# Patient Record
Sex: MALE
[De-identification: names, ages, dates, MRNs, and addresses within clinical notes are randomized per-mention and may not be internally consistent; named-entity substitution may affect disease eponyms.]

---

## 2018-08-16 ENCOUNTER — HOSPITAL ENCOUNTER (OUTPATIENT)
Dept: HOSPITAL 88 - RAD | Age: 63
End: 2018-08-16
Attending: FAMILY MEDICINE
Payer: MEDICARE

## 2018-08-16 DIAGNOSIS — L89.610: Primary | ICD-10-CM

## 2018-08-16 NOTE — DIAGNOSTIC IMAGING REPORT
Exam: Right foot series, 3 views. 



Clinical History: Pressure ulcer in right heel



Comparison: None.



Findings: 3 views of the right foot. There is normal bone mineralization.

Negative for acute, displaced fracture or dislocation. No cortical erosion or

destruction.

Degenerative changes in the hindfoot/midfoot joints.

Extensive vascular calcifications.

Soft tissue defect in the posterior aspect of the heel, likely representing the

known ulceration.

Moderate anterior calcaneal enthesophyte.



Impression:



1. Soft tissue defect in the posterior aspect of the heel likely represents the

known ulceration. Adjacent bony structures are intact. No cortical destruction

or erosion to suggest osteomyelitis.

2. Extensive vascular calcifications.





Signed by: Dr. Jeremiah Brito M.D. on 8/16/2018 2:15 PM

## 2018-08-23 ENCOUNTER — HOSPITAL ENCOUNTER (OUTPATIENT)
Dept: HOSPITAL 88 - WCC | Age: 63
LOS: 8 days | End: 2018-08-31
Attending: FAMILY MEDICINE
Payer: MEDICARE

## 2018-08-23 DIAGNOSIS — H54.8: ICD-10-CM

## 2018-08-23 DIAGNOSIS — I79.8: ICD-10-CM

## 2018-08-23 DIAGNOSIS — I10: ICD-10-CM

## 2018-08-23 DIAGNOSIS — E11.65: Primary | ICD-10-CM

## 2018-08-23 DIAGNOSIS — N18.6: ICD-10-CM

## 2018-08-23 DIAGNOSIS — E11.40: ICD-10-CM

## 2018-08-23 DIAGNOSIS — L89.610: ICD-10-CM

## 2018-08-23 DIAGNOSIS — L97.528: ICD-10-CM

## 2018-08-23 DIAGNOSIS — E78.00: ICD-10-CM

## 2018-08-23 DIAGNOSIS — E11.621: ICD-10-CM

## 2018-08-23 PROCEDURE — 36415 COLL VENOUS BLD VENIPUNCTURE: CPT

## 2018-08-23 PROCEDURE — 82948 REAGENT STRIP/BLOOD GLUCOSE: CPT

## 2019-01-03 ENCOUNTER — HOSPITAL ENCOUNTER (INPATIENT)
Dept: HOSPITAL 88 - ER | Age: 64
LOS: 10 days | Discharge: HOME | DRG: 255 | End: 2019-01-13
Attending: INTERNAL MEDICINE | Admitting: INTERNAL MEDICINE
Payer: MEDICARE

## 2019-01-03 VITALS — BODY MASS INDEX: 33.56 KG/M2 | HEIGHT: 70 IN | WEIGHT: 234.44 LBS

## 2019-01-03 VITALS — DIASTOLIC BLOOD PRESSURE: 74 MMHG | SYSTOLIC BLOOD PRESSURE: 174 MMHG

## 2019-01-03 VITALS — SYSTOLIC BLOOD PRESSURE: 149 MMHG | DIASTOLIC BLOOD PRESSURE: 65 MMHG

## 2019-01-03 DIAGNOSIS — E11.621: ICD-10-CM

## 2019-01-03 DIAGNOSIS — Z99.2: ICD-10-CM

## 2019-01-03 DIAGNOSIS — E11.52: Primary | ICD-10-CM

## 2019-01-03 DIAGNOSIS — L89.613: ICD-10-CM

## 2019-01-03 DIAGNOSIS — L97.524: ICD-10-CM

## 2019-01-03 DIAGNOSIS — N18.6: ICD-10-CM

## 2019-01-03 DIAGNOSIS — E66.01: ICD-10-CM

## 2019-01-03 DIAGNOSIS — R53.81: ICD-10-CM

## 2019-01-03 DIAGNOSIS — I50.9: ICD-10-CM

## 2019-01-03 DIAGNOSIS — Z79.82: ICD-10-CM

## 2019-01-03 DIAGNOSIS — E11.40: ICD-10-CM

## 2019-01-03 DIAGNOSIS — M86.172: ICD-10-CM

## 2019-01-03 DIAGNOSIS — Z95.5: ICD-10-CM

## 2019-01-03 DIAGNOSIS — L03.116: ICD-10-CM

## 2019-01-03 DIAGNOSIS — E11.22: ICD-10-CM

## 2019-01-03 DIAGNOSIS — E78.5: ICD-10-CM

## 2019-01-03 DIAGNOSIS — H35.30: ICD-10-CM

## 2019-01-03 DIAGNOSIS — Z91.19: ICD-10-CM

## 2019-01-03 DIAGNOSIS — I13.2: ICD-10-CM

## 2019-01-03 DIAGNOSIS — E11.319: ICD-10-CM

## 2019-01-03 DIAGNOSIS — E11.69: ICD-10-CM

## 2019-01-03 DIAGNOSIS — G89.29: ICD-10-CM

## 2019-01-03 DIAGNOSIS — I25.10: ICD-10-CM

## 2019-01-03 DIAGNOSIS — Z79.4: ICD-10-CM

## 2019-01-03 DIAGNOSIS — Z95.820: ICD-10-CM

## 2019-01-03 LAB
ALBUMIN SERPL-MCNC: 3.4 G/DL (ref 3.5–5)
ALBUMIN/GLOB SERPL: 0.8 {RATIO} (ref 0.8–2)
ALP SERPL-CCNC: 61 IU/L (ref 40–150)
ALT SERPL-CCNC: 12 IU/L (ref 0–55)
ANION GAP SERPL CALC-SCNC: 18.5 MMOL/L (ref 8–16)
BASOPHILS # BLD AUTO: 0.1 10*3/UL (ref 0–0.1)
BASOPHILS NFR BLD AUTO: 0.8 % (ref 0–1)
BUN SERPL-MCNC: 33 MG/DL (ref 7–26)
BUN/CREAT SERPL: 4 (ref 6–25)
CALCIUM SERPL-MCNC: 9.7 MG/DL (ref 8.4–10.2)
CHLORIDE SERPL-SCNC: 91 MMOL/L (ref 98–107)
CK MB SERPL-MCNC: 1.1 NG/ML (ref 0–5)
CK SERPL-CCNC: 85 IU/L (ref 30–200)
CO2 SERPL-SCNC: 29 MMOL/L (ref 22–29)
DEPRECATED APTT PLAS QN: 32.9 SECONDS (ref 23.8–35.5)
DEPRECATED INR PLAS: 0.94
DEPRECATED NEUTROPHILS # BLD AUTO: 6.2 10*3/UL (ref 2.1–6.9)
EGFRCR SERPLBLD CKD-EPI 2021: 8 ML/MIN (ref 60–?)
EOSINOPHIL # BLD AUTO: 0.4 10*3/UL (ref 0–0.4)
EOSINOPHIL NFR BLD AUTO: 4.4 % (ref 0–6)
ERYTHROCYTE [DISTWIDTH] IN CORD BLOOD: 14.8 % (ref 11.7–14.4)
GLOBULIN PLAS-MCNC: 4.4 G/DL (ref 2.3–3.5)
GLUCOSE SERPLBLD-MCNC: 142 MG/DL (ref 74–118)
HCT VFR BLD AUTO: 37.6 % (ref 38.2–49.6)
HGB BLD-MCNC: 11.9 G/DL (ref 14–18)
LIPASE SERPL-CCNC: 32 U/L (ref 8–78)
LYMPHOCYTES # BLD: 1.7 10*3/UL (ref 1–3.2)
LYMPHOCYTES NFR BLD AUTO: 18.8 % (ref 18–39.1)
MCH RBC QN AUTO: 30.7 PG (ref 28–32)
MCHC RBC AUTO-ENTMCNC: 31.6 G/DL (ref 31–35)
MCV RBC AUTO: 96.9 FL (ref 81–99)
MONOCYTES # BLD AUTO: 0.7 10*3/UL (ref 0.2–0.8)
MONOCYTES NFR BLD AUTO: 8.1 % (ref 4.4–11.3)
NEUTS SEG NFR BLD AUTO: 67.4 % (ref 38.7–80)
PLATELET # BLD AUTO: 347 X10E3/UL (ref 140–360)
POTASSIUM SERPL-SCNC: 4.5 MMOL/L (ref 3.5–5.1)
PROTHROMBIN TIME: 13.4 SECONDS (ref 11.9–14.5)
RBC # BLD AUTO: 3.88 X10E6/UL (ref 4.3–5.7)
SODIUM SERPL-SCNC: 134 MMOL/L (ref 136–145)

## 2019-01-03 PROCEDURE — 83690 ASSAY OF LIPASE: CPT

## 2019-01-03 PROCEDURE — 87493 C DIFF AMPLIFIED PROBE: CPT

## 2019-01-03 PROCEDURE — 85730 THROMBOPLASTIN TIME PARTIAL: CPT

## 2019-01-03 PROCEDURE — 87205 SMEAR GRAM STAIN: CPT

## 2019-01-03 PROCEDURE — 86705 HEP B CORE ANTIBODY IGM: CPT

## 2019-01-03 PROCEDURE — 82948 REAGENT STRIP/BLOOD GLUCOSE: CPT

## 2019-01-03 PROCEDURE — 87075 CULTR BACTERIA EXCEPT BLOOD: CPT

## 2019-01-03 PROCEDURE — 87186 SC STD MICRODIL/AGAR DIL: CPT

## 2019-01-03 PROCEDURE — 87340 HEPATITIS B SURFACE AG IA: CPT

## 2019-01-03 PROCEDURE — 88304 TISSUE EXAM BY PATHOLOGIST: CPT

## 2019-01-03 PROCEDURE — 84484 ASSAY OF TROPONIN QUANT: CPT

## 2019-01-03 PROCEDURE — 87040 BLOOD CULTURE FOR BACTERIA: CPT

## 2019-01-03 PROCEDURE — 36415 COLL VENOUS BLD VENIPUNCTURE: CPT

## 2019-01-03 PROCEDURE — 99284 EMERGENCY DEPT VISIT MOD MDM: CPT

## 2019-01-03 PROCEDURE — 80048 BASIC METABOLIC PNL TOTAL CA: CPT

## 2019-01-03 PROCEDURE — 86704 HEP B CORE ANTIBODY TOTAL: CPT

## 2019-01-03 PROCEDURE — 93005 ELECTROCARDIOGRAM TRACING: CPT

## 2019-01-03 PROCEDURE — 88305 TISSUE EXAM BY PATHOLOGIST: CPT

## 2019-01-03 PROCEDURE — 83605 ASSAY OF LACTIC ACID: CPT

## 2019-01-03 PROCEDURE — 80053 COMPREHEN METABOLIC PANEL: CPT

## 2019-01-03 PROCEDURE — 82550 ASSAY OF CK (CPK): CPT

## 2019-01-03 PROCEDURE — 88311 DECALCIFY TISSUE: CPT

## 2019-01-03 PROCEDURE — 90962 ESRD SERV 1 VISIT P MO 20+: CPT

## 2019-01-03 PROCEDURE — 85025 COMPLETE CBC W/AUTO DIFF WBC: CPT

## 2019-01-03 PROCEDURE — 85610 PROTHROMBIN TIME: CPT

## 2019-01-03 PROCEDURE — 86706 HEP B SURFACE ANTIBODY: CPT

## 2019-01-03 PROCEDURE — 82553 CREATINE MB FRACTION: CPT

## 2019-01-03 PROCEDURE — 87071 CULTURE AEROBIC QUANT OTHER: CPT

## 2019-01-03 RX ADMIN — INSULIN LISPRO SCH UNIT: 100 INJECTION, SOLUTION INTRAVENOUS; SUBCUTANEOUS at 17:18

## 2019-01-03 RX ADMIN — INSULIN LISPRO SCH UNIT: 100 INJECTION, SOLUTION INTRAVENOUS; SUBCUTANEOUS at 20:21

## 2019-01-03 RX ADMIN — TAZOBACTAM SODIUM AND PIPERACILLIN SODIUM SCH MLS/HR: 250; 2 INJECTION, SOLUTION INTRAVENOUS at 14:43

## 2019-01-03 NOTE — XMS REPORT
Clinical Summary

                             Created on: 2019



Alexis Alvarez

External Reference #: [id number]

: 1955

Sex: Male



Demographics







                          Address                   [address]  [address]

 

                          Home Phone                [phone]

 

                          Preferred Language        Unknown

 

                          Marital Status            Unknown

 

                          Adventism Affiliation     Ad[employer]

 

                          Race                      White

 

                          Ethnic Group              /Latin





Author







                          Author                    [facility]

 

                          Address                   Unknown

 

                          Phone                     Unavailable







Support







                Name            Relationship    Address         Phone

 

                    Zabrina Alvarez    ECON                [address] [place]  [address]                     [phone]

 

                    Bishop Alvarez       ECON                [address] [place]  [address]                     [phone]







Care Team Providers







                    Care Team Member Name    Role                Phone

 

                    Chauncey Villagomez    PCP                 [phone]







Allergies







                                        Comments



                 Active Allergy     Reactions       Severity        Noted Date 

 

                                        



bandaids



                 Adhesive        Other (See      High            2013 



                                         Comments)   







Medications







                          End Date                  Status



              Medication     Sig          Dispensed     Refills      Start  



                                         Date  

 

                                                    Active



                     insulin glargine (LANTUS)     Inject 12           0   



                           100 unit/mL injection     Units     



                                         subcutaneousl     



                                         y nightly Pt     



                                         states that     



                                         he uses 12-20     



                                         units based     



                                         upon his BS     



                                         and diet.     

 

                                                    Active



                     sevelamer (RENVELA) 800     Take 3,200 mg       0   



                           mg tablet                 by mouth 3     



                                         (three) times     



                                         daily with     



                                         meals Taking     



                                         3 with each     



                                         meal and 1     



                                         with snack.     

 

                                                    Active



                     nateglinide (STARLIX) 120     Take 120 mg         0   



                           MG tablet                 by mouth 3     



                                         (three) times     



                                         daily before     



                                         meals.     

 

                                                    Active



                     gabapentin (NEURONTIN)     Take 300 mg         0   



                           300 MG capsule            by mouth     



                                         daily .     

 

                                                    Active



                     aspirin 81 MG EC tablet     Take 81 mg by       0   



                                         mouth daily.     

 

                                                    Active



                     OMEGA-3/DHA/EPA/FISH OIL     Take 1 g by         0   



                           (FISH OIL-OMEGA-3 FATTY     mouth 2 (two)     



                           ACIDS) 300-1,000 mg       times daily .     



                                         capsule      

 

                                                    Active



                     valsartan (DIOVAN) 80 MG     Take 160 mg         0   



                           tablet                    by mouth     



                                         daily as     



                                         needed (Only     



                                         takes about     



                                         once a week     



                                         if/when the     



                                         BP is greater     



                                         than 150     



                                         systolic) .     

 

                                                    Active



                     insulin aspart (NOVOLOG)     Inject 10           0   



                           100 unit/mL InPn          Units     



                                         subcutaneousl     



                                         y 2 (two)     



                                         times daily     



                                         Pt reports     



                                         that he only     



                                         takes from     



                                         8-15 units     



                                         based upon     



                                         the BS.     

 

                                                    Active



                     cholecalciferol (VITAMIN     Take 1,000          0   



                           D3) 1,000 unit tablet     Units by     



                                         mouth daily.     

 

                                                    Active



                     pantoprazole (PROTONIX)     Take 40 mg by       0   



                           40 MG tablet              mouth daily.     

 

                                                    Active



                     pentoxifylline (TRENTAL)     Take 400 mg         0   



                           400 mg CR tablet          by mouth 3     



                                         (three) times     



                                         daily with     



                                         meals.     

 

                                                    Active



                     fenofibrate (TRICOR) 48     Take 48 mg by       0   



                           MG tablet                 mouth daily.     

 

                          2018                Discontinued



                     ciprofloxacin HCl (CIPRO)     Take 500 mg         0   



                           500 MG tablet             by mouth 2     



                                         (two) times     



                                         daily.     

 

                          2018                Discontinued



                     clopidogrel (PLAVIX) 300     Take 75 mg by       0   



                           mg Tab                    mouth once.     







Active Problems







 



                           Problem                   Noted Date

 

 



                           Pre-transplant evaluation for chronic kidney disease     2017

 

 



                                         Last Assessment & Plan:



                                         He is an acceptable candidate for kidney transplant. Kidney will have to be



                                         placed on the right secondary to his extensive vascular disease on the



                                         left. The left great big toe is healed but still appears to be a chronic



                                         callus.

 

 



                           Peripheral artery disease     10/17/2016

 

 



                                         Last Assessment & Plan:



                                         Continue outpatient with podiatry for LASHAWN.

 

 



                           Ischemic ulcer diabetic foot     10/17/2016

 

 



                                         Overview:



                                         Left great toe neuro-ischemic ulcer

 

 



                           Obese                     2016

 

 



                                         Overview:



                                         Needs to reduce weight to target weight of 240 with improved diet and



                                         exercise.





                                         L



                                         ast Assessment & Plan:



                                         He was encouraged to monitor his intake and to exercise as tolerated.

 

 



                           CAD (coronary artery disease)     12/10/2015

 

 



                                         Last Assessment & Plan:



                                         Patient needs a CTA to evaluate for runoffs once he achieves his weight



                                         goal. He will also be at increased risk of bleeding because he is on



                                         Plavix.

 

 



                                         ESRD (end stage renal disease) 

 

 



                                         Last Assessment & Plan:



                                         ESRD secondary to DM/HTN. He has required hemodialysis for 6 years and does



                                         not produce urine.

 

 



                                         DM (diabetes mellitus) 

 

 



                                         Last Assessment & Plan:



                                         Diabetes management per primary care physician.

 

 



                                         Retinopathy 

 

 



                                         HTN (hypertension) 

 

 



                                         Last Assessment & Plan:



                                         Continue current management.

 

 



                                         PAD (peripheral artery disease) 

 

 



                                         Retinopathy 







Encounters







                          Care Team                 Description



                     Date                Type                Specialty  

 

                                        



Raysa Luz RN                        Waitlist Maintenance



                     10/22/2018          Telephone           Transplant  

 

                                        



Raysa Luz RN                        Waitlist Maintenance



                     08/15/2018          Telephone           Transplant  

 

                                        



Raysa Luz RN                        Waitlist Maintenance



                     2018          Telephone           Transplant  

 

                                        



Raysa Luz RN                         



                     2018          Documentation       Transplant  

 

                                        



ORadha Mahoney MD             ESRD (end stage renal disease) (HCC); 

Patient awaiting renal transplant; 

Type 2 diabetes mellitus with other kidney complication, unspecified long term 
insulin use status (HCC)



                     2018          Orders Only         Lab  

 

                                        



Candy Jeffries MD              



                           2018                Orders Only   

 

                                        



Radha Alberto MD             ESRD (end stage renal disease) (HCC); 

Patient awaiting renal transplant; 

Type 2 diabetes mellitus with other kidney complication, unspecified long term 
insulin use status (HCC)



                     2018          Orders Only         Transplant Hepatology  

 

                                        



Asha Garcia MD Etheridge, Whitson B., MD               ESRD (end stage renal disease) (HCC) (Primary Dx)



                     2018          Evaluation          Transplant  

 

                                        



Asha Garcia MD               Encounter for preprocedural cardiovascular examination

 ; 

ESRD (end stage renal disease) (HCC); 

Patient awaiting renal transplant; 

Type 2 diabetes mellitus with other kidney complication, unspecified long term 
insulin use status (HCC)



                     2018          Hospital            Radiology  



                                         Encounter   

 

                                        



Radha Alberto MD             ESRD (end stage renal disease) (HCC); 

Patient awaiting renal transplant; 

Type 2 diabetes mellitus with other kidney complication, unspecified long term 
insulin use status (HCC)



                     02/15/2018          Orders Only         Lab  

 

                                                    ESRD (end stage renal disease) (HCC) (Primary Dx); 

Patient awaiting renal transplant; 

Type 2 diabetes mellitus with other kidney complication, unspecified long term 
insulin use status (HCC)



                     2018          Orders Only         Lab  

 

                                        



Kayleigh, Mariella                          



                     2018          Documentation       Transplant  

 

                                        



Raysa Luz RN                         



                     01/15/2018          Orders Only         Transplant  

 

                                        



Kayleigh, Mariella                          



                     01/10/2018          Documentation       Transplant  

 

                                        



Raysa Luz, RN                        ESRD (end stage renal disease) (HCC) (Primary Dx); 

Patient awaiting renal transplant; 

Type 2 diabetes mellitus with other kidney complication, unspecified long term 
insulin use status (HCC); 

Encounter for preprocedural cardiovascular examination 



                     2018          Orders Only         Transplant  

 

                                        



Jason Avery MD                



                     2018          Lab Requisition     Lab  

 

                                        



Jason Avery MD                



                     2018          Lab Requisition     Lab  



after 2018



Social History







                                        Date



                 Tobacco Use     Types           Packs/Day       Years Used 

 

                                        Quit: 10/04/2003



                                         Former Smoker    

 

    



                                         Smokeless Tobacco: Never   



                                         Used   









   



                 Alcohol Use     Drinks/Week     oz/Week         Comments

 

   



                                         No   









 



                           Sex Assigned at Birth     Date Recorded

 

 



                                         Not on file 









                                        Industry



                           Job Start Date            Occupation 

 

                                        Not on file



                           Not on file               Not on file 









                                        Travel End



                           Travel History            Travel Start 

 





                                         No recent travel history available.







Last Filed Vital Signs







                                        Time Taken



                           Vital Sign                Reading 

 

                                        2018  2:59 PM CST



                           Blood Pressure            159/75 

 

                                        2018  2:59 PM CST



                           Pulse                     76 

 

                                        2018  2:59 PM CST



                           Temperature               36.7 C (98.1 F) 

 

                                        2018  2:59 PM CST



                           Respiratory Rate          18 

 

                                        -



                           Oxygen Saturation         - 

 

                                        -



                           Inhaled Oxygen            - 



                                         Concentration  

 

                                        2018  2:59 PM CST



                           Weight                    110.9 kg (244 lb 9.6 oz) 

 

                                        2018  2:59 PM CST



                           Height                    177.2 cm (5' 9.75") 

 

                                        2018  2:59 PM CST



                           Body Mass Index           35.35 







Plan of Treatment







   



                 Health Maintenance     Due Date        Last Done       Comments

 

   



                           INFLUENZA VACCINE         10/01/2018  







Implants







                    Device Identifier    Shelf Expiration Date    Model / Serial / Lot



                 Implanted       Type            Area            Manufactur   



                                         er   

 

                                        2018          62883-52 /

 /

                                        [id number]



                     Closure Sys Perclose Progl 6fr     Cardiovasc          [employer]   



                     09564-60 - [id number]     chava                LAB:VASC   



                           Implanted: Qty: 1 on 2016 by       Deejay Armstrong MD      

 

                                        2018          [id number] /

 /

                                        [id number]



                 Device Clsr Angio-Seal Vip 6fr     Cardiovasc      N/A: Groin      [facility]   



                     [id number] - [id number]     ular                MED:CARDIA   



                           Implanted: Qty: 1 on 2017 by       Deejay Alvarez MD      

 

                                        2018          [id number] - 12 /

 /

                                        [id number] 



                           Xience Alpine Otw         [employer]   



                           Implanted: Qty: 1 on 12/10/2015       VASCULAR   



                                         DEVICE   







Procedures







                                        Comments



                 Procedure Name     Priority        Date/Time       Associated Diagnosis 

 

                                        



Results for this procedure are in the results section.



                 VIRTUAL CROSSMATCH     Routine         2018      ESRD (end stage renal 



                           1:13 PM CDT               disease) (HCC) 



                                         Patient awaiting renal 



                                         transplant 



                                         Type 2 diabetes mellitus 



                                         with other kidney 



                                         complication, unspecified 



                                         long term insulin use 



                                         status 

 

                                        



Results for this procedure are in the results section.



                 CRSMTCH FLOW 3 SERUM     Routine         2018      ESRD (end stage renal 



                           1:13 PM CDT               disease) (HCC) 



                                         Patient awaiting renal 



                                         transplant 



                                         Type 2 diabetes mellitus 



                                         with other kidney 



                                         complication, unspecified 



                                         long term insulin use 



                                         status 

 

                                        



Results for this procedure are in the results section.



                 CRSMTCH SEROLOGY FV 3     Routine         2018      ESRD (end stage renal 



                           1:13 PM CDT               disease) (HCC) 



                                         Patient awaiting renal 



                                         transplant 



                                         Type 2 diabetes mellitus 



                                         with other kidney 



                                         complication, unspecified 



                                         long term insulin use 



                                         status 

 

                                        



Results for this procedure are in the results section.



                 AB SPECIFICITY CLASS I     Routine         2018      ESRD (end stage renal 



                           7:36 AM CDT               disease) (HCC) 



                                         Patient awaiting renal 



                                         transplant 



                                         Type 2 diabetes mellitus 



                                         with other kidney 



                                         complication, unspecified 



                                         long term insulin use 



                                         status 

 

                                        



Results for this procedure are in the results section.



                 FLOW PRA CLASS II WITH     Routine         2018      ESRD (end stage renal 



                     REFLEX TO ANTIBODY      7:36 AM CDT         disease) (HCC) 



                           SPECIFICITY               Patient awaiting renal 



                                         transplant 



                                         Type 2 diabetes mellitus 



                                         with other kidney 



                                         complication, unspecified 



                                         long term insulin use 



                                         status 

 

                                        



Results for this procedure are in the results section.



                 FLOW PRA CLASS I WITH     Routine         2018      ESRD (end stage renal 



                     REFLEX TO ANTIBODY      7:36 AM CDT         disease) (HCC) 



                           SPECIFICITY               Patient awaiting renal 



                                         transplant 



                                         Type 2 diabetes mellitus 



                                         with other kidney 



                                         complication, unspecified 



                                         long term insulin use 



                                         status 

 

                                        



Results for this procedure are in the results section.



                 AB SPECIFICITY CLASS I     Routine         2018      ESRD (end stage renal 



                           2:33 PM CST               disease) (HCC) 



                                         Patient awaiting renal 



                                         transplant 



                                         Type 2 diabetes mellitus 



                                         with other kidney 



                                         complication, unspecified 



                                         long term insulin use 



                                         status (HCC) 

 

                                        



Results for this procedure are in the results section.



                 PSA             Routine         2018      ESRD (end stage renal 



                           2:33 PM CST               disease) (HCC) 



                                         Patient awaiting renal 



                                         transplant 



                                         Type 2 diabetes mellitus 



                                         with other kidney 



                                         complication, unspecified 



                                         long term insulin use 



                                         status (HCC) 

 

                                        



Results for this procedure are in the results section.



                 FLOW PRA CLASS II WITH     Routine         2018      ESRD (end stage renal 



                     REFLEX TO ANTIBODY      2:33 PM CST         disease) (HCC) 



                           SPECIFICITY               Patient awaiting renal 



                                         transplant 



                                         Type 2 diabetes mellitus 



                                         with other kidney 



                                         complication, unspecified 



                                         long term insulin use 



                                         status (HCC) 

 

                                        



Results for this procedure are in the results section.



                 FLOW PRA CLASS I WITH     Routine         2018      ESRD (end stage renal 



                     REFLEX TO ANTIBODY      2:33 PM CST         disease) (HCC) 



                           SPECIFICITY               Patient awaiting renal 



                                         transplant 



                                         Type 2 diabetes mellitus 



                                         with other kidney 



                                         complication, unspecified 



                                         long term insulin use 



                                         status (HCC) 

 

                                        



Results for this procedure are in the results section.



                 NM CARDIAC PET PERFUSION     Routine         2018      Encounter for 



                     REST AND/OR STRESS      2:12 PM CST         preprocedural 



                                         cardiovascular 



                                         examination 



                                         ESRD (end stage renal 



                                         disease) (HCC) 



                                         Patient awaiting renal 



                                         transplant 



                                         Type 2 diabetes mellitus 



                                         with other kidney 



                                         complication, unspecified 



                                         long term insulin use 



                                         status (HCC) 

 

                                        



Results for this procedure are in the results section.



                     TREADMILL           Routine             2018  



                           TOLERANCE(NON-NUCLEAR      1:55 PM CST  



                                         TREADMILL)    

 

                                        



Results for this procedure are in the results section.



                 FLOW PRA CLASS II WITH     Routine         02/15/2018      ESRD (end stage renal 



                     REFLEX TO ANTIBODY      10:23 AM CST        disease) (HCC) 



                           SPECIFICITY               Patient awaiting renal 



                                         transplant 



                                         Type 2 diabetes mellitus 



                                         with other kidney 



                                         complication, unspecified 



                                         long term insulin use 



                                         status (HCC) 

 

                                        



Results for this procedure are in the results section.



                 FLOW PRA CLASS I WITH     Routine         02/15/2018      ESRD (end stage renal 



                     REFLEX TO ANTIBODY      10:23 AM CST        disease) (HCC) 



                           SPECIFICITY               Patient awaiting renal 



                                         transplant 



                                         Type 2 diabetes mellitus 



                                         with other kidney 



                                         complication, unspecified 



                                         long term insulin use 



                                         status (HCC) 

 

                                        



Results for this procedure are in the results section.



                 CRSMTCH FLOW 3 SERUM     Routine         02/15/2018      ESRD (end stage renal 



                           10:23 AM CST              disease) (HCC) 



                                         Patient awaiting renal 



                                         transplant 



                                         Type 2 diabetes mellitus 



                                         with other kidney 



                                         complication, unspecified 



                                         long term insulin use 



                                         status (HCC) 

 

                                        



Results for this procedure are in the results section.



                 CRSMTCH SEROLOGY FV 3     Routine         02/15/2018      ESRD (end stage renal 



                           10:23 AM CST              disease) (HCC) 



                                         Patient awaiting renal 



                                         transplant 



                                         Type 2 diabetes mellitus 



                                         with other kidney 



                                         complication, unspecified 



                                         long term insulin use 



                                         status (HCC) 

 

                                        



Results for this procedure are in the results section.



                 AB SPECIFICITY CLASS I     Routine         02/15/2018      ESRD (end stage renal 



                           8:38 AM CST               disease) (HCC) 



                                         Patient awaiting renal 



                                         transplant 



                                         Type 2 diabetes mellitus 



                                         with other kidney 



                                         complication, unspecified 



                                         long term insulin use 



                                         status (HCC) 

 

                                        



Results for this procedure are in the results section.



                 CRSMTCH FLOW 3 SERUM     Routine         2018      ESRD (end stage renal 



                           5:45 AM CST               disease) (HCC) 



                                         Patient awaiting renal 



                                         transplant 



                                         Type 2 diabetes mellitus 



                                         with other kidney 



                                         complication, unspecified 



                                         long term insulin use 



                                         status (HCC) 

 

                                        



Results for this procedure are in the results section.



                 CRSMTCH SEROLOGY FV 3     Routine         2018      ESRD (end stage renal 



                           5:45 AM CST               disease) (HCC) 



                                         Patient awaiting renal 



                                         transplant 



                                         Type 2 diabetes mellitus 



                                         with other kidney 



                                         complication, unspecified 



                                         long term insulin use 



                                         status (HCC) 

 

                                        



Results for this procedure are in the results section.



                 CRSMTCH FLOW 3 SERUM     Routine         2018      ESRD (end stage renal 



                           12:09 PM CST              disease) (HCC) 



                                         Patient awaiting renal 



                                         transplant 



                                         Type 2 diabetes mellitus 



                                         with other kidney 



                                         complication, unspecified 



                                         long term insulin use 



                                         status (HCC) 

 

                                        



Results for this procedure are in the results section.



                 CRSMTCH SEROLOGY FV 3     Routine         2018      ESRD (end stage renal 



                           12:09 PM CST              disease) (HCC) 



                                         Patient awaiting renal 



                                         transplant 



                                         Type 2 diabetes mellitus 



                                         with other kidney 



                                         complication, unspecified 



                                         long term insulin use 



                                         status (HCC) 

 

                                        



Results for this procedure are in the results section.



                 FLOW PRA CLASS II WITH     Routine         2018      ESRD (end stage renal 



                     REFLEX TO ANTIBODY      12:09 PM CST        disease) (HCC) 



                           SPECIFICITY               Patient awaiting renal 



                                         transplant 



                                         Type 2 diabetes mellitus 



                                         with other kidney 



                                         complication, unspecified 



                                         long term insulin use 



                                         status (HCC) 

 

                                        



Results for this procedure are in the results section.



                 FLOW PRA CLASS I WITH     Routine         2018      ESRD (end stage renal 



                     REFLEX TO ANTIBODY      12:09 PM CST        disease) (HCC) 



                           SPECIFICITY               Patient awaiting renal 



                                         transplant 



                                         Type 2 diabetes mellitus 



                                         with other kidney 



                                         complication, unspecified 



                                         long term insulin use 



                                         status (HCC) 

 

                                        



Results for this procedure are in the results section.



                 CRSMTCH FLOW 3 SERUM     Routine         2018      ESRD (end stage renal 



                           12:07 PM CST              disease) (HCC) 



                                         Patient awaiting renal 



                                         transplant 



                                         Type 2 diabetes mellitus 



                                         with other kidney 



                                         complication, unspecified 



                                         long term insulin use 



                                         status (HCC) 

 

                                        



Results for this procedure are in the results section.



                 CRSMTCH SEROLOGY FV 3     Routine         2018      ESRD (end stage renal 



                           12:07 PM CST              disease) (HCC) 



                                         Patient awaiting renal 



                                         transplant 



                                         Type 2 diabetes mellitus 



                                         with other kidney 



                                         complication, unspecified 



                                         long term insulin use 



                                         status (HCC) 

 

                                        



Results for this procedure are in the results section.



                 AB SPECIFICITY CLASS I     Routine         2018      ESRD (end stage renal 



                           6:19 AM CST               disease) (HCC) 



                                         Patient awaiting renal 



                                         transplant 



                                         Type 2 diabetes mellitus 



                                         with other kidney 



                                         complication, unspecified 



                                         long term insulin use 



                                         status (HCC) 

 

                                        



Results for this procedure are in the results section.



                     CRSMTCH FLOW 3 SERUM     Routine             2018  



                                         12:00 PM CST  

 

                                        



Results for this procedure are in the results section.



                     CRSMTCH SEROLOGY FV 3     Routine             2018  



                                         12:00 PM CST  



after 2018



Results

* CRSMTCH SEROLOGY FV 3 (2018  1:13 PM CDT)



Only the most recent of 6 results within the time period is included.





   



                 Component       Value           Ref Range       Performed At

 

   



                     CDC-XM FV1 AHG      AHG                 [facility]

 

   



                     CDC-XM FV1 AHG RES     Neg                 [facility]

 

   



                           CDC-XMFV1 AHG/DTT         [facility]

 

   



                           CDC-XMFV1 AHG/DTT RES       [facility]

 

   



                     CDC-XM FV2 AHG      AHG                 [facility]

 

   



                     CDC-XM FV2 AHG RES     Neg                 [facility]

 

   



                           CDC-XMFV2 AHG/DTT         [facility]

 

   



                           CDC-XMFV2 AHG/DTT RES       [facility]

 

   



                     CDC-XM FV3 AHG      AHG                 [facility]

 

   



                     CDC-XM FV3 AHG RES     Neg                 [facility]

 

   



                           CDC-XMFV3 AHG/DTT         [facility]

 

   



                           CDC-XMFV3 AHG/DTT RES       [facility]

 

   



                     Crsmtch Serology FV 3     DD [facility] XM: NEGATIVE      [facility]



                                         Comment   

 

   



                     UNOS ID             [id number]             [facility]

 

   



                     Donor Name          [id number], DD-LOCAL      [facility]













                                         Specimen

 





                                         Blood









   



                 Performing Organization     Address         City/State/Zipcode     Phone Number

 

   



                     [facility]     [address], [place]:  [id number],     [place] [address] 



                                         CLIA#[id number] CAP#[id number]  



                                         UNOS#[id number]  

 

   



                     [facility]     [address], [place]:  [id number]     [place] [address] 





* VIRTUAL CROSSMATCH (2018  1:13 PM CDT)





   



                 Component       Value           Ref Range       Performed At

 

   



                     [facility]            [place]      [facility]

 

   



                     Donor HLA Result     A1 A24; B62 B57; Cw9 Cw6; DR7      [facility]



                                         DR14; DR52; DQA1*01, 02; DQ9  



                                         DQ5; DPA1*01,01; DPB1*02:01,  



                                         DPB1*04:01  

 

   



                     VX DSA (MFI)       DSA and MFI         [facility]

 

   



                     DSA and MFI Result     NO DSA FOUND        [facility]

 

   



                     Virtual XM          Virtual XM Result      [facility]

 

   



                     VX Result          CURRENT NEGATIVE      [facility]

 

   



                           Virtual Crossmatch        [facility]



                                         Comment   

 

   



                     UNOS ID             [id number]             [facility]

 

   



                     Donor Name          [id number], DD-LOCAL      [facility]













                                         Specimen

 





                                         Blood









   



                 Performing Organization     Address         [facility]     Phone Number

 

   



                     [facility]     [address], [place]:  [id number],     [place] [address] 



                                         CLIA#[id number] CAP#[id number]  



                                         UNOS#[id number]  

 

   



                     [facility]     [address], [place]:  [id number]     [place] [address] 





* CRSMTCH FLOW 3 SERUM (2018  1:13 PM CDT)



Only the most recent of 6 results within the time period is included.





   



                 Component       Value           Ref Range       Performed At

 

   



                     T-FXM Serum1        T(IgG)              [facility]

 

   



                     T-FXM Serum1 Res     Neg                 [facility]

 

   



                     B-FXM Serum1        B(IgG)              [facility]

 

   



                     B-FXM Serum1 Res     Neg                 [facility]

 

   



                     T-FXM Serum2        T(IgG)              [facility]

 

   



                     T-FXM Serum2 Res     Neg                 [facility]

 

   



                     B-FXM Serum2        B(IgG)              [facility]

 

   



                     B-FXM Serum2 Res     Neg                 [facility]

 

   



                     T-FXM Serum3        T(IgG)              [facility]

 

   



                     T-FXM Serum3 Res     Neg                 [facility]

 

   



                     B-FXM Serum3        B(IgG)              [facility]

 

   



                     B-FXM Serum3 Res     Neg                 [facility]

 

   



                     Crsmtch Flow Comment     DD FLOW XM: NEGATIVE      [facility]

 

   



                     UNOS ID             [id number]             [facility]

 

   



                     Donor Name          [id number], DD-LOCAL      [facility]













                                         Specimen

 





                                         Blood









   



                 Performing Organization     Address         [facility]     Phone Number

 

   



                     [facility]     [address], [address]  [id number],     [place] [address] 



                                         CLIA#[id number] CAP#[id number]  



                                         UNOS#[id number]  

 

   



                     [facility]     [address], [place]:  [id number]     [place] [address] 





* FLOW PRA CLASS II WITH REFLEX TO ANTIBODY SPECIFICITY (2018  7:36 AM 
  CDT)



Only the most recent of 4 results within the time period is included.





   



                 Component       Value           Ref Range       Performed At

 

   



                     Flow Class II Percent     0                   [facility]



                                         Positive   

 

   



                           Flow Class Report         [facility]



                                         Comments   













                                         Specimen

 





                                         Blood









   



                 Performing Organization     Address         City[facility]     Phone Number

 

   



                     [facility]     [address], [place]:  [id number],     [place] [address] 



                                         CLIA#[id number] CAP#[id number]  



                                         UNOS#[id number]  

 

   



                     [facility]     [address]:  [id number]     [place] [address] 





* FLOW PRA CLASS I WITH REFLEX TO ANTIBODY SPECIFICITY (2018  7:36 AM CDT)



Only the most recent of 4 results within the time period is included.





   



                 Component       Value           Ref Range       Performed At

 

   



                     Flow Class I Percent     12                  [facility]



                                         Positive   

 

   



                           Flow Class Report         [facility]



                                         Comments   













                                         Specimen

 





                                         Blood









   



                 Performing Organization     Address         City/[facility]     Phone Number

 

   



                     [facility]     [address], [address]:  [id number],     [place] [address] 



                                         CLIA#[id number] CAP#[id number]  



                                         UNOS#[id number]  

 

   



                     [facility]     [address], [place]:  [id number]     [place] [address] 





* AB SPECIFICITY CLASS I (2018  7:36 AM CDT)



Only the most recent of 4 results within the time period is included.





   



                 Component       Value           Ref Range       Performed At

 

   



                     AB Specificity Class I     NO CLASS I ANTIBODY DETECTED      [facility]



                                         WITH MFIs > 4000  

 

   



                           AB Specificity Titr Class       [facility]



                                         Report   













                                         Specimen

 





                                         Blood









   



                 Performing Organization     Address         City/[facility]     Phone Number

 

   



                     [facility]     [address]  [id number],     [place] [address] 



                                         CLIA#[id number] CAP#[id number]  



                                         UNOS#[id number]  

 

   



                     [facility]     [id number]     [place] [address] 





* PSA (2018  2:33 PM CST)





   



                 Component       Value           Ref Range       Performed At

 

   



                 PSA             0.6             0.0 - 4.0 ng/mL     [facility]













                                         Specimen

 





                                         Blood









   



                 Performing Organization     Address         City[facility]     Phone Number

 

   



                 [facility]     [address] [place] [address]     [phone]





                                         MEDICAL CENTER   





* NM myocardial perfusion PET (rest and stress) (2018  2:12 PM CST)





 



                           Narrative                 Performed At

 

 



                           FINAL REPORT              [facility]



                                         PATIENT ID: [id number] 



                                         PROCEDURE: Rest/Stress MYOCARDIAL PERFUSION PET with 



                                         regadenoson\XA9\ 



                                         CPT CODE: 58666 



                                         INDICATION: Define extent, severity of known CAD, cardiovascular 



                                         evaluation prior to renal transplantation 



                                         HISTORY: Cardiac risk factors: ESRD, diabetes, hypertension, 



                                         obesity, peripheral vascular disease. Other cardiovascular history: 



                                         CAD with prior PCI. Recent cardiac symptoms: None. Current 



                                         cardiovascular-related medications: Aspirin, clopidogrel, losartan. 



                                         PROTOCOL: Limited low-dose CT imaging was performed for 



                                         attenuation correction. 40.1 mCi of Rb-82 chloride was injected iv at 



                                         rest, and gated PET (positron emission tomography) images were 



                                         obtained. Subsequently, 40.1 mCi of Rb-82 chloride was injected iv at 



                                         expected peak pharmacologic effect, and gated PET images were 



                                         obtained. 



                                         PRELIMINARY STRESS TEST DATA FROM NONINVASIVE CARDIOLOGY: 



                                         Pharmacologic stress was by 10-second iv infusion of 0.4 mg of 



                                         regadenoson. Radiotracer was injected 30 seconds after start of 



                                         stress. Heart rate was 64 beats/min at rest and 75 beats/min (47% of 



                                         MPHR) at tracer injection. BP was 132/88 mmHg at rest and 145/89 mmHg 



                                         at tracer injection. Stress was stopped for predetermined endpoint. 



                                         The patient experienced no symptoms; treatment was not required. 



                                         Preliminary ECG evaluation revealed sinus rhythm at rest and no 



                                         ischemic changes with stress. (Final ECG interpretation and other 



                                         stress and monitoring data are reported separately by Cardiology.) 



                                         IMAGING FINDINGS: Study quality is good. Images obtained after 



                                         rest and stress injections show normal LV activity. LV and RV volumes 



                                         appear normal. Gated images obtained at rest and with stress show 



                                         normal LV wall motion and thickening. LVEF at rest is 61%. LVEF at 



                                         stress is 67%. 



                                         IMPRESSION: 1. Normal study.2. Appropriate pharmacologic 



                                         stress.3. Normal myocardial perfusion.4. Normal resting LV 



                                         function. No deterioration of function is noted with pharmacologic 



                                         stress.5. Normal extracardiac tracer distribution.6. Compared to 



                                         previous [facility] study report on 10/13/2015, the previously mentioned 



                                         anterolateral defect is no longer seen. 



                                         NONINVASIVE RISK STRATIFICATION: 



                                         The above findings are considered low risk (<1% annual mortality 



                                         rate) based on the following criterion: 



                                         - Normal or small myocardial perfusion defect at rest or with stress 



                                         (JACC. 2012;59(9):857-81.) 



                                         Signed: Jameson Snell MD 



                                         Report Verified Date/Time:2018 16:23:27 



                                         Reading Location: [address] Reading Room 



                                          Electronically signed by: JAMESON SNELL M.D. on 2018 04:23 PM

 









                                        Procedure Note

 

                                        



Interface, External Ris In - 2018  4:25 PM CST



FINAL REPORT

 

PATIENT ID:   [id number]

 

PROCEDURE:     Rest/Stress MYOCARDIAL PERFUSION PET with 

regadenoson\XA9\

 

CPT CODE:     50251

 

INDICATION:     Define extent, severity of known CAD, cardiovascular 

evaluation prior to renal transplantation

 

HISTORY:     Cardiac risk factors: ESRD, diabetes, hypertension, 

obesity, peripheral vascular disease. Other cardiovascular history: 

CAD with prior PCI. Recent cardiac symptoms: None. Current 

cardiovascular-related medications: Aspirin, clopidogrel, losartan.

 

PROTOCOL:     Limited low-dose CT imaging was performed for 

attenuation correction. 40.1 mCi of Rb-82 chloride was injected iv at 

rest, and gated PET (positron emission tomography) images were 

obtained. Subsequently, 40.1 mCi of Rb-82 chloride was injected iv at 

expected peak pharmacologic effect, and gated PET images were 

obtained. 

 

PRELIMINARY STRESS TEST DATA FROM NONINVASIVE CARDIOLOGY:     

Pharmacologic stress was by 10-second iv infusion of 0.4 mg of 

regadenoson. Radiotracer was injected 30 seconds after start of 

stress. Heart rate was 64 beats/min at rest and 75 beats/min (47% of 

MPHR) at tracer injection. BP was 132/88 mmHg at rest and 145/89 mmHg 

at tracer injection. Stress was stopped for predetermined endpoint. 

The patient experienced no symptoms; treatment was not required. 

Preliminary ECG evaluation revealed sinus rhythm at rest and no 

ischemic changes with stress. (Final ECG interpretation and other 

stress and monitoring data are reported separately by Cardiology.) 

 

IMAGING FINDINGS:     Study quality is good. Images obtained after 

rest and stress injections show normal LV activity. LV and RV volumes 

appear normal. Gated images obtained at rest and with stress show 

normal LV wall motion and thickening. LVEF at rest is 61%. LVEF at 

stress is 67%.

 

IMPRESSION:     1. Normal study.  2. Appropriate pharmacologic 

stress.  3. Normal myocardial perfusion.  4. Normal resting LV 

function. No deterioration of function is noted with pharmacologic 

stress.  5. Normal extracardiac tracer distribution.  6. Compared to 

previous [facility] study report on 10/13/2015, the previously mentioned 

anterolateral defect is no longer seen.  

 

NONINVASIVE RISK STRATIFICATION: 

The above findings are considered low risk (<1% annual mortality 

rate) based on the following criterion:

                                        - Normal or small myocardial perfusion defect at rest or with stress

                                        (JACC. 2012;59(9):857-81.)

 

Signed: Jameson Snell MD

Report Verified Date/Time:  2018 16:23:27

 

Reading Location: [address] Reading Room

   Electronically signed by: JAMESON SNELL M.D. on 2018 04:23 PM

 









   



                 Performing Organization     Address         City/State/Zipcode     Phone Number

 

   



                                         [facility]   





* Treadmill tolerance(Non-Nuclear Treadmill) (2018  1:55 PM CST)





 



                           Narrative                 Performed At

 

 



                           Protocol Name Regodalisoson     [facility]



                                         Time In Exercise Phase 00:01:00 



                                         Max. Systolic  mmHg 



                                         Max Diastolic BP 89 mmHg 



                                         Max Heart Rate 75 BPM 



                                         Max Predicted Heart Rate 158 BPM 



                                         Reason For Termination Predetermined end point 



                                         Reason for Test Renal Transplant Work Up/Evaluation 



                                         Target HR Formula (220 - Age)*100% 



                                         Arrhythmias none 



                                         Resting ECG Normal sinus rhythm 



                                         ST Changes No Significant Changes 



                                         Overall Impression Indeterminate due to pharmacological stress 



                                         Chest Pain none 



                                         HR Response To Exercise 



                                         BP Response To Exercise 



                                         ASA 



                                         plavix 



                                         NOVOLOG 



                                         STARLIX 



                                         DIOVAN 



                                         TRENTAL 



                                         OMEGA-3 



                                         Confirmed by fellow Conner Busby ([id number]) on 2018 2:42:29 PM 



                                         Confirmed by MD MEKA, DEEJAY ([id number]) on 2018 3:34:20 PM 









                                        Procedure Note

 

                                        



Interface, External Ris In - 2018  3:34 PM CST



Protocol Name Regadenoson 

Time In Exercise Phase 00:01:00 

Max. Systolic  mmHg

Max Diastolic BP 89 mmHg

Max Heart Rate 75 BPM

Max Predicted Heart Rate 158 BPM

Reason For Termination Predetermined end point 

Reason for Test Renal Transplant Work Up/Evaluation 

Target HR Formula (220 - Age)*100% 

Arrhythmias none 

Resting ECG Normal sinus rhythm 

ST Changes No Significant Changes 

Overall Impression Indeterminate due to pharmacological stress 

Chest Pain none 

HR Response To Exercise  

BP Response To Exercise  



ASA

plavix

NOVOLOG

STARLIX

DIOVAN

TRENTAL

OMEGA-3



Confirmed by fellow Conner Busby ([id number]) on 2018 2:42:29 PM

Confirmed by MD MEKA, DEEJAY ([id number]) on 2018 3:34:20 PM









   



                 Performing Organization     Address         City/State/Zipcode     Phone Number

 

   



                                         [facility]   





after 2018



Insurance







     



            Payer      Benefit     Subscriber ID     Type       Phone      Address



                                         Plan /    



                                         Group    

 

     



                 MEDICARE        MEDICARE A      xxxxxxxxxx      Medicare  



                                         B    

 

     



                 MCR SUPPLEMENT/INDIVIDUAL     GENERIC         xxxxxxxxxx      [employer]  



                                         MEDICARE    



                                         SUPPLEMENT    









     



            Guarantor Name     Account     Relation to     Date of     Phone      Billing Address



                     Type                Patient             Birth  

 

     



            Alexis Alvarez     Personal/F     Self       1955     [phone] [address]               (Home)              [place] [address]







Advance Directives





For more information, please contact:



[facility]



[address] [place] [address] [phone]









                          Date Inactivated          Comments



                           Code Status               Date Activated  

 

                          2016  9:49 PM          



                           Full Code                 2016  7:48 AM  









  



                           This code status was determined by:     Patient 









                                                     

 

                          2015  2:43 PM        



                           Full Code                 12/10/2015  2:51 PM  









  



                           This code status was determined by:     Patient 









                                                     

 

                          12/10/2015  2:51 PM        



                           Full Code                 12/10/2015  7:28 AM  









  



                           This code status was determined by:     Patient

## 2019-01-03 NOTE — XMS REPORT
Patient Summary Document

                             Created on: 2019



JOVITA IRWIN

External Reference #: [id number]

: 1955

Sex: Male



Demographics







                          Address                   [address] [place] [address] [place]  [address]

 

                          Home Phone                [phone]

 

                          Preferred Language        Unknown

 

                          Marital Status            Unknown

 

                          [employer] Affiliation     Unknown

 

                          Race                      Unknown

 

                                        Additional Race(s)  

 

                          Ethnic Group              Unknown





Author







                          Author                    [facility]

 

                          Address                   Unknown

 

                          Phone                     Unavailable







Care Team Providers







                    Care Team Member Name    Role                Phone

 

                    KEISHA RENTERIA        Unavailable         Unavailable

 

                    RACHELL ERNST    Unavailable         Unavailable

 

                    LEE HOOD    Unavailable         Unavailable







Problems

This patient has no known problems.



Allergies, Adverse Reactions, Alerts

This patient has no known allergies or adverse reactions.



Medications

This patient has no known medications.



Results







           Test Description    Test Time    Test Comments    Text Results    Atomic Results    Result

 Comments

 

                FOOT RIGHT COMPLETE    2018 14:13:00                        [place] [address]      Patient Name: 
JOVITA IRWIN   MR #: [id number]    : 1955 Age/Sex: 63/M  Acct #: 
[id number] Req #: [id number]  Adm Physician:     Ordered by: AMANDA RENTERIA MD  
Report #: [id number]   Location: [facility]  Room/Bed:     
_________________________________________________________________________________
__________________    Procedure: [id number] DX/FOOT RIGHT COMPLETE  Exam Date: 
18                            Exam Time: 1319       REPORT STATUS: Signed 
     Exam: Right foot series, 3 views.       Clinical History: Pressure ulcer in
right heel      Comparison: None.      Findings: 3 views of the right foot. 
There is normal bone mineralization.   Negative for acute, displaced fracture or
dislocation. No cortical erosion or   destruction.   Degenerative changes in the
hindfoot/midfoot joints.   Extensive vascular calcifications.   Soft tissue 
defect in the posterior aspect of the heel, likely representing the   known 
ulceration.   Moderate anterior calcaneal enthesophyte.      Impression:      1.
Soft tissue defect in the posterior aspect of the heel likely represents the   
known ulceration. Adjacent bony structures are intact. No cortical destruction  
or erosion to suggest osteomyelitis.   2. Extensive vascular calcifications.    
    Signed by: Dr. Familia Brito M.D. on 2018 2:15 PM        Dictated 
By: FAMILIA BRITO MD  Electronically Signed By: FAMILIA BRITO MD on 18  Transcribed By: LAMIN on 18       COPY TO:   AMANDA RENTERIA MD  
                                         

 

                PSA             2018 19:51:00                      

 

   

 

                PROSTATE SPECIFIC ANTIGEN (BEAKER) (test code=844)    0.6 ng/mL       0.0-4.0          





PET, CARDIAC PERFUSION MULTIPLE STUDIES, REST AND [id number] 16:23:00
Reason for Exam:->mac, esrd, cadFINAL REPORT PATIENT ID:   [id number] PROCEDURE:  
  Rest/Stress MYOCARDIAL PERFUSION PET with regadenoson\XA9\ CPT CODE:     70993
INDICATION:     Define extent, severity of known CAD, cardiovascular evaluation 
prior to renal transplantation HISTORY:     Cardiac risk factors: ESRD, 
diabetes, hypertension, obesity, peripheral vascular disease. Other 
cardiovascular history: CAD with prior PCI. Recent cardiac symptoms: None. 
Current cardiovascular-related medications: Aspirin, clopidogrel, losartan. 
PROTOCOL:     Limited low-dose CT imaging was performed for attenuation 
correction. 40.1 mCi of Rb-82 chloride was injected iv at rest, and gated PET 
(positron emission tomography) images were obtained. Subsequently, 40.1 mCi of 
Rb-82 chloride was injected iv at expected peak pharmacologic effect, and gated 
PET images were obtained.  PRELIMINARY STRESS TEST DATA FROM NONINVASIVE 
CARDIOLOGY:     Pharmacologic stress was by 10-second iv infusion of 0.4 mg of 
regadenoson. Radiotracer was injected 30 seconds after start of stress. Heart 
rate was 64 beats/min at rest and 75 beats/min (47% of MPHR) at tracer injection
. BP was 132/88 mmHg at rest and 145/89 mmHg at tracer injection. Stress was sto
pped for predetermined endpoint. The patient experienced no symptoms; treatment 
was not required. Preliminary ECG evaluation revealed sinus rhythm at rest and n
o ischemic changes with stress. (Final ECG interpretation and other stress and m
onitoring data are reported separately by Cardiology.)  IMAGING FINDINGS:     St
udy quality is good. Images obtained after rest and stress injections show gilberto
l LV activity. LV and RV volumes appear normal. Gated images obtained at rest an
d with stress show normal LV wall motion and thickening. LVEF at rest is 61%. LV
EF at stress is 67%. IMPRESSION:     1. Normal study.  2. Appropriate pharmacolo
gic stress.  3. Normal myocardial perfusion.  4. Normal resting LV function. No 
deterioration of function is noted with pharmacologic stress.  5. Normal extraca
rdiac tracer distribution.  6. Compared to previous [facility] study report on 10/13/
2015, the previously mentioned anterolateral defect is no longer seen.   NONINVA
SIVE RISK STRATIFICATION: The above findings are considered low risk (<1% annual
mortality rate) based on the following criterion:- Normal or small myocardial 
perfusion defect at rest or with stress([facility]. 2012;59(9):857-81.) Signed: Donato [facility] Verified Date/Time:  2018 16:23:27 Reading Location: [address] Reading Room   Electronically signed by: DONATO TIRADO M.D. on 2018 04:23 PM CRSMTCH [id number] 09:31:00* 





                Test Item       Value           Reference Range    Comments

 

                CRSMTCH LRD RESULT (BEAKER) (test code=2491)    See Scanned Report                     





[facility] T+B [id number] 09:31:00* 





                Test Item       Value           Reference Range    Comments

 

                [facility] T+B CELLS RESULT (BEAKER) (test code=2492)    See Scanned Report                     





[facility] T+B [id number] 09:30:00* 





                Test Item       Value           Reference Range    Comments

 

                [facility] T+B CELLS RESULT (BEAKER) (test code=2492)    See Scanned Report                     





[facility] FLOW [id number] 09:30:00* 





                Test Item       Value           Reference Range    Comments

 

                [facility] FLOW ADDL RESULT (BEAKER) (test code=2681)    See Scanned Report                     





[facility] FLOW [id number] 09:29:00* 





                Test Item       Value           Reference Range    Comments

 

                CRSMT[facility] FLOW ADDL RESULT (BEAKER) (test code=2681)    See Scanned Report                     





CRSMTCH [id number] 09:29:00* 





                Test Item       Value           Reference Range    Comments

 

                CRSMTCH FLOW RESULT (BEAKER) (test code=2584)                                     





CRS[facility] [id number] 09:29:00* 





                Test Item       Value           Reference Range    Comments

 

                CRSMTCH LRD RESULT (BEAKER) (test code=2491)    See Scanned Report                     





CRS[facility] FLOW [id number] 11:47:00* 





                Test Item       Value           Reference Range    Comments

 

                CRSMTCH FLOW ADDL RESULT (BEAKER) (test code=2681)    See Scanned Report                     





[id number] 11:46:00* 





                Test Item       Value           Reference Range    Comments

 

                CRSMTCH-HIST RESULT (BEAKER) (test code=2488)    See Scanned Report                     





[id number] 11:45:00* 





                Test Item       Value           Reference Range    Comments

 

                CRSMTCH-PRETRANS RESULT (BEAKER) (test code=2490)    See Scanned Report                     





CRSMTCH FLOW [id number] 11:45:00* 





                Test Item       Value           Reference Range    Comments

 

                CRSMTCH FLOW ADDL RESULT (BEAKER) (test code=2681)    See Scanned Report                     





CRS[facility]CH [id number] 11:45:00* 





                Test Item       Value           Reference Range    Comments

 

                CRSMTCH FLOW RESULT (BEAKER) (test code=2584)                                     





[id number] 11:45:00* 





                Test Item       Value           Reference Range    Comments

 

                CRSMT[facility]-CURRENT RESULT (BEAKER) (test code=2489)    See Scanned Report                     





CR[facility] FLOW [id number] 07:34:00* 





                Test Item       Value           Reference Range    Comments

 

                CRSMTCH FLOW ADDL RESULT (BEAKER) (test code=2681)    See Scanned Report                     





CRS[facility] FLOW [id number] 07:32:00* 





                Test Item       Value           Reference Range    Comments

 

                CRSMTCH FLOW ADDL RESULT (BEAKER) (test code=2681)    See Scanned Report                     





CRSMTCH [id number] 07:32:00* 





                Test Item       Value           Reference Range    Comments

 

                CRSMTCH FLOW RESULT (BEAKER) (test code=2584)                                     





[id number] 07:32:00* 





                Test Item       Value           Reference Range    Comments

 

                CRSMTCH-CURRENT RESULT (BEAKER) (test code=2489)    See Scanned Report                     





[id number] 07:32:00* 





                Test Item       Value           Reference Range    Comments

 

                CRSMTCH-HIST RESULT (BEAKER) (test code=2488)    See Scanned Report                     





[id number] 07:31:00* 





                Test Item       Value           Reference Range    Comments

 

                CRSMTCH-PRETRANS RESULT (BEAKER) (test code=2490)    See Scanned Report                     





AB SPECIFICITY CLASS -31-12 11:38:00* 





                Test Item       Value           Reference Range    Comments

 

                AB SPECIFICITY CLASS I (BEAKER) (test code=2429)                                     

 

                DATE OF SERUM (BEAKER) (test code=2289)    [id number]                           

 

                SERUM # (BEAKER) (test code=2290)    [id number]                           





AB SPECIFICITY CLASS -86-87 11:37:00* 





                Test Item       Value           Reference Range    Comments

 

                AB SPECIFICITY CLASS I (BEAKER) (test code=2429)                                     

 

                DATE OF SERUM (BEAKER) (test code=2289)    [id number]                           

 

                SERUM # (BEAKER) (test code=2290)    [id number]                           





AB SPECIFICITY CLASS -14-68 11:10:00* 





                Test Item       Value           Reference Range    Comments

 

                AB SPECIFICITY CLASS I (BEAKER) (test code=2429)                                     

 

                DATE OF SERUM (BEAKER) (test code=2289)    [id number]                           

 

                SERUM # (BEAKER) (test code=2290)    [id number]                           





[id number] 06:23:00* 





                Test Item       Value           Reference Range    Comments

 

                CRSMTCH-HIST RESULT (BEAKER) (test code=2488)    See Scanned Report                     





CRSMTCH FLOW [id number] 06:23:00* 





                Test Item       Value           Reference Range    Comments

 

                CRSMTCH FLOW ADDL RESULT (BEAKER) (test code=2681)    See Scanned Report                     





[id number] 06:22:00* 





                Test Item       Value           Reference Range    Comments

 

                CRSMTCH-PRETRANS RESULT (BEAKER) (test code=2490)    See Scanned Report                     





CRSMTCH FLOW [id number] 06:22:00* 





                Test Item       Value           Reference Range    Comments

 

                CRSMTCH FLOW ADDL RESULT (BEAKER) (test code=2681)    See Scanned Report                     





CRSMTCH [id number] 06:22:00* 





                Test Item       Value           Reference Range    Comments

 

                CRSMTCH FLOW RESULT (BEAKER) (test code=2584)                                     





[id number] 06:22:00* 





                Test Item       Value           Reference Range    Comments

 

                CRSMTCH-CURRENT RESULT (BEAKER) (test code=2489)    See Scanned Report                     





FLOW PRA CLASS I AND [id number] 08:58:00* 





                Test Item       Value           Reference Range    Comments

 

                DATE OF SERUM (BEAKER) (test code=2289)    [id number]                           

 

                SERUM # (BEAKER) (test code=2290)    [id number]                           

 

                FLOW PRA CLASS I AND II (test code=2421)    See Scanned Report                     





FLOW PRA CLASS I AND [id number] 13:54:00* 





                Test Item       Value           Reference Range    Comments

 

                DATE OF SERUM (BEAKER) (test code=2289)    [id number]                           

 

                SERUM # (BEAKER) (test code=2290)    [id number]                           

 

                FLOW PRA CLASS I AND II (test code=2421)    See Scanned Report                     





FLOW PRA CLASS I AND [id number] 09:52:00* 





                Test Item       Value           Reference Range    Comments

 

                DATE OF SERUM (BEAKER) (test code=2289)    [id number]                           

 

                SERUM # (BEAKER) (test code=2290)    [id number]                           

 

                FLOW PRA CLASS I AND II (test code=2421)    See Scanned Report                     





AB SPECIFICITY CLASS -11-15 11:24:00* 





                Test Item       Value           Reference Range    Comments

 

                DATE OF SERUM (BEAKER) (test code=2289)    [id number]                           

 

                SERUM # (BEAKER) (test code=2290)    [id number]                           

 

                AB SPECIFICITY CLASS I (BEAKER) (test code=2429)    See Scanned Report                     





FLOW PRA CLASS I AND [id number] 15:12:00* 





                Test Item       Value           Reference Range    Comments

 

                DATE OF SERUM (BEAKER) (test code=2289)    [id number]                           

 

                SERUM # (BEAKER) (test code=2290)    [id number]                           

 

                FLOW PRA CLASS I AND II (test code=2421)    See Scanned Report                     





CRSMTCH FLOW ADDL2017-10-31 11:44:00* 





                Test Item       Value           Reference Range    Comments

 

                CRSMTCH FLOW ADDL RESULT (BEAKER) (test code=2681)    See Scanned Report                     





CRSMTCH FLOW ADDL2017-10-31 11:43:00* 





                Test Item       Value           Reference Range    Comments

 

                CRSMTCH FLOW ADDL RESULT (BEAKER) (test code=2681)    See Scanned Report                     





CRSMTCH FLOW2017-10-31 11:43:00* 





                Test Item       Value           Reference Range    Comments

 

                CRSMTCH FLOW RESULT (BEAKER) (test code=2584)    See Scanned Report                     





CRSMTCH-CURRENT2017-10-31 11:43:00* 





                Test Item       Value           Reference Range    Comments

 

                CRSMTCH-CURRENT RESULT (BEAKER) (test code=2489)    See Scanned Report                     





CRSMTCH-HIST2017-10-31 11:43:00* 





                Test Item       Value           Reference Range    Comments

 

                CRSMTCH-HIST RESULT (BEAKER) (test code=2488)    See Scanned Report                     





CRSMTCH-PRETRANS2017-10-31 11:43:00* 





                Test Item       Value           Reference Range    Comments

 

                CRSMTCH-PRETRANS RESULT (BEAKER) (test code=2490)    See Scanned Report                     





CRSMTCH FLOW ADDL2017-10-25 14:31:00* 





                Test Item       Value           Reference Range    Comments

 

                CRSMTCH FLOW ADDL RESULT (BEAKER) (test code=2681)    See Scanned Report                     





CRSMTCH FLOW2017-10-25 14:31:00* 





                Test Item       Value           Reference Range    Comments

 

                CRSMTCH FLOW RESULT (BEAKER) (test code=2584)    See Scanned Report                     





CRSMTCH-CURRENT2017-10-25 14:31:00* 





                Test Item       Value           Reference Range    Comments

 

                CRSMTCH-CURRENT RESULT (BEAKER) (test code=2489)    See Scanned Report                     





CRSMTCH-HIST2017-10-25 14:31:00* 





                Test Item       Value           Reference Range    Comments

 

                CRSMTCH-HIST RESULT (BEAKER) (test code=2488)    See Scanned Report                     





CRSMTCH FLOW ADDL2017-10-25 14:31:00* 





                Test Item       Value           Reference Range    Comments

 

                CRSMTCH FLOW ADDL RESULT (BEAKER) (test code=2681)    See Scanned Report                     





CRSMTCH-PRETRANS2017-10-25 14:30:00* 





                Test Item       Value           Reference Range    Comments

 

                CRSMTCH-PRETRANS RESULT (BEAKER) (test code=2490)    See Scanned Report                     





CRSMTCH FLOW ADDL2017-10-18 15:58:00* 





                Test Item       Value           Reference Range    Comments

 

                CRSMTCH FLOW ADDL RESULT (BEAKER) (test code=2681)    See Scanned Report                     





CRSMTCH-PRETRANS2017-10-18 15:57:00* 





                Test Item       Value           Reference Range    Comments

 

                CRSMTCH-PRETRANS RESULT (BEAKER) (test code=2490)    See Scanned Report                     





CRSMTCH FLOW ADDL2017-10-18 15:57:00* 





                Test Item       Value           Reference Range    Comments

 

                CRSMTCH FLOW ADDL RESULT (BEAKER) (test code=2681)    See Scanned Report                     





CRSMTCH FLOW2017-10-18 15:57:00* 





                Test Item       Value           Reference Range    Comments

 

                CRSMTCH FLOW RESULT (BEAKER) (test code=2584)    See Scanned Report                     





CRSMTCH-CURRENT2017-10-18 15:57:00* 





                Test Item       Value           Reference Range    Comments

 

                CRSMTCH-CURRENT RESULT (BEAKER) (test code=2489)    See Scanned Report                     





CRSMTCH-HIST2017-10-18 15:57:00* 





                Test Item       Value           Reference Range    Comments

 

                CRSMTCH-HIST RESULT (BEAKER) (test code=2488)    See Scanned Report                     





AB SPECIFICITY CLASS -61-52 11:56:00* 





                Test Item       Value           Reference Range    Comments

 

                DATE OF SERUM (BEAKER) (test code=2289)    [id number]                           

 

                SERUM # (BEAKER) (test code=2290)    [id number]                           

 

                AB SPECIFICITY CLASS I (BEAKER) (test code=2429)    See Scanned Report                     





CRSMTCH FLOW ADDL2017-10-10 15:50:00* 





                Test Item       Value           Reference Range    Comments

 

                CRSMTCH FLOW ADDL RESULT (BEAKER) (test code=2681)    See Scanned Report                     





CRSMTCH FLOW ADDL2017-10-10 15:49:00* 





                Test Item       Value           Reference Range    Comments

 

                CRSMTCH FLOW ADDL RESULT (BEAKER) (test code=2681)    See Scanned Report                     





CRSMTCH FLOW2017-10-10 15:49:00* 





                Test Item       Value           Reference Range    Comments

 

                CRSMTCH FLOW RESULT (BEAKER) (test code=2584)    See Scanned Report                     





CRSMTCH-CURRENT2017-10-10 15:49:00* 





                Test Item       Value           Reference Range    Comments

 

                CRSMTCH-CURRENT RESULT (BEAKER) (test code=2489)    See Scanned Report                     





CRSMTCH-HIST2017-10-10 15:49:00* 





                Test Item       Value           Reference Range    Comments

 

                CRSMTCH-HIST RESULT (BEAKER) (test code=2488)    See Scanned Report                     





CRSMTCH-PRETRANS2017-10-10 15:49:00* 





                Test Item       Value           Reference Range    Comments

 

                CRSMTCH-PRETRANS RESULT (BEAKER) (test code=2490)    See Scanned Report                     





FLOW PRA CLASS I AND II2017-10-10 15:43:00* 





                Test Item       Value           Reference Range    Comments

 

                DATE OF SERUM (BEAKER) (test code=2289)    [id number]                           

 

                SERUM # (BEAKER) (test code=2290)    [id number]                           

 

                FLOW PRA CLASS I AND II (test code=2421)    See Scanned Report                     





CRS[facility]CH FLOW [id number] 13:00:00* 





                Test Item       Value           Reference Range    Comments

 

                CRSMTCH FLOW ADDL RESULT (BEAKER) (test code=2681)    See Scanned Report                     





CRSMTCH FLOW [id number] 12:59:00* 





                Test Item       Value           Reference Range    Comments

 

                CRSMTCH FLOW ADDL RESULT (BEAKER) (test code=2681)    See Scanned Report                     





CRSMTCH [id number] 12:59:00* 





                Test Item       Value           Reference Range    Comments

 

                CRSMTCH FLOW RESULT (BEAKER) (test code=2584)    See Scanned Report                     





[id number] 12:59:00* 





                Test Item       Value           Reference Range    Comments

 

                CRSMTCH-CURRENT RESULT (BEAKER) (test code=2489)    See Scanned Report                     





[id number] 12:59:00* 





                Test Item       Value           Reference Range    Comments

 

                CRSMTCH-HIST RESULT (BEAKER) (test code=2488)    See Scanned Report                     





[id number] 12:59:00* 





                Test Item       Value           Reference Range    Comments

 

                CRSMTCH-PRETRANS RESULT (BEAKER) (test code=2490)    See Scanned Report                     





[id number] 12:45:00* 





                Test Item       Value           Reference Range    Comments

 

                CRSMTCH-HIST RESULT (BEAKER) (test code=2488)    See Scanned Report                     





[id number] 12:45:00* 





                Test Item       Value           Reference Range    Comments

 

                CRSMTCH-PRETRANS RESULT (BEAKER) (test code=2490)    See Scanned Report                     





CRSMTCH FLOW [id number] 12:45:00* 





                Test Item       Value           Reference Range    Comments

 

                CRSMTCH FLOW ADDL RESULT (BEAKER) (test code=2681)    See Scanned Report                     





CRSMTCH FLOW [id number] 12:44:00* 





                Test Item       Value           Reference Range    Comments

 

                CRSMTCH FLOW ADDL RESULT (BEAKER) (test code=2681)    See Scanned Report                     





CRSMTCH [id number] 12:44:00* 





                Test Item       Value           Reference Range    Comments

 

                CRSMTCH FLOW RESULT (BEAKER) (test code=2584)    See Scanned Report                     





[id number] 12:44:00* 





                Test Item       Value           Reference Range    Comments

 

                CRSMTCH-CURRENT RESULT (BEAKER) (test code=2489)    See Scanned Report                     





AB SPECIFICITY CLASS -[id number] 11:29:00* 





                Test Item       Value           Reference Range    Comments

 

                DATE OF SERUM (BEAKER) (test code=2289)    [id number]                           

 

                SERUM # (BEAKER) (test code=2290)    [id number]                           

 

                AB SPECIFICITY CLASS I (BEAKER) (test code=2429)    See Scanned Report                     





AB SPECIFICITY CLASS -[id number] 15:34:00* 





                Test Item       Value           Reference Range    Comments

 

                DATE OF SERUM (BEAKER) (test code=2289)    [id number]                           

 

                SERUM # (BEAKER) (test code=2290)    [id number]                           

 

                AB SPECIFICITY CLASS I (BEAKER) (test code=2429)    See Scanned Report                     





[facility] FLOW [id number] 10:54:00* 





                Test Item       Value           Reference Range    Comments

 

                [facility] FLOW ADDL RESULT (BEAKER) (test code=2681)    See Scanned Report                     





[facility] [id number] 10:54:00* 





                Test Item       Value           Reference Range    Comments

 

                [facility] FLOW RESULT (BEAKER) (test code=2584)    See Scanned Report                     





[id number] 10:54:00* 





                Test Item       Value           Reference Range    Comments

 

                [facility]-CURRENT RESULT (BEAKER) (test code=2489)    See Scanned Report                     





[id number] 10:54:00* 





                Test Item       Value           Reference Range    Comments

 

                [facility]-HIST RESULT (BEAKER) (test code=2488)    See Scanned Report                     





[id number] 10:54:00* 





                Test Item       Value           Reference Range    Comments

 

                [facility]-PRETRANS RESULT (BEAKER) (test code=2490)    See Scanned Report                     





FLOW PRA CLASS I AND [id number] 10:07:00* 





                Test Item       Value           Reference Range    Comments

 

                DATE OF SERUM (BEAKER) (test code=2289)    [id number]                           

 

                SERUM # (BEAKER) (test code=2290)    [id number]                           

 

                FLOW PRA CLASS I AND II (test code=2421)    See Scanned Report                     





FLOW PRA CLASS I AND [id number] 09:56:00* 





                Test Item       Value           Reference Range    Comments

 

                DATE OF SERUM (BEAKER) (test code=2289)    [id number]                           

 

                SERUM # (BEAKER) (test code=2290)    [id number]                           

 

                FLOW PRA CLASS I AND II (test code=2421)    See Scanned Report                     





FLOW PRA CLASS I AND [id number] 12:49:00* 





                Test Item       Value           Reference Range    Comments

 

                DATE OF SERUM (BEAKER) (test code=2289)    [id number]                           

 

                SERUM # (BEAKER) (test code=2290)    [id number]                           

 

                FLOW PRA CLASS I AND II (test code=2421)    See Scanned Report                     





AB SPECIFICITY CLASS -07-10 10:56:00* 





                Test Item       Value           Reference Range    Comments

 

                DATE OF SERUM (BEAKER) (test code=2289)    [id number]                           

 

                SERUM # (BEAKER) (test code=2290)    [id number]                           

 

                AB SPECIFICITY CLASS I (BEAKER) (test code=2429)    See Scanned Report                     





[id number] 15:14:00* 





                Test Item       Value           Reference Range    Comments

 

                [facility]-PRETRANS RESULT (BEAKER) (test code=2490)    See Scanned Report                     





[facility] FLOW [id number] 15:14:00* 





                Test Item       Value           Reference Range    Comments

 

                [facility] FLOW ADDL RESULT (BEAKER) (test code=2681)    See Scanned Report                     





[facility] [id number] 15:14:00* 





                Test Item       Value           Reference Range    Comments

 

                [facility] FLOW RESULT (BEAKER) (test code=2584)    See Scanned Report                     





[id number] 15:14:00* 





                Test Item       Value           Reference Range    Comments

 

                [facility]-CURRENT RESULT (BEAKER) (test code=2489)    See Scanned Report                     





[id number] 15:14:00* 





                Test Item       Value           Reference Range    Comments

 

                [facility]-HIST RESULT (BEAKER) (test code=2488)    See Scanned Report                     





AB SPECIFICITY CLASS -94-59 11:54:00* 





                Test Item       Value           Reference Range    Comments

 

                DATE OF SERUM (BEAKER) (test code=2289)    [id number]                           

 

                SERUM # (BEAKER) (test code=2290)    [id number]                           

 

                AB SPECIFICITY CLASS I (BEAKER) (test code=2429)    See Scanned Report                     





FLOW PRA CLASS I AND [id number] 10:51:00* 





                Test Item       Value           Reference Range    Comments

 

                DATE OF SERUM (BEAKER) (test code=2289)    [id number]                           

 

                SERUM # (BEAKER) (test code=2290)    [id number]                           

 

                FLOW PRA CLASS I AND II (test code=2421)    See Scanned Report                     





[facility] FLOW [id number] 10:18:00* 





                Test Item       Value           Reference Range    Comments

 

                [facility] FLOW ADDL RESULT (BEAKER) (test code=2681)    See Scanned Report                     





[facility] [id number] 10:17:00* 





                Test Item       Value           Reference Range    Comments

 

                [facility] FLOW RESULT (BEAKER) (test code=2584)    See Scanned Report                     





[facility] FLOW [id number] 10:17:00* 





                Test Item       Value           Reference Range    Comments

 

                [facility] FLOW ADDL RESULT (BEAKER) (test code=2681)    See Scanned Report                     





[id number] 10:12:00* 





                Test Item       Value           Reference Range    Comments

 

                [facility]-PRETRANS RESULT (BEAKER) (test code=2490)    See Scanned Report                     





[id number] 10:12:00* 





                Test Item       Value           Reference Range    Comments

 

                [facility]-CURRENT RESULT (BEAKER) (test code=2489)    See Scanned Report                     





[id number] 10:12:00* 





                Test Item       Value           Reference Range    Comments

 

                [facility]-HIST RESULT (BEAKER) (test code=2488)    See Scanned Report                     





FLOW PRA CLASS I AND [id number] 11:31:00* 





                Test Item       Value           Reference Range    Comments

 

                DATE OF SERUM (BEAKER) (test code=2289)    [id number]                           

 

                SERUM # (BEAKER) (test code=2290)    [id number]                           

 

                FLOW PRA CLASS I AND II (test code=2421)    See Scanned Report                     





POTASSIUM-STAT [id number] 06:44:00* 





                Test Item       Value           Reference Range    Comments

 

                POTASSIUM (BEAKER) (test code=379)    3.9 meq/L       3.6-5.5          





POTASSIUM-STAT [id number] 06:29:00* 





                Test Item       Value           Reference Range    Comments

 

                POTASSIUM (BEAKER) (test code=379)    6.3 meq/L       3.6-5.5          





GLUCOSE-STAT [id number] 06:17:00* 





                Test Item       Value           Reference Range    Comments

 

                GLUCOSE RANDOM (BEAKER) (test code=652)    229 mg/dL                  





AB SPECIFICITY CLASS  12:22:00* 





                Test Item       Value           Reference Range    Comments

 

                DATE OF SERUM (BEAKER) (test code=2289)    [id number]                           

 

                SERUM # (BEAKER) (test code=2290)    [id number]                           

 

                AB SPECIFICITY CLASS I (BEAKER) (test code=2429)    See Scanned Report                     





AB SPECIFICITY CLASS  12:02:00* 





                Test Item       Value           Reference Range    Comments

 

                DATE OF SERUM (BEAKER) (test code=2289)    [id number]                           

 

                SERUM # (BEAKER) (test code=2290)    [id number]                           

 

                AB SPECIFICITY CLASS I (BEAKER) (test code=2429)    See Scanned Report                     





FLOW PRA CLASS I AND [id number] 12:31:00* 





                Test Item       Value           Reference Range    Comments

 

                DATE OF SERUM (BEAKER) (test code=2289)    [id number]                           

 

                SERUM # (BEAKER) (test code=2290)    [id number]                           

 

                FLOW PRA CLASS I AND II (test code=2421)    See Scanned Report                     





AB SPECIFICITY CLASS  07:26:00* 





                Test Item       Value           Reference Range    Comments

 

                DATE OF SERUM (BEAKER) (test code=2289)    [id number]                           

 

                SERUM # (BEAKER) (test code=2290)    [id number]                            

 

                AB SPECIFICITY CLASS I (BEAKER) (test code=2429)    See Scanned Report

## 2019-01-03 NOTE — XMS REPORT
Clinical Summary

                             Created on: 2019



Alexis Alvarez

External Reference #: [id number]

: 1955

Sex: Male



Demographics







                          Address                   [address] [place]  [address]

 

                          Home Phone                [phone]

 

                          Preferred Language        Unknown

 

                          Marital Status            Unknown

 

                          Mormonism Affiliation     Gnosticism

 

                          Race                      White

 

                          Ethnic Group              /Latin





Author







                          Author                    [facility]

 

                          Address                   Unknown

 

                          Phone                     Unavailable







Support







                Name            Relationship    Address         Phone

 

                    Zabrina Alvarez    ECON                [address] [place]  [address]                     [phone]

 

                    Bishop Alvarez       ECON                [address] [place]  [address]                     [phone]







Care Team Providers







                    Care Team Member Name    Role                Phone

 

                    Chauncey Villagomez    PCP                 [phone]







Allergies







                                        Comments



                 Active Allergy     Reactions       Severity        Noted Date 

 

                                        



bandaids



                 Adhesive        Other (See      High            2013 



                                         Comments)   







Medications







                          End Date                  Status



              Medication     Sig          Dispensed     Refills      Start  



                                         Date  

 

                                                    Active



                     insulin glargine (LANTUS)     Inject 12           0   



                           100 unit/mL injection     Units     



                                         subcutaneousl     



                                         y nightly Pt     



                                         states that     



                                         he uses 12-20     



                                         units based     



                                         upon his BS     



                                         and diet.     

 

                                                    Active



                     sevelamer (RENVELA) 800     Take 3,200 mg       0   



                           mg tablet                 by mouth 3     



                                         (three) times     



                                         daily with     



                                         meals Taking     



                                         3 with each     



                                         meal and 1     



                                         with snack.     

 

                                                    Active



                     nateglinide (STARLIX) 120     Take 120 mg         0   



                           MG tablet                 by mouth 3     



                                         (three) times     



                                         daily before     



                                         meals.     

 

                                                    Active



                     gabapentin (NEURONTIN)     Take 300 mg         0   



                           300 MG capsule            by mouth     



                                         daily .     

 

                                                    Active



                     aspirin 81 MG EC tablet     Take 81 mg by       0   



                                         mouth daily.     

 

                                                    Active



                     OMEGA-3/DHA/EPA/FISH OIL     Take 1 g by         0   



                           (FISH OIL-OMEGA-3 FATTY     mouth 2 (two)     



                           ACIDS) 300-1,000 mg       times daily .     



                                         capsule      

 

                                                    Active



                     valsartan (DIOVAN) 80 MG     Take 160 mg         0   



                           tablet                    by mouth     



                                         daily as     



                                         needed (Only     



                                         takes about     



                                         once a week     



                                         if/when the     



                                         BP is greater     



                                         than 150     



                                         systolic) .     

 

                                                    Active



                     insulin aspart (NOVOLOG)     Inject 10           0   



                           100 unit/mL InPn          Units     



                                         subcutaneousl     



                                         y 2 (two)     



                                         times daily     



                                         Pt reports     



                                         that he only     



                                         takes from     



                                         8-15 units     



                                         based upon     



                                         the BS.     

 

                                                    Active



                     cholecalciferol (VITAMIN     Take 1,000          0   



                           D3) 1,000 unit tablet     Units by     



                                         mouth daily.     

 

                                                    Active



                     pantoprazole (PROTONIX)     Take 40 mg by       0   



                           40 MG tablet              mouth daily.     

 

                                                    Active



                     pentoxifylline (TRENTAL)     Take 400 mg         0   



                           400 mg CR tablet          by mouth 3     



                                         (three) times     



                                         daily with     



                                         meals.     

 

                                                    Active



                     fenofibrate (TRICOR) 48     Take 48 mg by       0   



                           MG tablet                 mouth daily.     

 

                          2018                Discontinued



                     ciprofloxacin HCl (CIPRO)     Take 500 mg         0   



                           500 MG tablet             by mouth 2     



                                         (two) times     



                                         daily.     

 

                          2018                Discontinued



                     clopidogrel (PLAVIX) 300     Take 75 mg by       0   



                           mg Tab                    mouth once.     







Active Problems







 



                           Problem                   Noted Date

 

 



                           Pre-transplant evaluation for chronic kidney disease     2017

 

 



                                         Last Assessment & Plan:



                                         He is an acceptable candidate for kidney transplant. Kidney will have to be



                                         placed on the right secondary to his extensive vascular disease on the



                                         left. The left great big toe is healed but still appears to be a chronic



                                         callus.

 

 



                           Peripheral artery disease     10/17/2016

 

 



                                         Last Assessment & Plan:



                                         Continue outpatient with podiatry for LASHAWN.

 

 



                           Ischemic ulcer diabetic foot     10/17/2016

 

 



                                         Overview:



                                         Left great toe neuro-ischemic ulcer

 

 



                           Obese                     2016

 

 



                                         Overview:



                                         Needs to reduce weight to target weight of 240 with improved diet and



                                         exercise.





                                         L



                                         ast Assessment & Plan:



                                         He was encouraged to monitor his intake and to exercise as tolerated.

 

 



                           CAD (coronary artery disease)     12/10/2015

 

 



                                         Last Assessment & Plan:



                                         Patient needs a CTA to evaluate for runoffs once he achieves his weight



                                         goal. He will also be at increased risk of bleeding because he is on



                                         Plavix.

 

 



                                         ESRD (end stage renal disease) 

 

 



                                         Last Assessment & Plan:



                                         ESRD secondary to DM/HTN. He has required hemodialysis for 6 years and does



                                         not produce urine.

 

 



                                         DM (diabetes mellitus) 

 

 



                                         Last Assessment & Plan:



                                         Diabetes management per primary care physician.

 

 



                                         Retinopathy 

 

 



                                         HTN (hypertension) 

 

 



                                         Last Assessment & Plan:



                                         Continue current management.

 

 



                                         PAD (peripheral artery disease) 

 

 



                                         Retinopathy 







Encounters







                          Care Team                 Description



                     Date                Type                Specialty  

 

                                        



Raysa Luz RN                        Waitlist Maintenance



                     10/22/2018          Telephone           Transplant  

 

                                        



Raysa Luz RN                        Waitlist Maintenance



                     08/15/2018          Telephone           Transplant  

 

                                        



Raysa Luz RN                        Waitlist Maintenance



                     2018          Telephone           Transplant  

 

                                        



Raysa Luz RN                         



                     2018          Documentation       Transplant  

 

                                        



ORadha Mahoney MD             ESRD (end stage renal disease) (HCC); 

Patient awaiting renal transplant; 

Type 2 diabetes mellitus with other kidney complication, unspecified long term 
insulin use status (HCC)



                     2018          Orders Only         Lab  

 

                                        



Candy Jeffries MD              



                           2018                Orders Only   

 

                                        



Radha Alberto MD             ESRD (end stage renal disease) (HCC); 

Patient awaiting renal transplant; 

Type 2 diabetes mellitus with other kidney complication, unspecified long term 
insulin use status (HCC)



                     2018          Orders Only         Transplant Hepatology  

 

                                        



Asha Garcia MD Etheridge, Whitson B., MD               ESRD (end stage renal disease) (HCC) (Primary Dx)



                     2018          Evaluation          Transplant  

 

                                        



Asha Garcia MD               Encounter for preprocedural cardiovascular examination

 ; 

ESRD (end stage renal disease) (HCC); 

Patient awaiting renal transplant; 

Type 2 diabetes mellitus with other kidney complication, unspecified long term 
insulin use status (HCC)



                     2018          Hospital            Radiology  



                                         Encounter   

 

                                        



Radha Alberto MD             ESRD (end stage renal disease) (HCC); 

Patient awaiting renal transplant; 

Type 2 diabetes mellitus with other kidney complication, unspecified long term 
insulin use status (HCC)



                     02/15/2018          Orders Only         Lab  

 

                                                    ESRD (end stage renal disease) (HCC) (Primary Dx); 

Patient awaiting renal transplant; 

Type 2 diabetes mellitus with other kidney complication, unspecified long term 
insulin use status (HCC)



                     2018          Orders Only         Lab  

 

                                        



Kayleigh, Mariella                          



                     2018          Documentation       Transplant  

 

                                        



Raysa Luz RN                         



                     01/15/2018          Orders Only         Transplant  

 

                                        



Kayleigh, Mariella                          



                     01/10/2018          Documentation       Transplant  

 

                                        



Raysa Luz, RN                        ESRD (end stage renal disease) (HCC) (Primary Dx); 

Patient awaiting renal transplant; 

Type 2 diabetes mellitus with other kidney complication, unspecified long term 
insulin use status (HCC); 

Encounter for preprocedural cardiovascular examination 



                     2018          Orders Only         Transplant  

 

                                        



Jason Avery MD                



                     2018          Lab Requisition     Lab  

 

                                        



Jason Avery MD                



                     2018          Lab Requisition     Lab  



after 2018



Social History







                                        Date



                 Tobacco Use     Types           Packs/Day       Years Used 

 

                                        Quit: 10/04/2003



                                         Former Smoker    

 

    



                                         Smokeless Tobacco: Never   



                                         Used   









   



                 Alcohol Use     Drinks/Week     oz/Week         Comments

 

   



                                         No   









 



                           Sex Assigned at Birth     Date Recorded

 

 



                                         Not on file 









                                        Industry



                           Job Start Date            Occupation 

 

                                        Not on file



                           Not on file               Not on file 









                                        Travel End



                           Travel History            Travel Start 

 





                                         No recent travel history available.







Last Filed Vital Signs







                                        Time Taken



                           Vital Sign                Reading 

 

                                        2018  2:59 PM CST



                           Blood Pressure            159/75 

 

                                        2018  2:59 PM CST



                           Pulse                     76 

 

                                        2018  2:59 PM CST



                           Temperature               36.7 C (98.1 F) 

 

                                        2018  2:59 PM CST



                           Respiratory Rate          18 

 

                                        -



                           Oxygen Saturation         - 

 

                                        -



                           Inhaled Oxygen            - 



                                         Concentration  

 

                                        2018  2:59 PM CST



                           Weight                    110.9 kg (244 lb 9.6 oz) 

 

                                        2018  2:59 PM CST



                           Height                    177.2 cm (5' 9.75") 

 

                                        2018  2:59 PM CST



                           Body Mass Index           35.35 







Plan of Treatment







   



                 Health Maintenance     Due Date        Last Done       Comments

 

   



                           INFLUENZA VACCINE         10/01/2018  







Implants







                    Device Identifier    Shelf Expiration Date    Model / Serial / Lot



                 Implanted       Type            Area            Manufactur   



                                         er   

 

                                        2018          87847-36 /

 /

                                        [id number]



                     Closure Sys Perclose Progl 6fr     Cardiovasc          [employer]   



                     10243-06 - [id number]     chava                LAB:VASC   



                           Implanted: Qty: 1 on 2016 by       Deejay Armstrong MD      

 

                                        2018          [id number] /

 /

                                        [id number]



                 Device Clsr Angio-Seal Vip 6fr     Cardiovasc      N/A: Groin      [facility]   



                     [id number] - [id number]     ular                MED:CARDIA   



                           Implanted: Qty: 1 on 2017 by       Deejay Alvarez MD      

 

                                        2018          [id number] - 12 /

 /

                                        [id number] 



                           Xience Alpine Otw         [employer]   



                           Implanted: Qty: 1 on 12/10/2015       VASCULAR   



                                         DEVICE   







Procedures







                                        Comments



                 Procedure Name     Priority        Date/Time       Associated Diagnosis 

 

                                        



Results for this procedure are in the results section.



                 VIRTUAL CROSSMATCH     Routine         2018      ESRD (end stage renal 



                           1:13 PM CDT               disease) (HCC) 



                                         Patient awaiting renal 



                                         transplant 



                                         Type 2 diabetes mellitus 



                                         with other kidney 



                                         complication, unspecified 



                                         long term insulin use 



                                         status 

 

                                        



Results for this procedure are in the results section.



                 CRSMTCH FLOW 3 SERUM     Routine         2018      ESRD (end stage renal 



                           1:13 PM CDT               disease) (HCC) 



                                         Patient awaiting renal 



                                         transplant 



                                         Type 2 diabetes mellitus 



                                         with other kidney 



                                         complication, unspecified 



                                         long term insulin use 



                                         status 

 

                                        



Results for this procedure are in the results section.



                 CRSMTCH SEROLOGY FV 3     Routine         2018      ESRD (end stage renal 



                           1:13 PM CDT               disease) (HCC) 



                                         Patient awaiting renal 



                                         transplant 



                                         Type 2 diabetes mellitus 



                                         with other kidney 



                                         complication, unspecified 



                                         long term insulin use 



                                         status 

 

                                        



Results for this procedure are in the results section.



                 AB SPECIFICITY CLASS I     Routine         2018      ESRD (end stage renal 



                           7:36 AM CDT               disease) (HCC) 



                                         Patient awaiting renal 



                                         transplant 



                                         Type 2 diabetes mellitus 



                                         with other kidney 



                                         complication, unspecified 



                                         long term insulin use 



                                         status 

 

                                        



Results for this procedure are in the results section.



                 FLOW PRA CLASS II WITH     Routine         2018      ESRD (end stage renal 



                     REFLEX TO ANTIBODY      7:36 AM CDT         disease) (HCC) 



                           SPECIFICITY               Patient awaiting renal 



                                         transplant 



                                         Type 2 diabetes mellitus 



                                         with other kidney 



                                         complication, unspecified 



                                         long term insulin use 



                                         status 

 

                                        



Results for this procedure are in the results section.



                 FLOW PRA CLASS I WITH     Routine         2018      ESRD (end stage renal 



                     REFLEX TO ANTIBODY      7:36 AM CDT         disease) (HCC) 



                           SPECIFICITY               Patient awaiting renal 



                                         transplant 



                                         Type 2 diabetes mellitus 



                                         with other kidney 



                                         complication, unspecified 



                                         long term insulin use 



                                         status 

 

                                        



Results for this procedure are in the results section.



                 AB SPECIFICITY CLASS I     Routine         2018      ESRD (end stage renal 



                           2:33 PM CST               disease) (HCC) 



                                         Patient awaiting renal 



                                         transplant 



                                         Type 2 diabetes mellitus 



                                         with other kidney 



                                         complication, unspecified 



                                         long term insulin use 



                                         status (HCC) 

 

                                        



Results for this procedure are in the results section.



                 PSA             Routine         2018      ESRD (end stage renal 



                           2:33 PM CST               disease) (HCC) 



                                         Patient awaiting renal 



                                         transplant 



                                         Type 2 diabetes mellitus 



                                         with other kidney 



                                         complication, unspecified 



                                         long term insulin use 



                                         status (HCC) 

 

                                        



Results for this procedure are in the results section.



                 FLOW PRA CLASS II WITH     Routine         2018      ESRD (end stage renal 



                     REFLEX TO ANTIBODY      2:33 PM CST         disease) (HCC) 



                           SPECIFICITY               Patient awaiting renal 



                                         transplant 



                                         Type 2 diabetes mellitus 



                                         with other kidney 



                                         complication, unspecified 



                                         long term insulin use 



                                         status (HCC) 

 

                                        



Results for this procedure are in the results section.



                 FLOW PRA CLASS I WITH     Routine         2018      ESRD (end stage renal 



                     REFLEX TO ANTIBODY      2:33 PM CST         disease) (HCC) 



                           SPECIFICITY               Patient awaiting renal 



                                         transplant 



                                         Type 2 diabetes mellitus 



                                         with other kidney 



                                         complication, unspecified 



                                         long term insulin use 



                                         status (HCC) 

 

                                        



Results for this procedure are in the results section.



                 NM CARDIAC PET PERFUSION     Routine         2018      Encounter for 



                     REST AND/OR STRESS      2:12 PM CST         preprocedural 



                                         cardiovascular 



                                         examination 



                                         ESRD (end stage renal 



                                         disease) (HCC) 



                                         Patient awaiting renal 



                                         transplant 



                                         Type 2 diabetes mellitus 



                                         with other kidney 



                                         complication, unspecified 



                                         long term insulin use 



                                         status (HCC) 

 

                                        



Results for this procedure are in the results section.



                     TREADMILL           Routine             2018  



                           TOLERANCE(NON-NUCLEAR      1:55 PM CST  



                                         TREADMILL)    

 

                                        



Results for this procedure are in the results section.



                 FLOW PRA CLASS II WITH     Routine         02/15/2018      ESRD (end stage renal 



                     REFLEX TO ANTIBODY      10:23 AM CST        disease) (HCC) 



                           SPECIFICITY               Patient awaiting renal 



                                         transplant 



                                         Type 2 diabetes mellitus 



                                         with other kidney 



                                         complication, unspecified 



                                         long term insulin use 



                                         status (HCC) 

 

                                        



Results for this procedure are in the results section.



                 FLOW PRA CLASS I WITH     Routine         02/15/2018      ESRD (end stage renal 



                     REFLEX TO ANTIBODY      10:23 AM CST        disease) (HCC) 



                           SPECIFICITY               Patient awaiting renal 



                                         transplant 



                                         Type 2 diabetes mellitus 



                                         with other kidney 



                                         complication, unspecified 



                                         long term insulin use 



                                         status (HCC) 

 

                                        



Results for this procedure are in the results section.



                 CRSMTCH FLOW 3 SERUM     Routine         02/15/2018      ESRD (end stage renal 



                           10:23 AM CST              disease) (HCC) 



                                         Patient awaiting renal 



                                         transplant 



                                         Type 2 diabetes mellitus 



                                         with other kidney 



                                         complication, unspecified 



                                         long term insulin use 



                                         status (HCC) 

 

                                        



Results for this procedure are in the results section.



                 CRSMTCH SEROLOGY FV 3     Routine         02/15/2018      ESRD (end stage renal 



                           10:23 AM CST              disease) (HCC) 



                                         Patient awaiting renal 



                                         transplant 



                                         Type 2 diabetes mellitus 



                                         with other kidney 



                                         complication, unspecified 



                                         long term insulin use 



                                         status (HCC) 

 

                                        



Results for this procedure are in the results section.



                 AB SPECIFICITY CLASS I     Routine         02/15/2018      ESRD (end stage renal 



                           8:38 AM CST               disease) (HCC) 



                                         Patient awaiting renal 



                                         transplant 



                                         Type 2 diabetes mellitus 



                                         with other kidney 



                                         complication, unspecified 



                                         long term insulin use 



                                         status (HCC) 

 

                                        



Results for this procedure are in the results section.



                 CRSMTCH FLOW 3 SERUM     Routine         2018      ESRD (end stage renal 



                           5:45 AM CST               disease) (HCC) 



                                         Patient awaiting renal 



                                         transplant 



                                         Type 2 diabetes mellitus 



                                         with other kidney 



                                         complication, unspecified 



                                         long term insulin use 



                                         status (HCC) 

 

                                        



Results for this procedure are in the results section.



                 CRSMTCH SEROLOGY FV 3     Routine         2018      ESRD (end stage renal 



                           5:45 AM CST               disease) (HCC) 



                                         Patient awaiting renal 



                                         transplant 



                                         Type 2 diabetes mellitus 



                                         with other kidney 



                                         complication, unspecified 



                                         long term insulin use 



                                         status (HCC) 

 

                                        



Results for this procedure are in the results section.



                 CRSMTCH FLOW 3 SERUM     Routine         2018      ESRD (end stage renal 



                           12:09 PM CST              disease) (HCC) 



                                         Patient awaiting renal 



                                         transplant 



                                         Type 2 diabetes mellitus 



                                         with other kidney 



                                         complication, unspecified 



                                         long term insulin use 



                                         status (HCC) 

 

                                        



Results for this procedure are in the results section.



                 CRSMTCH SEROLOGY FV 3     Routine         2018      ESRD (end stage renal 



                           12:09 PM CST              disease) (HCC) 



                                         Patient awaiting renal 



                                         transplant 



                                         Type 2 diabetes mellitus 



                                         with other kidney 



                                         complication, unspecified 



                                         long term insulin use 



                                         status (HCC) 

 

                                        



Results for this procedure are in the results section.



                 FLOW PRA CLASS II WITH     Routine         2018      ESRD (end stage renal 



                     REFLEX TO ANTIBODY      12:09 PM CST        disease) (HCC) 



                           SPECIFICITY               Patient awaiting renal 



                                         transplant 



                                         Type 2 diabetes mellitus 



                                         with other kidney 



                                         complication, unspecified 



                                         long term insulin use 



                                         status (HCC) 

 

                                        



Results for this procedure are in the results section.



                 FLOW PRA CLASS I WITH     Routine         2018      ESRD (end stage renal 



                     REFLEX TO ANTIBODY      12:09 PM CST        disease) (HCC) 



                           SPECIFICITY               Patient awaiting renal 



                                         transplant 



                                         Type 2 diabetes mellitus 



                                         with other kidney 



                                         complication, unspecified 



                                         long term insulin use 



                                         status (HCC) 

 

                                        



Results for this procedure are in the results section.



                 CRSMTCH FLOW 3 SERUM     Routine         2018      ESRD (end stage renal 



                           12:07 PM CST              disease) (HCC) 



                                         Patient awaiting renal 



                                         transplant 



                                         Type 2 diabetes mellitus 



                                         with other kidney 



                                         complication, unspecified 



                                         long term insulin use 



                                         status (HCC) 

 

                                        



Results for this procedure are in the results section.



                 CRSMTCH SEROLOGY FV 3     Routine         2018      ESRD (end stage renal 



                           12:07 PM CST              disease) (HCC) 



                                         Patient awaiting renal 



                                         transplant 



                                         Type 2 diabetes mellitus 



                                         with other kidney 



                                         complication, unspecified 



                                         long term insulin use 



                                         status (HCC) 

 

                                        



Results for this procedure are in the results section.



                 AB SPECIFICITY CLASS I     Routine         2018      ESRD (end stage renal 



                           6:19 AM CST               disease) (HCC) 



                                         Patient awaiting renal 



                                         transplant 



                                         Type 2 diabetes mellitus 



                                         with other kidney 



                                         complication, unspecified 



                                         long term insulin use 



                                         status (HCC) 

 

                                        



Results for this procedure are in the results section.



                     CRSMTCH FLOW 3 SERUM     Routine             2018  



                                         12:00 PM CST  

 

                                        



Results for this procedure are in the results section.



                     CRSMTCH SEROLOGY FV 3     Routine             2018  



                                         12:00 PM CST  



after 2018



Results

* CRSMTCH SEROLOGY FV 3 (2018  1:13 PM CDT)



Only the most recent of 6 results within the time period is included.





   



                 Component       Value           Ref Range       Performed At

 

   



                     CDC-XM FV1 AHG      AHG                 [facility]

 

   



                     CDC-XM FV1 AHG RES     Neg                 [facility]

 

   



                           CDC-XMFV1 AHG/DTT         [facility]

 

   



                           CDC-XMFV1 AHG/DTT RES       [facility]

 

   



                     CDC-XM FV2 AHG      AHG                 [facility]

 

   



                     CDC-XM FV2 AHG RES     Neg                 [facility]

 

   



                           CDC-XMFV2 AHG/DTT         [facility]

 

   



                           CDC-XMFV2 AHG/DTT RES       [facility]

 

   



                     CDC-XM FV3 AHG      AHG                 [facility]

 

   



                     CDC-XM FV3 AHG RES     Neg                 [facility]

 

   



                           CDC-XMFV3 AHG/DTT         [facility]

 

   



                           CDC-XMFV3 AHG/DTT RES       [facility]

 

   



                     Crsmtch Serology FV 3     DD [facility] XM: NEGATIVE      [facility]



                                         Comment   

 

   



                     [facility] ID             [id number]             [facility]

 

   



                     Donor Name          [id number], DD-LOCAL      [facility]













                                         Specimen

 





                                         Blood









   



                 Performing Organization     Address         City/State/Zipcode     Phone Number

 

   



                     [facility]     [address] [place]:  [id number],     [place] [address] 



                                         CLIA#[id number] CAP#[id number]  



                                         UNOS#[id number]  

 

   



                     [facility]     [address], [place]:  [id number]     [place] [address] 





* VIRTUAL CROSSMATCH (2018  1:13 PM CDT)





   



                 Component       Value           Ref Range       Performed At

 

   



                     [facility]            [place]      [facility]

 

   



                     Donor HLA Result     A1 A24; B62 B57; Cw9 Cw6; DR7      [facility]



                                         DR14; DR52; DQA1*01, 02; DQ9  



                                         DQ5; DPA1*01,01; DPB1*02:01,  



                                         DPB1*04:01  

 

   



                     VX DSA (MFI)       DSA and MFI         [facility]

 

   



                     DSA and MFI Result     NO DSA FOUND        [facility]

 

   



                     Virtual XM          Virtual XM Result      [facility]

 

   



                     VX Result          CURRENT NEGATIVE      [facility]

 

   



                           Virtual Crossmatch        [facility]



                                         Comment   

 

   



                     UNOS ID             [id number]             [facility]

 

   



                     Donor Name          [id number], DD-LOCAL      [facility]













                                         Specimen

 





                                         Blood









   



                 Performing Organization     Address         City[facility]     Phone Number

 

   



                     [facility]     [address], [address]:  [id number],     [place] [address] 



                                         CLIA#[id number] CAP#[id number]  



                                         UNOS#[id number]  

 

   



                     [facility]     [address]:  [id number]     [place] [address] 





* CRSMTCH FLOW 3 SERUM (2018  1:13 PM CDT)



Only the most recent of 6 results within the time period is included.





   



                 Component       Value           Ref Range       Performed At

 

   



                     T-FXM Serum1        T(IgG)              [facility]

 

   



                     T-FXM Serum1 Res     Neg                 [facility]

 

   



                     B-FXM Serum1        B(IgG)              [facility]

 

   



                     B-FXM Serum1 Res     Neg                 [facility]

 

   



                     T-FXM Serum2        T(IgG)              [facility]

 

   



                     T-FXM Serum2 Res     Neg                 [facility]

 

   



                     B-FXM Serum2        B(IgG)              [facility]

 

   



                     B-FXM Serum2 Res     Neg                 [facility]

 

   



                     T-FXM Serum3        T(IgG)              [facility]

 

   



                     T-FXM Serum3 Res     Neg                 [facility]

 

   



                     B-FXM Serum3        B(IgG)              [facility]

 

   



                     B-FXM Serum3 Res     Neg                 [facility]

 

   



                     Crsmtch Flow Comment     DD FLOW XM: NEGATIVE      [facility]

 

   



                     UNOS ID             [id number]             [facility]

 

   



                     Donor Name          [id number], DD-LOCAL      [facility]













                                         Specimen

 





                                         Blood









   



                 Performing Organization     Address         [facility]     Phone Number

 

   



                     [facility]     [address]  [id number],     [place] [address] 



                                         CLIA#[id number] CAP#[id number]  



                                         UNOS#[id number]  

 

   



                     [facility]     [address]:  [id number]     [place] [address] 





* FLOW PRA CLASS II WITH REFLEX TO ANTIBODY SPECIFICITY (2018  7:36 AM 
  CDT)



Only the most recent of 4 results within the time period is included.





   



                 Component       Value           Ref Range       Performed At

 

   



                     Flow Class II Percent     0                   [facility]



                                         Positive   

 

   



                           Flow Class Report         [facility]



                                         Comments   













                                         Specimen

 





                                         Blood









   



                 Performing Organization     Address         City/[facility]     Phone Number

 

   



                     [facility]     [address]:  [id number],     [place] [address] 



                                         CLIA#[id number] CAP#[id number]  



                                         UNOS#[id number]  

 

   



                     [facility]     [address]:  [id number]     [place] [address] 





* FLOW PRA CLASS I WITH REFLEX TO ANTIBODY SPECIFICITY (2018  7:36 AM CDT)



Only the most recent of 4 results within the time period is included.





   



                 Component       Value           Ref Range       Performed At

 

   



                     Flow Class I Percent     12                  [facility]



                                         Positive   

 

   



                           Flow Class Report         [facility]



                                         Comments   













                                         Specimen

 





                                         Blood









   



                 Performing Organization     Address         City/[facility]     Phone Number

 

   



                     [facility]     [address]  [id number],     [place] [address] 



                                         CLIA#[id number] CAP#[id number]  



                                         UNOS#[id number]  

 

   



                     [facility]     [address]:  [id number]     [place] [address] 





* AB SPECIFICITY CLASS I (2018  7:36 AM CDT)



Only the most recent of 4 results within the time period is included.





   



                 Component       Value           Ref Range       Performed At

 

   



                     AB Specificity Class I     NO CLASS I ANTIBODY DETECTED      [facility]



                                         WITH MFIs > 4000  

 

   



                           AB Specificity Titr Class       [facility]



                                         Report   













                                         Specimen

 





                                         Blood









   



                 Performing Organization     Address         City/[facility]     Phone Number

 

   



                     [facility]     [address]  [id number],     [place] [address] 



                                         CLIA#[id number] CAP#[id number]  



                                         UNOS#[id number]  

 

   



                     [facility]     [address]:  [id number]     [place] [address] 





* PSA (2018  2:33 PM CST)





   



                 Component       Value           Ref Range       Performed At

 

   



                 PSA             0.6             0.0 - 4.0 ng/mL     [facility]













                                         Specimen

 





                                         Blood









   



                 Performing Organization     Address         City/[facility]     Phone Number

 

   



                 [facility]     [address] [place] [address]     [phone]





                                         MEDICAL CENTER   





* NM myocardial perfusion PET (rest and stress) (2018  2:12 PM CST)





 



                           Narrative                 Performed At

 

 



                           FINAL REPORT              Million-2-1



                                         PATIENT ID: [id number] 



                                         PROCEDURE: Rest/Stress MYOCARDIAL PERFUSION PET with 



                                         regadenoson\XA9\ 



                                         CPT CODE: 55855 



                                         INDICATION: Define extent, severity of known CAD, cardiovascular 



                                         evaluation prior to renal transplantation 



                                         HISTORY: Cardiac risk factors: ESRD, diabetes, hypertension, 



                                         obesity, peripheral vascular disease. Other cardiovascular history: 



                                         CAD with prior PCI. Recent cardiac symptoms: None. Current 



                                         cardiovascular-related medications: Aspirin, clopidogrel, losartan. 



                                         PROTOCOL: Limited low-dose CT imaging was performed for 



                                         attenuation correction. 40.1 mCi of Rb-82 chloride was injected iv at 



                                         rest, and gated PET (positron emission tomography) images were 



                                         obtained. Subsequently, 40.1 mCi of Rb-82 chloride was injected iv at 



                                         expected peak pharmacologic effect, and gated PET images were 



                                         obtained. 



                                         PRELIMINARY STRESS TEST DATA FROM NONINVASIVE CARDIOLOGY: 



                                         Pharmacologic stress was by 10-second iv infusion of 0.4 mg of 



                                         regadenoson. Radiotracer was injected 30 seconds after start of 



                                         stress. Heart rate was 64 beats/min at rest and 75 beats/min (47% of 



                                         MPHR) at tracer injection. BP was 132/88 mmHg at rest and 145/89 mmHg 



                                         at tracer injection. Stress was stopped for predetermined endpoint. 



                                         The patient experienced no symptoms; treatment was not required. 



                                         Preliminary ECG evaluation revealed sinus rhythm at rest and no 



                                         ischemic changes with stress. (Final ECG interpretation and other 



                                         stress and monitoring data are reported separately by Cardiology.) 



                                         IMAGING FINDINGS: Study quality is good. Images obtained after 



                                         rest and stress injections show normal LV activity. LV and RV volumes 



                                         appear normal. Gated images obtained at rest and with stress show 



                                         normal LV wall motion and thickening. LVEF at rest is 61%. LVEF at 



                                         stress is 67%. 



                                         IMPRESSION: 1. Normal study.2. Appropriate pharmacologic 



                                         stress.3. Normal myocardial perfusion.4. Normal resting LV 



                                         function. No deterioration of function is noted with pharmacologic 



                                         stress.5. Normal extracardiac tracer distribution.6. Compared to 



                                         previous [facility] study report on 10/13/2015, the previously mentioned 



                                         anterolateral defect is no longer seen. 



                                         NONINVASIVE RISK STRATIFICATION: 



                                         The above findings are considered low risk (<1% annual mortality 



                                         rate) based on the following criterion: 



                                         - Normal or small myocardial perfusion defect at rest or with stress 



                                         (JACC. 2012;59(9):857-81.) 



                                         Signed: Jameson Snell MD 



                                         Report Verified Date/Time:2018 16:23:27 



                                         Reading Location: [address] Reading Room 



                                          Electronically signed by: JAMESON SNELL M.D. on 2018 04:23 PM

 









                                        Procedure Note

 

                                        



Interface, External Ris In - 2018  4:25 PM CST



FINAL REPORT

 

PATIENT ID:   [id number]

 

PROCEDURE:     Rest/Stress MYOCARDIAL PERFUSION PET with 

regadenoson\XA9\

 

CPT CODE:     65644

 

INDICATION:     Define extent, severity of known CAD, cardiovascular 

evaluation prior to renal transplantation

 

HISTORY:     Cardiac risk factors: ESRD, diabetes, hypertension, 

obesity, peripheral vascular disease. Other cardiovascular history: 

CAD with prior PCI. Recent cardiac symptoms: None. Current 

cardiovascular-related medications: Aspirin, clopidogrel, losartan.

 

PROTOCOL:     Limited low-dose CT imaging was performed for 

attenuation correction. 40.1 mCi of Rb-82 chloride was injected iv at 

rest, and gated PET (positron emission tomography) images were 

obtained. Subsequently, 40.1 mCi of Rb-82 chloride was injected iv at 

expected peak pharmacologic effect, and gated PET images were 

obtained. 

 

PRELIMINARY STRESS TEST DATA FROM NONINVASIVE CARDIOLOGY:     

Pharmacologic stress was by 10-second iv infusion of 0.4 mg of 

regadenoson. Radiotracer was injected 30 seconds after start of 

stress. Heart rate was 64 beats/min at rest and 75 beats/min (47% of 

MPHR) at tracer injection. BP was 132/88 mmHg at rest and 145/89 mmHg 

at tracer injection. Stress was stopped for predetermined endpoint. 

The patient experienced no symptoms; treatment was not required. 

Preliminary ECG evaluation revealed sinus rhythm at rest and no 

ischemic changes with stress. (Final ECG interpretation and other 

stress and monitoring data are reported separately by Cardiology.) 

 

IMAGING FINDINGS:     Study quality is good. Images obtained after 

rest and stress injections show normal LV activity. LV and RV volumes 

appear normal. Gated images obtained at rest and with stress show 

normal LV wall motion and thickening. LVEF at rest is 61%. LVEF at 

stress is 67%.

 

IMPRESSION:     1. Normal study.  2. Appropriate pharmacologic 

stress.  3. Normal myocardial perfusion.  4. Normal resting LV 

function. No deterioration of function is noted with pharmacologic 

stress.  5. Normal extracardiac tracer distribution.  6. Compared to 

previous [facility] study report on 10/13/2015, the previously mentioned 

anterolateral defect is no longer seen.  

 

NONINVASIVE RISK STRATIFICATION: 

The above findings are considered low risk (<1% annual mortality 

rate) based on the following criterion:

                                        - Normal or small myocardial perfusion defect at rest or with stress

                                        (JACC. 2012;59(9):857-81.)

 

Signed: Jameson Snell MD

Report Verified Date/Time:  2018 16:23:27

 

Reading Location: [address] Reading Room

   Electronically signed by: JAMESON SNELL M.D. on 2018 04:23 PM

 









   



                 Performing Organization     Address         City/State/Zipcode     Phone Number

 

   



                                         [facility]   





* Treadmill tolerance(Non-Nuclear Treadmill) (2018  1:55 PM CST)





 



                           Narrative                 Performed At

 

 



                           Protocol Name Regodalisoson     [facility]



                                         Time In Exercise Phase 00:01:00 



                                         Max. Systolic  mmHg 



                                         Max Diastolic BP 89 mmHg 



                                         Max Heart Rate 75 BPM 



                                         Max Predicted Heart Rate 158 BPM 



                                         Reason For Termination Predetermined end point 



                                         Reason for Test Renal Transplant Work Up/Evaluation 



                                         Target HR Formula (220 - Age)*100% 



                                         Arrhythmias none 



                                         Resting ECG Normal sinus rhythm 



                                         ST Changes No Significant Changes 



                                         Overall Impression Indeterminate due to pharmacological stress 



                                         Chest Pain none 



                                         HR Response To Exercise 



                                         BP Response To Exercise 



                                         ASA 



                                         plavix 



                                         NOVOLOG 



                                         STARLIX 



                                         DIOVAN 



                                         TRENTAL 



                                         OMEGA-3 



                                         Confirmed by fellow Conner Busby ([id number]) on 2018 2:42:29 PM 



                                         Confirmed by MD MEKA, DEEJAY ([id number]) on 2018 3:34:20 PM 









                                        Procedure Note

 

                                        



Interface, External Ris In - 2018  3:34 PM CST



Protocol Name Regadenoson 

Time In Exercise Phase 00:01:00 

Max. Systolic  mmHg

Max Diastolic BP 89 mmHg

Max Heart Rate 75 BPM

Max Predicted Heart Rate 158 BPM

Reason For Termination Predetermined end point 

Reason for Test Renal Transplant Work Up/Evaluation 

Target HR Formula (220 - Age)*100% 

Arrhythmias none 

Resting ECG Normal sinus rhythm 

ST Changes No Significant Changes 

Overall Impression Indeterminate due to pharmacological stress 

Chest Pain none 

HR Response To Exercise  

BP Response To Exercise  



ASA

plavix

NOVOLOG

STARLIX

DIOVAN

TRENTAL

OMEGA-3



Confirmed by fellow Conner Busby ([id number]) on 2018 2:42:29 PM

Confirmed by MD MEKA, DEEJAY ([id number]) on 2018 3:34:20 PM









   



                 Performing Organization     Address         City/State/Zipcode     Phone Number

 

   



                                         [facility]   





after 2018



Insurance







     



            Payer      Benefit     Subscriber ID     Type       Phone      Address



                                         Plan /    



                                         Group    

 

     



                 MEDICARE        MEDICARE A      xxxxxxxxxx      Medicare  



                                         B    

 

     



                 MCR SUPPLEMENT/INDIVIDUAL     GENERIC         xxxxxxxxxx      [employer]  



                                         MEDICARE    



                                         SUPPLEMENT    









     



            Guarantor Name     Account     Relation to     Date of     Phone      Billing Address



                     Type                Patient             Birth  

 

     



            Alexis Alvarez     Personal/F     Self       1955     [phone] [address]               (Home)              [place] [address]







Advance Directives





For more information, please contact:



[facility]



[address] [place] [address] [phone]









                          Date Inactivated          Comments



                           Code Status               Date Activated  

 

                          2016  9:49 PM          



                           Full Code                 2016  7:48 AM  









  



                           This code status was determined by:     Patient 









                                                     

 

                          2015  2:43 PM        



                           Full Code                 12/10/2015  2:51 PM  









  



                           This code status was determined by:     Patient 









                                                     

 

                          12/10/2015  2:51 PM        



                           Full Code                 12/10/2015  7:28 AM  









  



                           This code status was determined by:     Patient

## 2019-01-03 NOTE — NUR
received pt sitting in chair with no c/o pain or discomfort, resp even and unlabored, able 
to verbalize needs, call light in reach

## 2019-01-04 VITALS — SYSTOLIC BLOOD PRESSURE: 160 MMHG | DIASTOLIC BLOOD PRESSURE: 71 MMHG

## 2019-01-04 VITALS — DIASTOLIC BLOOD PRESSURE: 66 MMHG | SYSTOLIC BLOOD PRESSURE: 156 MMHG

## 2019-01-04 VITALS — DIASTOLIC BLOOD PRESSURE: 63 MMHG | SYSTOLIC BLOOD PRESSURE: 148 MMHG

## 2019-01-04 VITALS — SYSTOLIC BLOOD PRESSURE: 156 MMHG | DIASTOLIC BLOOD PRESSURE: 66 MMHG

## 2019-01-04 VITALS — DIASTOLIC BLOOD PRESSURE: 85 MMHG | SYSTOLIC BLOOD PRESSURE: 184 MMHG

## 2019-01-04 VITALS — DIASTOLIC BLOOD PRESSURE: 72 MMHG | SYSTOLIC BLOOD PRESSURE: 130 MMHG

## 2019-01-04 VITALS — SYSTOLIC BLOOD PRESSURE: 143 MMHG | DIASTOLIC BLOOD PRESSURE: 69 MMHG

## 2019-01-04 VITALS — DIASTOLIC BLOOD PRESSURE: 84 MMHG | SYSTOLIC BLOOD PRESSURE: 182 MMHG

## 2019-01-04 LAB
ALBUMIN SERPL-MCNC: 3 G/DL (ref 3.5–5)
ALBUMIN/GLOB SERPL: 0.7 {RATIO} (ref 0.8–2)
ALP SERPL-CCNC: 53 IU/L (ref 40–150)
ALT SERPL-CCNC: 10 IU/L (ref 0–55)
ANION GAP SERPL CALC-SCNC: 18.7 MMOL/L (ref 8–16)
BASOPHILS # BLD AUTO: 0.1 10*3/UL (ref 0–0.1)
BASOPHILS NFR BLD AUTO: 0.8 % (ref 0–1)
BUN SERPL-MCNC: 44 MG/DL (ref 7–26)
BUN/CREAT SERPL: 5 (ref 6–25)
CALCIUM SERPL-MCNC: 9.1 MG/DL (ref 8.4–10.2)
CHLORIDE SERPL-SCNC: 95 MMOL/L (ref 98–107)
CO2 SERPL-SCNC: 25 MMOL/L (ref 22–29)
DEPRECATED NEUTROPHILS # BLD AUTO: 4.7 10*3/UL (ref 2.1–6.9)
EGFRCR SERPLBLD CKD-EPI 2021: 7 ML/MIN (ref 60–?)
EOSINOPHIL # BLD AUTO: 0.5 10*3/UL (ref 0–0.4)
EOSINOPHIL NFR BLD AUTO: 5.7 % (ref 0–6)
ERYTHROCYTE [DISTWIDTH] IN CORD BLOOD: 14.4 % (ref 11.7–14.4)
GLOBULIN PLAS-MCNC: 4.3 G/DL (ref 2.3–3.5)
GLUCOSE SERPLBLD-MCNC: 99 MG/DL (ref 74–118)
HCT VFR BLD AUTO: 34.7 % (ref 38.2–49.6)
HGB BLD-MCNC: 10.7 G/DL (ref 14–18)
LYMPHOCYTES # BLD: 1.9 10*3/UL (ref 1–3.2)
LYMPHOCYTES NFR BLD AUTO: 24 % (ref 18–39.1)
MCH RBC QN AUTO: 29.6 PG (ref 28–32)
MCHC RBC AUTO-ENTMCNC: 30.8 G/DL (ref 31–35)
MCV RBC AUTO: 95.9 FL (ref 81–99)
MONOCYTES # BLD AUTO: 0.8 10*3/UL (ref 0.2–0.8)
MONOCYTES NFR BLD AUTO: 10.3 % (ref 4.4–11.3)
NEUTS SEG NFR BLD AUTO: 58.8 % (ref 38.7–80)
PLATELET # BLD AUTO: 314 X10E3/UL (ref 140–360)
POTASSIUM SERPL-SCNC: 3.7 MMOL/L (ref 3.5–5.1)
RBC # BLD AUTO: 3.62 X10E6/UL (ref 4.3–5.7)
SODIUM SERPL-SCNC: 135 MMOL/L (ref 136–145)

## 2019-01-04 PROCEDURE — 5A1D70Z PERFORMANCE OF URINARY FILTRATION, INTERMITTENT, LESS THAN 6 HOURS PER DAY: ICD-10-PCS

## 2019-01-04 RX ADMIN — TAZOBACTAM SODIUM AND PIPERACILLIN SODIUM SCH MLS/HR: 250; 2 INJECTION, SOLUTION INTRAVENOUS at 12:40

## 2019-01-04 RX ADMIN — PENTOXIFYLLINE SCH MG: 400 TABLET, FILM COATED, EXTENDED RELEASE ORAL at 21:01

## 2019-01-04 RX ADMIN — PENTOXIFYLLINE SCH MG: 400 TABLET, FILM COATED, EXTENDED RELEASE ORAL at 21:00

## 2019-01-04 RX ADMIN — INSULIN LISPRO SCH UNIT: 100 INJECTION, SOLUTION INTRAVENOUS; SUBCUTANEOUS at 16:30

## 2019-01-04 RX ADMIN — SEVELAMER CARBONATE SCH MG: 800 TABLET, FILM COATED ORAL at 16:25

## 2019-01-04 RX ADMIN — INSULIN LISPRO SCH UNIT: 100 INJECTION, SOLUTION INTRAVENOUS; SUBCUTANEOUS at 11:30

## 2019-01-04 RX ADMIN — INSULIN LISPRO SCH UNIT: 100 INJECTION, SOLUTION INTRAVENOUS; SUBCUTANEOUS at 16:24

## 2019-01-04 RX ADMIN — SEVELAMER CARBONATE SCH MG: 800 TABLET, FILM COATED ORAL at 21:01

## 2019-01-04 RX ADMIN — LOPERAMIDE HYDROCHLORIDE PRN MG: 2 CAPSULE ORAL at 22:35

## 2019-01-04 RX ADMIN — INSULIN LISPRO SCH UNIT: 100 INJECTION, SOLUTION INTRAVENOUS; SUBCUTANEOUS at 07:30

## 2019-01-04 RX ADMIN — PENTOXIFYLLINE SCH MG: 400 TABLET, FILM COATED, EXTENDED RELEASE ORAL at 16:23

## 2019-01-04 RX ADMIN — NATEGLINIDE SCH MG: 120 TABLET ORAL at 16:23

## 2019-01-04 RX ADMIN — NATEGLINIDE SCH MG: 120 TABLET ORAL at 21:01

## 2019-01-04 RX ADMIN — INSULIN LISPRO SCH UNIT: 100 INJECTION, SOLUTION INTRAVENOUS; SUBCUTANEOUS at 21:42

## 2019-01-04 RX ADMIN — HEPARIN SODIUM SCH UNIT: 5000 INJECTION INTRAVENOUS; SUBCUTANEOUS at 21:41

## 2019-01-04 RX ADMIN — NATEGLINIDE SCH MG: 120 TABLET ORAL at 21:00

## 2019-01-04 RX ADMIN — SEVELAMER CARBONATE SCH MG: 800 TABLET, FILM COATED ORAL at 12:41

## 2019-01-04 RX ADMIN — TAZOBACTAM SODIUM AND PIPERACILLIN SODIUM SCH MLS/HR: 250; 2 INJECTION, SOLUTION INTRAVENOUS at 00:13

## 2019-01-04 NOTE — NUR
Patient visited in room during nursing rounds. Patient alert and oriented x3. Family at 
bedside visiting. Currently receiving hemodialysis via left upper arm fistula. Dialysis 
nurse in room monitoring patient. Gangrenous left 2nd toe covered with gauze dressing 
appearing clean and dry. Right heel ulcer wound covered with clean gauze and kerlix. Call 
bell within reach. Will monitor closely.

## 2019-01-04 NOTE — CONSULTATION
DATE OF CONSULTATION:  January 04, 2019 



Mr. Alexis Alvarez is known to me.  A 63-year-old  gentleman with 

underlying history of type 2 diabetes with end-organ damage, including 

diabetic kidney disease, on dialysis, history of diabetic retinopathy, 

neuropathy, peripheral vascular disease.  He has had chronic toe infection 

and diabetic foot, which Dr. Nithin Barraza has been addressing.  Presented 

again with another toe issue.  Foot is currently being dressed, and 

schedule for surgery next week.  He denies fever, chills, chest pain, 

shortness of breath.  Has a white count of 7.9 and hemoglobin 10.7.  

Potassium 3.7, bicarbonate 25, creatinine 8.  Other than that, he denies.  



ALLERGIES:  THERE ARE NOT REPORTED DRUG ALLERGIES.  



CURRENT MEDICATIONS:  Patient is on apparently IV fluids which I am going 

to stop.  He received 1 dose of vancomycin and on Zosyn 2.25 q.12 h., 

ondansetron p.r.n. and Zofran p.r.n. 



SOCIAL HISTORY:  Does not smoke or drink.



FAMILY HISTORY:  Significant for diabetes.

 

PHYSICAL EXAMINATION

GENERAL:  Awake, alert and laying supine.  No apparent distress. 

VITALS:  Blood pressure of 130/60, pulse rate 80. 

HEAD AND NECK:  Cornea clear.  Mucosa moist.  Neck veins flat. 

LUNGS:  Relatively clear. 

HEART:  S1 and S2 audible. 

ABDOMEN:  Otherwise soft and nontender. 

LOWER EXTREMITY EXAMINATION:  Shows no edema. 



IMPRESSION AND PLAN:  End-stage renal disease.  Plan on hemodialysis.  

Start phosphorus binders.  Erythropoietin.  Appropriate diet.  Fluid 

restriction.  Protein supplementation.  Discussed with the patient.  

Consider infectious disease consultation.  



 





DD:  01/04/2019 12:10

DT:  01/04/2019 15:05

Job#:  [id number]

## 2019-01-04 NOTE — DIAGNOSTIC IMAGING REPORT
Exam: Left foot series; 3 views dated 1/4/2019 at 7:47 AM



History: Gangrene of the left second digit



Comparison: None available



Findings: Soft tissue ulcer and wound of the distal second digit is noted.

There is also an erosion of the distal phalanx of the second digit. Findings

compatible with osteomyelitis. Diffuse vascular calcification is also present.





Impression:



Soft tissue ulcer and bony erosion of the distal phalanx of the second digit.



Signed by: Dr. Sukhdev Toro DO on 1/4/2019 8:11 AM

## 2019-01-04 NOTE — NUR
[id number] meds pt refused since not eating and doing dialysis, per pt will take once 
finished. insulin refused also.

## 2019-01-04 NOTE — HISTORY AND PHYSICAL
CHIEF COMPLAINT:  Concerns for underlying osteomyelitis of the foot, 

diabetic foot ulcer.



HPI:  This is a 63-year-old  male who is known to podiatry, Dr. Barraza, who was sent in by his office due to worsening changes in the 2nd toe 

of the left foot.  According to the records, the patient has had 

discoloration and gangrenous changes in the 2nd left toe for the last 

several weeks now.  This ulceration was debrided down to the bone several 

times in the past.  He denies any fevers, chills, or any nausea or 

vomiting.  The distal phalanx has ulceration.  Denies any chest pain, 

palpitations, nausea, or vomiting.  The patient was seen and evaluated at 

the bedside on the medical floor.  Currently, he is doing well with no 

other complaints at this time. 



REVIEW OF SYSTEMS:  Pertinent positive is left diabetic foot ulcer.  

Pertinent negatives are denies any chest pain, palpitations, nausea, 

vomiting, diarrhea, dysuria, hematuria, frequency, urgency, 

lightheadedness, syncope, abdominal pain, shortness of breath, 

palpitations, cough, fever, or _______.



The rest of the 14-point review of systems have been reviewed with the 

patient and are negative. 



ALLERGIES:  NO KNOWN DRUG ALLERGIES.



HOME MEDICATIONS

1.  Aspirin 325 mg daily.

2.  Fenofibrate 48 mg daily.

3.  Iron tablets 325 mg daily.

4.  Gabapentin 300 mg daily. 

5.  He takes insulin and Lantus 4 units at bedtime.

6.  Benicar 20 mg daily.  

7.  Fish oil.

8.  Protonix 40 mg daily.

9.  _______ 300 mg p.o. t.i.d. 



PAST MEDICAL HISTORY:  Type 2 diabetes, hypertension, morbid obesity, 

multiple diabetic foot ulcerations.



PAST SURGICAL HISTORY:  Multiple debridements of diabetic foot ulcers in 

the past.



FAMILY HISTORY:  Hypertension and diabetes mellitus.



SOCIAL HISTORY:  No drugs.  No alcohol.  No tobacco.



PHYSICAL EXAMINATION 

VITAL SIGNS:  Temperature is 96.4, pulse is 70, respiratory rate is 20, 

blood pressure is 140/68, and pulse ox 93% on room air.  

GENERAL:  Not in acute distress.  Alert and oriented times 3.  Cooperative 

on examination. 

HEENT:  Head is normocephalic and atraumatic.  Eyes:  Pupils equal, round 

and reactive to light bilaterally.  Extraocular movements intact 

bilaterally.

NECK:  Supple.  Good range of motion.  Throat with no evidence of any 

erythema or exudates in the posterior pharynx.  Has poor dentition. 

PULMONARY:  Clear to auscultation bilaterally.  No wheezing.  No rales. No 

rhonchi.  No crackles appreciated. 

CARDIOVASCULAR:  Positive S1 and S2.  No murmurs, rubs or gallops 

appreciated.

ABDOMEN:  Soft, nondistended and nontender to palpation.  Bowel sounds 

present. 

MUSCULOSKELETAL:  Strength is 5/5 throughout.  No evidence of any muscle 

deficit on examination.  No weakness appreciated.

NEUROLOGICAL:  Cranial nerves II-XII are grossly intact.  No evidence of 

any neurological deficits on exam.  

SKIN:  Intact.  Warm to touch.  Good cap refill.

PSYCHIATRIC:  Normal affect and mood.

EXTREMITIES:  No edema.  Good range of motion throughout. 



LAB FINDINGS:  Shows a white count of 7.9, hemoglobin 10.7, hematocrit 35, 

and platelets of 314,000.  Coagulations were normal.  Chemistry:  Sodium 

135, potassium 3.7, chloride 95, bicarb 75, anion gap of 18, BUN is 44, 

creatinine 0.07.  His albumin was 3.  His LFTs were normal.  



MICROBIOLOGY:  Blood cultures are pending.  



IMAGING STUDIES:  Foot x-ray showed soft tissue ulcer with bony erosion of 

the distal phalanx to the 2nd toe.  



IMPRESSION 

1.  Left foot diabetic foot ulcer.  

2.  Type 2 diabetes.

3.  Hypertension. 

4.  Morbid obesity.

5.  Medical noncompliance. 



PLAN:  At this time, continue with IV antibiotics.  ID and podiatry were 

consulted.  May need imaging studies further to evaluate.  Nephrology 

consulted for HD treatment.  Continue with Renvela for phos binders.  

Continue antihypertensive medications.  Put him on heparin 5000 units 

subcutaneous b.i.d. for DVT prophylaxis.  











DD:  01/04/2019 12:33

DT:  01/04/2019 13:49

Job#:  [id number]

## 2019-01-04 NOTE — NUR
Dr. Adam called via phone to inform patient was having several diarrheal episodes and that 
patient is adamant he needs anti-diarrheal medication. Dr. Adam informed he does not want to 
be called for such request especially for diarrhea episodes. MD specifically stated "I don't 
want to be called about this crap". But MD still ordered Loperamide 2mg PO q6hr prn for 
loose stools.

## 2019-01-04 NOTE — NUR
dr deras rounded, gave orders for wound care. stated will continue to have pt on iv abx with 
wound care and possible procedure next week (wed). contacted [facility] to inquire about pt 
having dialysis today. spoke with Yesika, stated she would ensure pt on list for today. will 
continue to monitor and f/u

## 2019-01-04 NOTE — NUR
to bedside to discuss plan of care with patient/family. CM/SW role and care 
transitions discussed. Anticipated discharge plan discussed along with duration of care. 
CM/SW discussed patients right to make decisions in care.  CM/SW work hours given.  

Patient lives: with sister, brother, and mom

Admit/Transfer: thru ED, from home

POA/Emergency contact: son Alexis Alvarez Jr. [phone], daughter Seda Alvarez 
[phone]

Current/Previous Home Health: [facility] home [facility] 3x/week for wound care; would like to 
resume with their services. Choice letter signed and filed in chart. Copy to pt.

PCP/Follow-up Care: PCP Dr. Chauncey Villagomez, Podiatry Dr. Nithin Barraza

Current/Previous DME: uses rollator, has cane also

Other Services: HD at [facility] on MWF @ 1230

Employment Status: disabled

Areas of Concerns: foot wounds

Referral Needs: wound care

Education Needs: wound management

IMM/MOON given and signed (if applicable): none at this time

Goal for discharge: home; family will provide transportation

CM/SW left business card at the bedside with contact information. Name and number was also 
written on the patients whiteboard. Patient verbalized understanding of discussion. CM will 
follow-up with ongoing discharge and transition of care needs.

## 2019-01-04 NOTE — NUR
Attempted to call Dr. Barraza to possibly obtain wound care orders. Spoke to answering service 
and was informed will relay message to Dr. Barraza. Awaiting on MD call back. Patient agreed to 
wait either for phone call or till Dr. Barraza to see patient during rounds in AM (1/5/19).

## 2019-01-04 NOTE — CONSULTATION
DATE OF CONSULTATION:  January 04, 2019 



REASON FOR CONSULTATION:  Gangrenous changes to the 2nd toe, left foot with 

a grade 3 ulceration, right heel with patient being a diabetic.



HISTORY OF PRESENT ILLNESS:  A pleasant 63-year-old  male who is 

very well known to me from previous admissions and following up in the 

office, who presented several weeks ago with discoloration and gangrenous 

changes noted to the 2nd toe, left foot.  The ulceration was debrided down 

to bone several times.  Patient did not get any better.  Started having 

foul smell, but is denying any history of fever, chills, nausea, or 

vomiting.  The distal phalanx is exposed to the ulceration.



ALLERGIES:  PATIENT DENIES.



PAST MEDICAL HISTORY:  Remarkable for insulin-dependent diabetes, end-stage 

renal disease since 2011, hypertension, hypercholesterolemia and with 

congestive heart failure.



PAST SURGICAL HISTORY:  Remarkable for a heart stent, balloon angioplasties 

to both lower extremities.



SOCIAL HISTORY:  Denies any smoking, drinking or recreational drug use.  

Lives with sister.  Has 3 children.



FAMILY HISTORY:  Remarkable for diabetes.



CURRENT MEDICATIONS:  Noted and listed in chart, including IV Zosyn.



REVIEW OF SYSTEMS

CARDIAC:  Denies any palpitations or arrhythmias. 

RESPIRATORY:  Denies any shortness of breath or productive cough. 

GASTROINTESTINAL:  Denies any diarrhea or constipation. 



PHYSICAL EXAMINATION  

VITALS:  Afebrile, pulse rate 51, respirations 18, blood pressure 182/84, 

O2 saturation 97%.

PODIATRIC PHYSICAL EXAMINATION  Reveals the following:  

VASCULATURE:  Pedal pulses to both the DP and PT are diminished to both 

lower extremities. CFT to all toes less than 4 to 5 seconds. 

NEUROLOGICAL:  Reveals a complete loss of protective sensation when 

utilizing [place] 5.07 monofilament wire. 

MUSCULOSKELETAL:  Reveals muscle mass to be symmetrical and muscle strength 

to be 4/5 to all muscle groups. 

DERMATOLOGICAL:  Reveals a grade 2/3 ulceration, right heel, more than 5 cm 

in diameter.  No bone or tendon exposed.  Granulation tissue noted.  Has a 

grade 4 ulcer with bone exposed to the 2nd toe, left foot.  Ulcer is 

approximately 2 cm in diameter. 



LABS:  Noted.  Has a white blood cell count of 7.96, hemoglobin 10.7, 

hematocrit 34.7 with a platelet count of 314,000.



ASSESSMENT

1.  Osteomyelitis, pregangrenous changes noted to the 2nd toe, left foot.  

2.  Peripheral artery disease.

3.  Diabetic neuropathy with a grade 2/3 ulceration, right heel.





PLAN:  X-rays 3 views of the left foot were ordered.  Will start Santyl 

collagenase followed by diluted wet-to-dry Betadine to both lower 

extremities.  Will continue to let the foot demarcate.  Surgical 

intervention will be done some time next week.  Level of amputation to be 

determined.  



 





DD:  01/04/2019 07:24

DT:  01/04/2019 07:35

Job#:  [id number]

## 2019-01-05 VITALS — DIASTOLIC BLOOD PRESSURE: 60 MMHG | SYSTOLIC BLOOD PRESSURE: 132 MMHG

## 2019-01-05 VITALS — SYSTOLIC BLOOD PRESSURE: 128 MMHG | DIASTOLIC BLOOD PRESSURE: 61 MMHG

## 2019-01-05 VITALS — SYSTOLIC BLOOD PRESSURE: 135 MMHG | DIASTOLIC BLOOD PRESSURE: 61 MMHG

## 2019-01-05 VITALS — SYSTOLIC BLOOD PRESSURE: 129 MMHG | DIASTOLIC BLOOD PRESSURE: 70 MMHG

## 2019-01-05 VITALS — SYSTOLIC BLOOD PRESSURE: 138 MMHG | DIASTOLIC BLOOD PRESSURE: 75 MMHG

## 2019-01-05 VITALS — DIASTOLIC BLOOD PRESSURE: 69 MMHG | SYSTOLIC BLOOD PRESSURE: 137 MMHG

## 2019-01-05 LAB
BASOPHILS # BLD AUTO: 0.1 10*3/UL (ref 0–0.1)
BASOPHILS NFR BLD AUTO: 0.8 % (ref 0–1)
DEPRECATED NEUTROPHILS # BLD AUTO: 5 10*3/UL (ref 2.1–6.9)
EOSINOPHIL # BLD AUTO: 0.5 10*3/UL (ref 0–0.4)
EOSINOPHIL NFR BLD AUTO: 5.8 % (ref 0–6)
ERYTHROCYTE [DISTWIDTH] IN CORD BLOOD: 14.5 % (ref 11.7–14.4)
HCT VFR BLD AUTO: 35.4 % (ref 38.2–49.6)
HGB BLD-MCNC: 11.1 G/DL (ref 14–18)
LYMPHOCYTES # BLD: 1.6 10*3/UL (ref 1–3.2)
LYMPHOCYTES NFR BLD AUTO: 20.4 % (ref 18–39.1)
MCH RBC QN AUTO: 30 PG (ref 28–32)
MCHC RBC AUTO-ENTMCNC: 31.4 G/DL (ref 31–35)
MCV RBC AUTO: 95.7 FL (ref 81–99)
MONOCYTES # BLD AUTO: 0.7 10*3/UL (ref 0.2–0.8)
MONOCYTES NFR BLD AUTO: 9.4 % (ref 4.4–11.3)
NEUTS SEG NFR BLD AUTO: 63.1 % (ref 38.7–80)
PLATELET # BLD AUTO: 299 X10E3/UL (ref 140–360)
RBC # BLD AUTO: 3.7 X10E6/UL (ref 4.3–5.7)

## 2019-01-05 RX ADMIN — TAZOBACTAM SODIUM AND PIPERACILLIN SODIUM SCH MLS/HR: 250; 2 INJECTION, SOLUTION INTRAVENOUS at 00:17

## 2019-01-05 RX ADMIN — SEVELAMER CARBONATE SCH MG: 800 TABLET, FILM COATED ORAL at 09:12

## 2019-01-05 RX ADMIN — NATEGLINIDE SCH MG: 120 TABLET ORAL at 15:57

## 2019-01-05 RX ADMIN — INSULIN LISPRO SCH UNIT: 100 INJECTION, SOLUTION INTRAVENOUS; SUBCUTANEOUS at 11:55

## 2019-01-05 RX ADMIN — Medication SCH MG: at 09:13

## 2019-01-05 RX ADMIN — FENOFIBRATE SCH MG: 48 TABLET ORAL at 09:13

## 2019-01-05 RX ADMIN — PENTOXIFYLLINE SCH MG: 400 TABLET, FILM COATED, EXTENDED RELEASE ORAL at 09:13

## 2019-01-05 RX ADMIN — CEFEPIME HYDROCHLORIDE SCH MLS/HR: 1 INJECTION, POWDER, FOR SOLUTION INTRAMUSCULAR; INTRAVENOUS at 09:56

## 2019-01-05 RX ADMIN — INSULIN LISPRO SCH UNIT: 100 INJECTION, SOLUTION INTRAVENOUS; SUBCUTANEOUS at 16:30

## 2019-01-05 RX ADMIN — SEVELAMER CARBONATE SCH MG: 800 TABLET, FILM COATED ORAL at 12:39

## 2019-01-05 RX ADMIN — Medication SCH MG: at 09:12

## 2019-01-05 RX ADMIN — HEPARIN SODIUM SCH UNIT: 5000 INJECTION INTRAVENOUS; SUBCUTANEOUS at 21:44

## 2019-01-05 RX ADMIN — NATEGLINIDE SCH MG: 120 TABLET ORAL at 21:44

## 2019-01-05 RX ADMIN — PENTOXIFYLLINE SCH MG: 400 TABLET, FILM COATED, EXTENDED RELEASE ORAL at 21:44

## 2019-01-05 RX ADMIN — INSULIN LISPRO SCH UNIT: 100 INJECTION, SOLUTION INTRAVENOUS; SUBCUTANEOUS at 07:30

## 2019-01-05 RX ADMIN — OLMESARTAN MEDOXOMIL SCH MG: 20 TABLET, FILM COATED ORAL at 09:13

## 2019-01-05 RX ADMIN — PENTOXIFYLLINE SCH MG: 400 TABLET, FILM COATED, EXTENDED RELEASE ORAL at 15:57

## 2019-01-05 RX ADMIN — INSULIN LISPRO SCH UNIT: 100 INJECTION, SOLUTION INTRAVENOUS; SUBCUTANEOUS at 21:45

## 2019-01-05 RX ADMIN — SEVELAMER CARBONATE SCH MG: 800 TABLET, FILM COATED ORAL at 15:57

## 2019-01-05 RX ADMIN — PANTOPRAZOLE SODIUM SCH MG: 40 TABLET, DELAYED RELEASE ORAL at 09:13

## 2019-01-05 RX ADMIN — NATEGLINIDE SCH MG: 120 TABLET ORAL at 09:13

## 2019-01-05 RX ADMIN — HEPARIN SODIUM SCH UNIT: 5000 INJECTION INTRAVENOUS; SUBCUTANEOUS at 09:14

## 2019-01-05 NOTE — NUR
Patient visited in room during nursing rounds. Patient alert and oriented x3. Patient with 
left upper arm fistula. Gangrenous left 2nd toe covered with gauze and kerlix dressing 
appearing clean and dry. Right heel ulcer wound covered with clean gauze and kerlix. Call 
bell within reach. Will monitor closely.

## 2019-01-05 NOTE — CONSULTATION
DATE OF CONSULTATION:    



PATIENT OF:  Dr. Fernandez/Dr. Adam.



HISTORY OF PRESENT ILLNESS:  Mr. Alvarez is a 63-year-old gentleman with 

insulin-dependent diabetes with chronic wounds of the feet.  The left 

second toe apparently started getting worse.  He sees Dr. Barraza as an 

outpatient and which failed outpatient treatment.  Patient is admitted to 

[facility] for evaluation and medical care of his left 

necrotic toe.



PAST MEDICAL HISTORY:  Including diabetes, neuropathy, chronic pain, 

hyperlipidemia, GERD, and end-stage renal disease, on dialysis.



ALLERGIES:  NO KNOWN ALLERGIES.



LABORATORY STUDIES:  White count of 7.89, hemoglobin 11.1, platelet count 

of 299.  Creatinine is 0.7.



The patient is on dialysis.



RADIOLOGY STUDIES:  X-ray of the left foot done suggests soft tissue ulcer 

and body erosion of the distal phalanx of the 2nd digit.



REVIEW OF SYSTEMS:  No nausea, vomiting, fever, chills, chest pain, 

shortness of breath.  Pain is controlled.



PHYSICAL EXAM

GENERAL:  Alert and oriented, in bed.

VITAL SIGNS:  Temperature is 96.7, pulse is 65, blood pressure is 128/61, 

respirations are 20.

CVS:  S1, S2.

CHEST:  Equal expansion.  Clear to auscultation.  No acute distress.

ABDOMEN:  Soft, nontender.  No distention.  Bowel sounds positive in 4 

quadrants.

HEENT:  Moist. 

NECK:  No JVD. 

EXTREMITIES:  Right heel ulcer with some bleeding.  Left 2nd toe necrotic, 

malodorous.



ASSESSMENT AND PLAN:  This is a 63-year-old gentleman with end-stage renal 

disease on dialysis, diabetes insulin dependent, with progressive worsening 

of the wounds of his feet as mentioned above.  We will change the 

antibiotics to vancomycin a gram q.h.d. and cefepime gram a day.  May need 

further studies of vasculature evaluation for adequate blood flow.  This 

case was discussed with Dr. Becker in detail.



Thank you for this consult. 



DICTATED BY: IVETTE Santamaria







DD:  01/05/2019 08:51

DT:  01/05/2019 10:48

Job#:  [id number]

## 2019-01-05 NOTE — PROGRESS NOTE
DATE:  January 05, 2019 



SUBJECTIVE:  Patient seen at bedside.  Doing better.  Denies any history of 

fever, chills, nausea, or vomiting.



OBJECTIVE

VITAL SIGNS:  Afebrile, pulse rate 70, respirations 18, blood pressure 

129/70, and O2 saturation 98%.

EXTREMITIES:  Ulceration to the 2nd toe, left lower extremity shows some 

bone exposed, demarcating, some gangrenous changes noted down to the 

proximal interphalangeal joint.



LABS:  Show a white blood cell count of 7.8, hemoglobin 11.1, hematocrit 

35.4 with a platelet count of 299.



IMPRESSION AND PLAN:  We will let the foot demarcate for a couple days 

before any definitive surgery will be performed.  Patient understands 

amputation will need to be done, level to be determined.









DD:  01/05/2019 15:07

DT:  01/05/2019 15:16

Job#:  [id number] CARLOS

## 2019-01-05 NOTE — PROGRESS NOTE
DATE:  January 05, 2019 



MEDICINE PROGRESS NOTE



SUBJECTIVE:  Patient is doing well today with no complaints.  Awaiting for 

the consultants to give further recommendations.



VITAL SIGNS:  She is afebrile.  Normotensive.  Respiratory rate is good.



LABS:  CBC normal.  Chemistries within normal range except elevated 

creatinine.  Patient is on dialysis.



PHYSICAL EXAMINATION

GENERAL:  Not in acute distress.  Alert and oriented x3.  Cooperative on 

examination.

HEENT:  Head is normocephalic and atraumatic.  Eyes:  Pupils equal, round 

and reactive to light bilaterally.  Extraocular movements intact 

bilaterally.

NECK:  Supple with good range of motion.  Throat with no evidence of any 

erythema or exudates in the posterior pharynx.  Has poor dentition.

PULMONARY:  Clear to auscultation bilaterally.  No wheezing.  No rales.  No 

rhonchi.  No crackles appreciated.

CARDIOVASCULAR:  Positive S1 and S2.  No murmurs, rubs, or gallops 

appreciated.

ABDOMEN:  Soft, nondistended, nontender to palpation.  Bowel sounds 

present.

MUSCULOSKELETAL:  Strength is 5/5 throughout.  No evidence of any muscle 

deficit on examination.  No weakness appreciated.

NEUROLOGICAL:  Cranial nerves II through XII are grossly intact.  No 

evidence of any neurological deficits on exam.

SKIN:  Intact.  Warm to touch.  Good cap refill.

PSYCHIATRIC:  Normal affect and mood.

EXTREMITIES:  No edema.  Good range of motion throughout.



IMPRESSION

1. Left diabetic foot ulcer.

2. Type 2 diabetes.

3. Hypertension.

4. Morbid obesity.

5. End-stage renal disease, on hemodialysis.

6. Medical noncompliance.



PLAN:  Continue with IV antibiotic.  ID and podiatry are following 

accordingly.  He is on IV antibiotics.  Nephrology consulted for HD 

treatment.  Renvela for phos binders.  Heparin for DVT prophylaxis.  

Continue plan of care.  Dr. Fernandez will be available tomorrow to continue 

plan of care.









DD:  01/05/2019 13:42

DT:  01/05/2019 13:58

Job#:  [id number] HARSHA

## 2019-01-05 NOTE — NUR
Visit made by the Spiritual Care Department Pastoral Visitor, Janis Tirado. PV provided 
pastoral presence, prayer, hospitality, and supportive listening. 

Pastoral Visitor informed pt/family of the scope of Chaplaincy Services and availability.



PHONG RAJPUT



Spiritual Care Department

O: [phone]

Pager: [phone] ([phone] calling [phone])

## 2019-01-06 VITALS — SYSTOLIC BLOOD PRESSURE: 140 MMHG | DIASTOLIC BLOOD PRESSURE: 63 MMHG

## 2019-01-06 VITALS — SYSTOLIC BLOOD PRESSURE: 121 MMHG | DIASTOLIC BLOOD PRESSURE: 58 MMHG

## 2019-01-06 VITALS — DIASTOLIC BLOOD PRESSURE: 73 MMHG | SYSTOLIC BLOOD PRESSURE: 155 MMHG

## 2019-01-06 VITALS — SYSTOLIC BLOOD PRESSURE: 139 MMHG | DIASTOLIC BLOOD PRESSURE: 60 MMHG

## 2019-01-06 VITALS — DIASTOLIC BLOOD PRESSURE: 61 MMHG | SYSTOLIC BLOOD PRESSURE: 129 MMHG

## 2019-01-06 VITALS — SYSTOLIC BLOOD PRESSURE: 156 MMHG | DIASTOLIC BLOOD PRESSURE: 72 MMHG

## 2019-01-06 RX ADMIN — CEFEPIME HYDROCHLORIDE SCH MLS/HR: 1 INJECTION, POWDER, FOR SOLUTION INTRAMUSCULAR; INTRAVENOUS at 09:25

## 2019-01-06 RX ADMIN — NATEGLINIDE SCH MG: 120 TABLET ORAL at 21:01

## 2019-01-06 RX ADMIN — NATEGLINIDE SCH MG: 120 TABLET ORAL at 15:37

## 2019-01-06 RX ADMIN — OLMESARTAN MEDOXOMIL SCH MG: 20 TABLET, FILM COATED ORAL at 09:25

## 2019-01-06 RX ADMIN — PANTOPRAZOLE SODIUM SCH MG: 40 TABLET, DELAYED RELEASE ORAL at 09:26

## 2019-01-06 RX ADMIN — SEVELAMER CARBONATE SCH MG: 800 TABLET, FILM COATED ORAL at 16:54

## 2019-01-06 RX ADMIN — INSULIN LISPRO SCH UNIT: 100 INJECTION, SOLUTION INTRAVENOUS; SUBCUTANEOUS at 21:03

## 2019-01-06 RX ADMIN — PENTOXIFYLLINE SCH MG: 400 TABLET, FILM COATED, EXTENDED RELEASE ORAL at 15:37

## 2019-01-06 RX ADMIN — Medication SCH MG: at 09:25

## 2019-01-06 RX ADMIN — VANCOMYCIN HYDROCHLORIDE SCH MG: 250 CAPSULE ORAL at 13:24

## 2019-01-06 RX ADMIN — NATEGLINIDE SCH MG: 120 TABLET ORAL at 09:26

## 2019-01-06 RX ADMIN — HEPARIN SODIUM SCH UNIT: 5000 INJECTION INTRAVENOUS; SUBCUTANEOUS at 09:26

## 2019-01-06 RX ADMIN — PENTOXIFYLLINE SCH MG: 400 TABLET, FILM COATED, EXTENDED RELEASE ORAL at 09:26

## 2019-01-06 RX ADMIN — INSULIN LISPRO SCH UNIT: 100 INJECTION, SOLUTION INTRAVENOUS; SUBCUTANEOUS at 16:30

## 2019-01-06 RX ADMIN — FENOFIBRATE SCH MG: 48 TABLET ORAL at 09:26

## 2019-01-06 RX ADMIN — HEPARIN SODIUM SCH UNIT: 5000 INJECTION INTRAVENOUS; SUBCUTANEOUS at 21:02

## 2019-01-06 RX ADMIN — INSULIN LISPRO SCH UNIT: 100 INJECTION, SOLUTION INTRAVENOUS; SUBCUTANEOUS at 11:30

## 2019-01-06 RX ADMIN — SEVELAMER CARBONATE SCH MG: 800 TABLET, FILM COATED ORAL at 09:25

## 2019-01-06 RX ADMIN — INSULIN LISPRO SCH UNIT: 100 INJECTION, SOLUTION INTRAVENOUS; SUBCUTANEOUS at 07:30

## 2019-01-06 RX ADMIN — LOPERAMIDE HYDROCHLORIDE PRN MG: 2 CAPSULE ORAL at 09:26

## 2019-01-06 RX ADMIN — PENTOXIFYLLINE SCH MG: 400 TABLET, FILM COATED, EXTENDED RELEASE ORAL at 21:01

## 2019-01-06 RX ADMIN — SEVELAMER CARBONATE SCH MG: 800 TABLET, FILM COATED ORAL at 13:24

## 2019-01-06 RX ADMIN — VANCOMYCIN HYDROCHLORIDE SCH MG: 250 CAPSULE ORAL at 16:54

## 2019-01-06 NOTE — NUR
Patient visited in room during nursing rounds. Patient alert and oriented x3. Patient with 
left upper arm fistula. Gangrenous left 2nd toe covered with gauze and kerlix dressing 
appearing clean and dry. Right heel ulcer wound covered with clean gauze and kerlix. Patient 
aware stool sample needed to be collected for C. diff test. Patient adamant he still needs 
Imodium tonight. Last bowel movement was soft in texture. Call bell within reach. Will 
monitor closely.

## 2019-01-06 NOTE — PROGRESS NOTE
DATE:  January 06, 2019 



SUBJECTIVE:  Patient seen at bedside accompanied by daughter.  Doing well.  

Denies any history of fever, chills, nausea, or vomiting.



OBJECTIVE

VITAL SIGNS:  Afebrile, pulse rate 66, respirations 19, blood pressure 

139/60, and O2 saturation 98%.

EXTREMITIES:  Gangrenous changes to the 2nd toe left foot stabilizing.  

Bone exposed with a grade IV ulcer measuring 1.5 to 2 cm in diameter on the 

distal aspect of the 2nd toe, left foot.  He has a grade III ulcer of the 

right heel.  Pedal pulses diminished.  Skin temperature warm and cool to 

touch.



LABS:  Note.



ASSESSMENT:  Osteomyelitis with a grade IV ulcer left and grade III ulcer 

right.



PLAN:  Continue local wound care.  Continue IV antibiotics.  Continue 

offloading.  We will continue to let the foot demarcate before any 

definitive procedures done.









DD:  01/06/2019 11:29

DT:  01/06/2019 11:44

Job#:  [id number] [place]

## 2019-01-07 VITALS — SYSTOLIC BLOOD PRESSURE: 143 MMHG | DIASTOLIC BLOOD PRESSURE: 67 MMHG

## 2019-01-07 VITALS — DIASTOLIC BLOOD PRESSURE: 72 MMHG | SYSTOLIC BLOOD PRESSURE: 143 MMHG

## 2019-01-07 VITALS — SYSTOLIC BLOOD PRESSURE: 148 MMHG | DIASTOLIC BLOOD PRESSURE: 74 MMHG

## 2019-01-07 VITALS — DIASTOLIC BLOOD PRESSURE: 69 MMHG | SYSTOLIC BLOOD PRESSURE: 136 MMHG

## 2019-01-07 VITALS — DIASTOLIC BLOOD PRESSURE: 71 MMHG | SYSTOLIC BLOOD PRESSURE: 144 MMHG

## 2019-01-07 VITALS — DIASTOLIC BLOOD PRESSURE: 80 MMHG | SYSTOLIC BLOOD PRESSURE: 131 MMHG

## 2019-01-07 VITALS — DIASTOLIC BLOOD PRESSURE: 74 MMHG | SYSTOLIC BLOOD PRESSURE: 148 MMHG

## 2019-01-07 VITALS — SYSTOLIC BLOOD PRESSURE: 144 MMHG | DIASTOLIC BLOOD PRESSURE: 71 MMHG

## 2019-01-07 RX ADMIN — SEVELAMER CARBONATE SCH MG: 800 TABLET, FILM COATED ORAL at 08:58

## 2019-01-07 RX ADMIN — OLMESARTAN MEDOXOMIL SCH MG: 20 TABLET, FILM COATED ORAL at 08:58

## 2019-01-07 RX ADMIN — SEVELAMER CARBONATE SCH MG: 800 TABLET, FILM COATED ORAL at 12:37

## 2019-01-07 RX ADMIN — Medication SCH MG: at 08:58

## 2019-01-07 RX ADMIN — CEFEPIME HYDROCHLORIDE SCH MLS/HR: 1 INJECTION, POWDER, FOR SOLUTION INTRAMUSCULAR; INTRAVENOUS at 08:58

## 2019-01-07 RX ADMIN — INSULIN LISPRO SCH UNIT: 100 INJECTION, SOLUTION INTRAVENOUS; SUBCUTANEOUS at 21:00

## 2019-01-07 RX ADMIN — PENTOXIFYLLINE SCH MG: 400 TABLET, FILM COATED, EXTENDED RELEASE ORAL at 08:57

## 2019-01-07 RX ADMIN — NATEGLINIDE SCH MG: 120 TABLET ORAL at 08:57

## 2019-01-07 RX ADMIN — HEPARIN SODIUM SCH UNIT: 5000 INJECTION INTRAVENOUS; SUBCUTANEOUS at 21:00

## 2019-01-07 RX ADMIN — PANTOPRAZOLE SODIUM SCH MG: 40 TABLET, DELAYED RELEASE ORAL at 08:58

## 2019-01-07 RX ADMIN — VANCOMYCIN HYDROCHLORIDE SCH MG: 250 CAPSULE ORAL at 00:00

## 2019-01-07 RX ADMIN — NATEGLINIDE SCH MG: 120 TABLET ORAL at 20:53

## 2019-01-07 RX ADMIN — HEPARIN SODIUM SCH UNIT: 5000 INJECTION INTRAVENOUS; SUBCUTANEOUS at 09:36

## 2019-01-07 RX ADMIN — SEVELAMER CARBONATE SCH MG: 800 TABLET, FILM COATED ORAL at 17:48

## 2019-01-07 RX ADMIN — PENTOXIFYLLINE SCH MG: 400 TABLET, FILM COATED, EXTENDED RELEASE ORAL at 20:53

## 2019-01-07 RX ADMIN — VANCOMYCIN HYDROCHLORIDE SCH MG: 250 CAPSULE ORAL at 05:46

## 2019-01-07 RX ADMIN — NATEGLINIDE SCH MG: 120 TABLET ORAL at 17:48

## 2019-01-07 RX ADMIN — INSULIN LISPRO SCH UNIT: 100 INJECTION, SOLUTION INTRAVENOUS; SUBCUTANEOUS at 07:30

## 2019-01-07 RX ADMIN — FENOFIBRATE SCH MG: 48 TABLET ORAL at 08:57

## 2019-01-07 RX ADMIN — INSULIN LISPRO SCH UNIT: 100 INJECTION, SOLUTION INTRAVENOUS; SUBCUTANEOUS at 18:21

## 2019-01-07 RX ADMIN — PENTOXIFYLLINE SCH MG: 400 TABLET, FILM COATED, EXTENDED RELEASE ORAL at 17:48

## 2019-01-07 RX ADMIN — INSULIN LISPRO SCH UNIT: 100 INJECTION, SOLUTION INTRAVENOUS; SUBCUTANEOUS at 11:30

## 2019-01-07 NOTE — NUR
Received report from dialysis nurse that fistula to L arm is infiltrated. Pressure dressing 
applied by dialysis nurse and will reschedule dialysis tomorrow am.

## 2019-01-07 NOTE — NUR
Received pt in bed watching tv. No s/s of resp distress. Denies pain at this time. Call 
light within reach and instructed to call for assistance. Pt verbalized understanding.

## 2019-01-07 NOTE — PROGRESS NOTE
DATE:  January 07, 2019 



SUBJECTIVE:  Patient seen at bedside.  Decreased discomfort to both lower 

extremities.  Denies any history of fever, chills, nausea, or vomiting.  



OBJECTIVE 

VITALS:  Afebrile, pulse rate 65, respirations 18, blood pressure 136/69, 

O2 saturation 94%. 

EXTREMITIES:  Toe is looking somewhat better dorsally.  Still some necrosis 

plantar with bone exposed.  Grade 3 ulceration, right heel also responding 

very well to local wound care with IV antibiotics.  



LABS:  Noted.  White blood cell count 7.89, hemoglobin 11 with a platelet 

count of 299,000.    



ASSESSMENT:  Osteomyelitis and cellulitis, 2nd toe, left foot.  Gangrenous 

changes with a grade 3 ulcer, right.



PLAN:  Will continue to let the foot and toe demarcate.  Continue IV 

antibiotics and local wound care.  Definitive procedure will be done some 

time this week.











DD:  01/07/2019 08:43

DT:  01/07/2019 08:50

Job#:  [id number]

## 2019-01-08 VITALS — SYSTOLIC BLOOD PRESSURE: 150 MMHG | DIASTOLIC BLOOD PRESSURE: 79 MMHG

## 2019-01-08 VITALS — DIASTOLIC BLOOD PRESSURE: 71 MMHG | SYSTOLIC BLOOD PRESSURE: 161 MMHG

## 2019-01-08 VITALS — SYSTOLIC BLOOD PRESSURE: 161 MMHG | DIASTOLIC BLOOD PRESSURE: 71 MMHG

## 2019-01-08 VITALS — SYSTOLIC BLOOD PRESSURE: 158 MMHG | DIASTOLIC BLOOD PRESSURE: 72 MMHG

## 2019-01-08 VITALS — SYSTOLIC BLOOD PRESSURE: 137 MMHG | DIASTOLIC BLOOD PRESSURE: 63 MMHG

## 2019-01-08 VITALS — SYSTOLIC BLOOD PRESSURE: 149 MMHG | DIASTOLIC BLOOD PRESSURE: 74 MMHG

## 2019-01-08 VITALS — SYSTOLIC BLOOD PRESSURE: 151 MMHG | DIASTOLIC BLOOD PRESSURE: 80 MMHG

## 2019-01-08 LAB
ANION GAP SERPL CALC-SCNC: 22.1 MMOL/L (ref 8–16)
BASOPHILS # BLD AUTO: 0.1 10*3/UL (ref 0–0.1)
BASOPHILS NFR BLD AUTO: 0.7 % (ref 0–1)
BUN SERPL-MCNC: 71 MG/DL (ref 7–26)
BUN/CREAT SERPL: 6 (ref 6–25)
CALCIUM SERPL-MCNC: 7.8 MG/DL (ref 8.4–10.2)
CHLORIDE SERPL-SCNC: 97 MMOL/L (ref 98–107)
CO2 SERPL-SCNC: 20 MMOL/L (ref 22–29)
DEPRECATED NEUTROPHILS # BLD AUTO: 4.5 10*3/UL (ref 2.1–6.9)
EGFRCR SERPLBLD CKD-EPI 2021: 4 ML/MIN (ref 60–?)
EOSINOPHIL # BLD AUTO: 0.4 10*3/UL (ref 0–0.4)
EOSINOPHIL NFR BLD AUTO: 6.3 % (ref 0–6)
ERYTHROCYTE [DISTWIDTH] IN CORD BLOOD: 14.4 % (ref 11.7–14.4)
GLUCOSE SERPLBLD-MCNC: 166 MG/DL (ref 74–118)
HCT VFR BLD AUTO: 30.4 % (ref 38.2–49.6)
HGB BLD-MCNC: 9.9 G/DL (ref 14–18)
LYMPHOCYTES # BLD: 1.3 10*3/UL (ref 1–3.2)
LYMPHOCYTES NFR BLD AUTO: 19.4 % (ref 18–39.1)
MCH RBC QN AUTO: 30.4 PG (ref 28–32)
MCHC RBC AUTO-ENTMCNC: 32.6 G/DL (ref 31–35)
MCV RBC AUTO: 93.3 FL (ref 81–99)
MONOCYTES # BLD AUTO: 0.5 10*3/UL (ref 0.2–0.8)
MONOCYTES NFR BLD AUTO: 7.4 % (ref 4.4–11.3)
NEUTS SEG NFR BLD AUTO: 65.8 % (ref 38.7–80)
PLATELET # BLD AUTO: 262 X10E3/UL (ref 140–360)
POTASSIUM SERPL-SCNC: 5.1 MMOL/L (ref 3.5–5.1)
RBC # BLD AUTO: 3.26 X10E6/UL (ref 4.3–5.7)
SODIUM SERPL-SCNC: 134 MMOL/L (ref 136–145)

## 2019-01-08 RX ADMIN — SEVELAMER CARBONATE SCH MG: 800 TABLET, FILM COATED ORAL at 12:00

## 2019-01-08 RX ADMIN — NATEGLINIDE SCH MG: 120 TABLET ORAL at 21:38

## 2019-01-08 RX ADMIN — PENTOXIFYLLINE SCH MG: 400 TABLET, FILM COATED, EXTENDED RELEASE ORAL at 08:34

## 2019-01-08 RX ADMIN — NATEGLINIDE SCH MG: 120 TABLET ORAL at 16:56

## 2019-01-08 RX ADMIN — INSULIN LISPRO SCH UNIT: 100 INJECTION, SOLUTION INTRAVENOUS; SUBCUTANEOUS at 16:59

## 2019-01-08 RX ADMIN — PANTOPRAZOLE SODIUM SCH MG: 40 TABLET, DELAYED RELEASE ORAL at 08:33

## 2019-01-08 RX ADMIN — Medication SCH MG: at 08:33

## 2019-01-08 RX ADMIN — HEPARIN SODIUM SCH UNIT: 5000 INJECTION INTRAVENOUS; SUBCUTANEOUS at 09:00

## 2019-01-08 RX ADMIN — PENTOXIFYLLINE SCH MG: 400 TABLET, FILM COATED, EXTENDED RELEASE ORAL at 21:38

## 2019-01-08 RX ADMIN — CEFEPIME HYDROCHLORIDE SCH MLS/HR: 1 INJECTION, POWDER, FOR SOLUTION INTRAMUSCULAR; INTRAVENOUS at 16:56

## 2019-01-08 RX ADMIN — PENTOXIFYLLINE SCH MG: 400 TABLET, FILM COATED, EXTENDED RELEASE ORAL at 16:56

## 2019-01-08 RX ADMIN — INSULIN LISPRO SCH UNIT: 100 INJECTION, SOLUTION INTRAVENOUS; SUBCUTANEOUS at 12:00

## 2019-01-08 RX ADMIN — VANCOMYCIN HYDROCHLORIDE SCH MLS/HR: 1 INJECTION, SOLUTION INTRAVENOUS at 19:27

## 2019-01-08 RX ADMIN — INSULIN LISPRO SCH UNIT: 100 INJECTION, SOLUTION INTRAVENOUS; SUBCUTANEOUS at 21:55

## 2019-01-08 RX ADMIN — OLMESARTAN MEDOXOMIL SCH MG: 20 TABLET, FILM COATED ORAL at 17:03

## 2019-01-08 RX ADMIN — INSULIN LISPRO SCH UNIT: 100 INJECTION, SOLUTION INTRAVENOUS; SUBCUTANEOUS at 07:30

## 2019-01-08 RX ADMIN — HEPARIN SODIUM SCH UNIT: 5000 INJECTION INTRAVENOUS; SUBCUTANEOUS at 21:55

## 2019-01-08 RX ADMIN — NATEGLINIDE SCH MG: 120 TABLET ORAL at 08:33

## 2019-01-08 RX ADMIN — SEVELAMER CARBONATE SCH MG: 800 TABLET, FILM COATED ORAL at 16:56

## 2019-01-08 RX ADMIN — FENOFIBRATE SCH MG: 48 TABLET ORAL at 08:34

## 2019-01-08 RX ADMIN — SEVELAMER CARBONATE SCH MG: 800 TABLET, FILM COATED ORAL at 08:00

## 2019-01-08 NOTE — NUR
Received pt in bed watching tv. No s/s of resp distress. Denies pain at this time. Dressing 
c,d,i and changed prior to shift change. Call light within reach and instructed to call for 
assistance. Pt verbalized understanding. Urinalysis is consistent with UTI - I am prescribing ciprofloxacin for patient to take for 10 days. ( male complicated UTI) I have ordered a urine culture - if patient is able to come in today and leave a urine smple would be great otherwsie can start antibiotic today and not do culture  Kidney and liver function are normal  Patient has pre diabetes - recommend diet modification and exercise 20-30 minutes daily. Decrease cabs and sweets. Repeat in 6 months  Cholesterol: Triglycerides are normal ( short term fat storage), HDL good cholesterol is at goal,  LDL which is bad cholesterol is elevated. Recommend increasing  exercise to 30 minutes daily and increased fiber intake - vegetables, fruits and oats and whole grain. Decreasing fatty food intake.  Recommend starting on lipid lowering medication if patient agrees start lipitor 20mg daily

## 2019-01-08 NOTE — PROGRESS NOTE
DATE:  January 08, 2019 



SUBJECTIVE:  Patient seen at bedside, doing somewhat better, about to get 

dialysis.



OBJECTIVE:  Vitals:  Afebrile.  Pulse rate 65, respirations 18, blood 

pressure 137/62.  O2 saturation 98%. 



Labs noted.  White blood cell count of 7.89, hemoglobin 11.1.  



Has bone exposed to the 2nd toe, left foot.  Toes are starting to look a 

little bit better.  There are some foul smell present and some drainage but 

seems to be responding very well to the IV antibiotics.  Ulceration to the 

right heel is also better.  Granular fibrotic base noted down to muscle.  

No bone or tendon exposed.



ASSESSMENT

1. Grade-3 ulcer, right.  

2. Grade-4 ulcer, left.  

3. Cellulitis with osteomyelitis and possible abscess.



PLAN:  Will continue IV antibiotics for a couple more days.  Patient will 

be taken for surgical intervention on Friday.  Amputation level will be 

determined intraoperatively.  Will continue local wound care, offloading 

and IV antibiotics for now.  









DD:  01/08/2019 08:58

DT:  01/08/2019 09:26

Job#:  [id number]

## 2019-01-09 VITALS — DIASTOLIC BLOOD PRESSURE: 70 MMHG | SYSTOLIC BLOOD PRESSURE: 152 MMHG

## 2019-01-09 VITALS — DIASTOLIC BLOOD PRESSURE: 63 MMHG | SYSTOLIC BLOOD PRESSURE: 141 MMHG

## 2019-01-09 VITALS — SYSTOLIC BLOOD PRESSURE: 123 MMHG | DIASTOLIC BLOOD PRESSURE: 59 MMHG

## 2019-01-09 VITALS — SYSTOLIC BLOOD PRESSURE: 126 MMHG | DIASTOLIC BLOOD PRESSURE: 62 MMHG

## 2019-01-09 VITALS — SYSTOLIC BLOOD PRESSURE: 132 MMHG | DIASTOLIC BLOOD PRESSURE: 55 MMHG

## 2019-01-09 VITALS — SYSTOLIC BLOOD PRESSURE: 159 MMHG | DIASTOLIC BLOOD PRESSURE: 71 MMHG

## 2019-01-09 LAB
BASOPHILS # BLD AUTO: 0.1 10*3/UL (ref 0–0.1)
BASOPHILS NFR BLD AUTO: 1.1 % (ref 0–1)
DEPRECATED NEUTROPHILS # BLD AUTO: 4.5 10*3/UL (ref 2.1–6.9)
EOSINOPHIL # BLD AUTO: 0.4 10*3/UL (ref 0–0.4)
EOSINOPHIL NFR BLD AUTO: 5.4 % (ref 0–6)
ERYTHROCYTE [DISTWIDTH] IN CORD BLOOD: 14.3 % (ref 11.7–14.4)
HCT VFR BLD AUTO: 35.1 % (ref 38.2–49.6)
HGB BLD-MCNC: 11.1 G/DL (ref 14–18)
LYMPHOCYTES # BLD: 1.7 10*3/UL (ref 1–3.2)
LYMPHOCYTES NFR BLD AUTO: 23.5 % (ref 18–39.1)
MCH RBC QN AUTO: 29.8 PG (ref 28–32)
MCHC RBC AUTO-ENTMCNC: 31.6 G/DL (ref 31–35)
MCV RBC AUTO: 94.4 FL (ref 81–99)
MONOCYTES # BLD AUTO: 0.7 10*3/UL (ref 0.2–0.8)
MONOCYTES NFR BLD AUTO: 9.2 % (ref 4.4–11.3)
NEUTS SEG NFR BLD AUTO: 60.4 % (ref 38.7–80)
PLATELET # BLD AUTO: 264 X10E3/UL (ref 140–360)
RBC # BLD AUTO: 3.72 X10E6/UL (ref 4.3–5.7)

## 2019-01-09 RX ADMIN — INSULIN LISPRO SCH UNIT: 100 INJECTION, SOLUTION INTRAVENOUS; SUBCUTANEOUS at 16:28

## 2019-01-09 RX ADMIN — HEPARIN SODIUM SCH UNIT: 5000 INJECTION INTRAVENOUS; SUBCUTANEOUS at 09:36

## 2019-01-09 RX ADMIN — CEFEPIME HYDROCHLORIDE SCH MLS/HR: 1 INJECTION, POWDER, FOR SOLUTION INTRAMUSCULAR; INTRAVENOUS at 09:35

## 2019-01-09 RX ADMIN — INSULIN LISPRO SCH UNIT: 100 INJECTION, SOLUTION INTRAVENOUS; SUBCUTANEOUS at 11:30

## 2019-01-09 RX ADMIN — SEVELAMER CARBONATE SCH MG: 800 TABLET, FILM COATED ORAL at 09:00

## 2019-01-09 RX ADMIN — NATEGLINIDE SCH MG: 120 TABLET ORAL at 09:35

## 2019-01-09 RX ADMIN — Medication SCH MG: at 09:35

## 2019-01-09 RX ADMIN — NATEGLINIDE SCH MG: 120 TABLET ORAL at 22:51

## 2019-01-09 RX ADMIN — NATEGLINIDE SCH MG: 120 TABLET ORAL at 15:22

## 2019-01-09 RX ADMIN — PANTOPRAZOLE SODIUM SCH MG: 40 TABLET, DELAYED RELEASE ORAL at 09:35

## 2019-01-09 RX ADMIN — OLMESARTAN MEDOXOMIL SCH MG: 20 TABLET, FILM COATED ORAL at 09:35

## 2019-01-09 RX ADMIN — HEPARIN SODIUM SCH UNIT: 5000 INJECTION INTRAVENOUS; SUBCUTANEOUS at 22:51

## 2019-01-09 RX ADMIN — INSULIN LISPRO SCH UNIT: 100 INJECTION, SOLUTION INTRAVENOUS; SUBCUTANEOUS at 08:30

## 2019-01-09 RX ADMIN — VANCOMYCIN HYDROCHLORIDE SCH MLS/HR: 1 INJECTION, SOLUTION INTRAVENOUS at 08:55

## 2019-01-09 RX ADMIN — FENOFIBRATE SCH MG: 48 TABLET ORAL at 09:35

## 2019-01-09 RX ADMIN — PENTOXIFYLLINE SCH MG: 400 TABLET, FILM COATED, EXTENDED RELEASE ORAL at 22:51

## 2019-01-09 RX ADMIN — PENTOXIFYLLINE SCH MG: 400 TABLET, FILM COATED, EXTENDED RELEASE ORAL at 15:22

## 2019-01-09 RX ADMIN — INSULIN LISPRO SCH UNIT: 100 INJECTION, SOLUTION INTRAVENOUS; SUBCUTANEOUS at 21:00

## 2019-01-09 RX ADMIN — PENTOXIFYLLINE SCH MG: 400 TABLET, FILM COATED, EXTENDED RELEASE ORAL at 09:35

## 2019-01-09 RX ADMIN — SEVELAMER CARBONATE SCH MG: 800 TABLET, FILM COATED ORAL at 12:30

## 2019-01-09 RX ADMIN — SEVELAMER CARBONATE SCH MG: 800 TABLET, FILM COATED ORAL at 16:07

## 2019-01-09 NOTE — NUR
Nutrition Intervention Note



RD Recommendation(s) for Physician: 

- Add 2000 ADA to current diet 

- Recommend Renal MVI

- Consider Vitamin C and Zinc to promote wound healing

- Beneprotein 1 packet TID

- Arpit 1 packet BID to promote wound healing 



Plan of Care: RD following, monitoring for tolerance and adequacy 



Nutrition reason for involvement: LOS 



RD Assessment

1/9: 63 YOM admitted for gangrene of L great toe with ulcer requiring surgical intervention 
this coming Friday. Pt seen today for LOS, sleeping at time of visit with no family at 
bedside. Unable to obtain nutrition hx at this time. Pt reviewed during am rounds, pt eating 
% of meals with no reported GI distress. Pt with surgery scheduled for Friday. Pt with 
ESRD on HD, current renal trend noted. Will continue to monitor. 



Principal Problems/Diagnoses: Gangrene L great toe



PMH: DM2, HTN, ESRD on HD, morbid obesity, multiple diabetic foot ulcerations



GI: LBM 1/8 



Skin: L great toe gangrene with stage IV ulcer per MD, R heel with abscess- hx of unstagable 
ulcer to R heel 



Labs: 1/8: na 134, K 5.1, BUN 71, Cr 12.38, Gluc 166 



Meds: renvela, protonix, omega 3s, starlix, tricor levemir, humalog, zofran 



Ht: 70 in

Wt: 244 lb

BMI: 35

IBW: 166 lb 



Malnutrition Evaluation (1/9)

The patient does not meet criteria for a specified degree of malnutrition at this time. Will 
re-evaluate at follow-up as appropriate. 



Nutrition Prescription (Diet Order): Renal 



Estimated Nutritional Needs:

0332-2327 calories/day (22-25 kcal/kg IBW)

 g protein/day (1.2-1.5 g pro/kg IBW)



Diet Adequacy:

Meeting calorie needs, Meeting protein needs



Diet Education Needs Assessment:

Diet education not indicated at this time. 



Nutrition Care Level: Mod 



Nutrition Diagnosis: Increased nutrient needs (protein, vitamin, minerals) as related to 
skin integrity as evidenced by gangrenous foot ulcers requiring surgical interventions.





Goal: Patient will meet % of estimated needs by follow up 

Progress: N/A



Interventions:

CHO, mineral modified diet, Commercial beverage, Recommended Modifications, 
Multivitamin/mineral supplement therapy, Collaboration with other providers



Monitoring/Evaluation:

Total energy intake, Total protein intake, Modified diet, Liquid supplement





Signed: Nya Coelho RD, LD, [facility]

## 2019-01-09 NOTE — NUR
CM SPOKE TO DR. BRENNER REGARDING PATIENT PLAN OF CARE. PATIENT SCHEDULED FOR SURGICAL 
INTERVENTION FRIDAY 1/ 11/ 19: AMPUTATION LEVEL DETERMINED INTRAOPERATIVELY. DR. BRENNER STATES 
HE WILL DECIDE PLAN OF CARE MONDAY POST PROCEDURE WHEN HE IS AWARE OF THE EXTENT OF THE 
AMPUTATION. CM TO FOLLOW UP ON MONDAY MORNING.

## 2019-01-09 NOTE — PROGRESS NOTE
DATE:  January 09, 2019 



SUBJECTIVE:  Patient is doing well.  Decreased discomfort to both lower 

extremities.  Denies any history of fever, chills, nausea, or vomiting.  



OBJECTIVE  

VITALS:  Afebrile, pulse rate 68, respirations 18, blood pressure 126/62, 

O2 saturation 97%. 

EXTREMITIES:  Second toe looking a little bit better.  Ulceration to the 

right heel also is better.  Pedal pulses are diminished.  



His white blood cell count is 7.4, hemoglobin 11.1, hematocrit 35.1 with a 

platelet count of 264,000.  Blood glucose of 133.  



ASSESSMENT 

1.  Peripheral artery disease with a grade 4 ulcer and osteomyelitis, 2nd 

toe, left foot.  

2.  Possible abscess with grade 3 ulcer, right foot.



PLAN:  Will continue local wound care.  Continue to let the foot demarcate. 

 Patient will be scheduled for surgical intervention on Friday.











DD:  01/09/2019 09:12

DT:  01/09/2019 09:19

Job#:  [id number]

## 2019-01-09 NOTE — NUR
PATIENT RECEIVED. PATIENT IS RESTING IN BED, AAOX3. RESP EVEN AND UNLABORED. NO ACUTE 
DISTRESS NOTED. PATIENT DENIES OF ANY PAIN OR DISCOMFORT AT THIS TIME. RIGHT AND LEFT FEET 
DRESSING NOTED, DRY AND INTACT. CALL LIGHT WITHIN REACH. BED LOW/LOCKED. CONTINUE TO MONITOR 
CLOSELY

## 2019-01-10 VITALS — SYSTOLIC BLOOD PRESSURE: 135 MMHG | DIASTOLIC BLOOD PRESSURE: 60 MMHG

## 2019-01-10 VITALS — SYSTOLIC BLOOD PRESSURE: 157 MMHG | DIASTOLIC BLOOD PRESSURE: 72 MMHG

## 2019-01-10 VITALS — DIASTOLIC BLOOD PRESSURE: 74 MMHG | SYSTOLIC BLOOD PRESSURE: 130 MMHG

## 2019-01-10 VITALS — SYSTOLIC BLOOD PRESSURE: 159 MMHG | DIASTOLIC BLOOD PRESSURE: 69 MMHG

## 2019-01-10 VITALS — DIASTOLIC BLOOD PRESSURE: 69 MMHG | SYSTOLIC BLOOD PRESSURE: 159 MMHG

## 2019-01-10 VITALS — DIASTOLIC BLOOD PRESSURE: 65 MMHG | SYSTOLIC BLOOD PRESSURE: 142 MMHG

## 2019-01-10 VITALS — SYSTOLIC BLOOD PRESSURE: 128 MMHG | DIASTOLIC BLOOD PRESSURE: 62 MMHG

## 2019-01-10 VITALS — SYSTOLIC BLOOD PRESSURE: 159 MMHG | DIASTOLIC BLOOD PRESSURE: 71 MMHG

## 2019-01-10 LAB
ALBUMIN SERPL-MCNC: 3 G/DL (ref 3.5–5)
ALBUMIN/GLOB SERPL: 0.8 {RATIO} (ref 0.8–2)
ALP SERPL-CCNC: 48 IU/L (ref 40–150)
ALT SERPL-CCNC: 14 IU/L (ref 0–55)
ANION GAP SERPL CALC-SCNC: 22.7 MMOL/L (ref 8–16)
BUN SERPL-MCNC: 60 MG/DL (ref 7–26)
BUN/CREAT SERPL: 5 (ref 6–25)
CALCIUM SERPL-MCNC: 8.5 MG/DL (ref 8.4–10.2)
CHLORIDE SERPL-SCNC: 92 MMOL/L (ref 98–107)
CO2 SERPL-SCNC: 21 MMOL/L (ref 22–29)
EGFRCR SERPLBLD CKD-EPI 2021: 5 ML/MIN (ref 60–?)
GLOBULIN PLAS-MCNC: 4 G/DL (ref 2.3–3.5)
GLUCOSE SERPLBLD-MCNC: 179 MG/DL (ref 74–118)
POTASSIUM SERPL-SCNC: 4.7 MMOL/L (ref 3.5–5.1)
SODIUM SERPL-SCNC: 131 MMOL/L (ref 136–145)

## 2019-01-10 RX ADMIN — NATEGLINIDE SCH MG: 120 TABLET ORAL at 09:25

## 2019-01-10 RX ADMIN — PANTOPRAZOLE SODIUM SCH MG: 40 TABLET, DELAYED RELEASE ORAL at 09:25

## 2019-01-10 RX ADMIN — CEFEPIME HYDROCHLORIDE SCH MLS/HR: 1 INJECTION, POWDER, FOR SOLUTION INTRAMUSCULAR; INTRAVENOUS at 09:00

## 2019-01-10 RX ADMIN — SEVELAMER CARBONATE SCH MG: 800 TABLET, FILM COATED ORAL at 09:26

## 2019-01-10 RX ADMIN — Medication SCH MG: at 09:25

## 2019-01-10 RX ADMIN — INSULIN LISPRO SCH UNIT: 100 INJECTION, SOLUTION INTRAVENOUS; SUBCUTANEOUS at 11:30

## 2019-01-10 RX ADMIN — SEVELAMER CARBONATE SCH MG: 800 TABLET, FILM COATED ORAL at 16:58

## 2019-01-10 RX ADMIN — Medication SCH MG: at 09:24

## 2019-01-10 RX ADMIN — PENTOXIFYLLINE SCH MG: 400 TABLET, FILM COATED, EXTENDED RELEASE ORAL at 15:52

## 2019-01-10 RX ADMIN — NATEGLINIDE SCH MG: 120 TABLET ORAL at 21:47

## 2019-01-10 RX ADMIN — FENOFIBRATE SCH MG: 48 TABLET ORAL at 09:25

## 2019-01-10 RX ADMIN — CEFEPIME HYDROCHLORIDE SCH MLS/HR: 1 INJECTION, POWDER, FOR SOLUTION INTRAMUSCULAR; INTRAVENOUS at 15:05

## 2019-01-10 RX ADMIN — INSULIN LISPRO SCH UNIT: 100 INJECTION, SOLUTION INTRAVENOUS; SUBCUTANEOUS at 21:00

## 2019-01-10 RX ADMIN — PENTOXIFYLLINE SCH MG: 400 TABLET, FILM COATED, EXTENDED RELEASE ORAL at 09:25

## 2019-01-10 RX ADMIN — OLMESARTAN MEDOXOMIL SCH MG: 20 TABLET, FILM COATED ORAL at 09:00

## 2019-01-10 RX ADMIN — HEPARIN SODIUM SCH UNIT: 5000 INJECTION INTRAVENOUS; SUBCUTANEOUS at 09:25

## 2019-01-10 RX ADMIN — INSULIN LISPRO SCH UNIT: 100 INJECTION, SOLUTION INTRAVENOUS; SUBCUTANEOUS at 07:30

## 2019-01-10 RX ADMIN — PENTOXIFYLLINE SCH MG: 400 TABLET, FILM COATED, EXTENDED RELEASE ORAL at 21:47

## 2019-01-10 RX ADMIN — NATEGLINIDE SCH MG: 120 TABLET ORAL at 15:52

## 2019-01-10 RX ADMIN — INSULIN LISPRO SCH UNIT: 100 INJECTION, SOLUTION INTRAVENOUS; SUBCUTANEOUS at 17:20

## 2019-01-10 RX ADMIN — HEPARIN SODIUM SCH UNIT: 5000 INJECTION INTRAVENOUS; SUBCUTANEOUS at 21:00

## 2019-01-10 NOTE — NUR
PATIENT RECEIVED. PATIENT IS RESTING IN BED, AAOX3. RESP EVEN AND UNLABORED. NO ACUTE 
DISTRESS NOTED. PATIENT DENIES OF ANY PAIN OR DISCOMFORT AT THIS TIME. RIGHT AND LEFT FEET 
DRESSING NOTED, DRY AND INTACT. REMIND PATIENT THAT HE WILL BE NPO BY MID NIGHT FOR SX 
TOMORROW. CALL LIGHT WITHIN REACH. BED LOW/LOCKED. CONTINUE TO MONITOR CLOSELY

## 2019-01-10 NOTE — PROGRESS NOTE
DATE:  January 10, 2019 



SUBJECTIVE:  Patient seen at bedside accompanied by daughter.  Feeling 

okay.  Denies history fever, chills, nausea, or vomiting.



OBJECTIVE   

VITALS:  Afebrile, pulse rate 67, respirations 20, blood pressure 142/65, 

O2 saturation 97%. 

EXTREMITIES:  Has an ulceration with bone exposed to the 2nd toe, left 

foot.  The toe is looking a little bit better.  



LABS:  Show a white blood cell count of 7.4 and hemoglobin 11.1.  



ASSESSMENT:  Osteomyelitis and abscess with gangrenous changes noted to the 

2nd toe, left foot with an ulceration, right heel.  Doing better with 

granular fibrotic base down to muscle.  No tendon or bone exposed.



PLAN:  Patient will be taken for surgical intervention tomorrow.  Surgery 

will consist of I and D of abscess, amputation, partial or complete 

amputation of toe with rotational flap closure.  Patient understands.  No 

warranties or guarantees can be given.  If not responsive, may need a more 

proximal amputation.  Will be kept n.p.o. after midnight.  











DD:  01/10/2019 08:48

DT:  01/10/2019 09:05

Job#:  [id number]

## 2019-01-10 NOTE — NUR
PT IN BED, HAD DIALYSIS TODAY 4.5 LITERS REMOVED. SCHEDULED FOR PROCEDURE TOMORROW BY DR. HOPE, CONSENT SIGNED.NO DISTRESS NOTED

## 2019-01-11 VITALS — DIASTOLIC BLOOD PRESSURE: 70 MMHG | SYSTOLIC BLOOD PRESSURE: 131 MMHG

## 2019-01-11 VITALS — DIASTOLIC BLOOD PRESSURE: 64 MMHG | SYSTOLIC BLOOD PRESSURE: 135 MMHG

## 2019-01-11 VITALS — SYSTOLIC BLOOD PRESSURE: 153 MMHG | DIASTOLIC BLOOD PRESSURE: 69 MMHG

## 2019-01-11 VITALS — SYSTOLIC BLOOD PRESSURE: 134 MMHG | DIASTOLIC BLOOD PRESSURE: 63 MMHG

## 2019-01-11 VITALS — DIASTOLIC BLOOD PRESSURE: 70 MMHG | SYSTOLIC BLOOD PRESSURE: 163 MMHG

## 2019-01-11 VITALS — SYSTOLIC BLOOD PRESSURE: 151 MMHG | DIASTOLIC BLOOD PRESSURE: 67 MMHG

## 2019-01-11 VITALS — SYSTOLIC BLOOD PRESSURE: 112 MMHG | DIASTOLIC BLOOD PRESSURE: 55 MMHG

## 2019-01-11 PROCEDURE — 0HXNXZZ TRANSFER LEFT FOOT SKIN, EXTERNAL APPROACH: ICD-10-PCS | Performed by: PODIATRIST

## 2019-01-11 PROCEDURE — 0Y6S0Z1 DETACHMENT AT LEFT 2ND TOE, HIGH, OPEN APPROACH: ICD-10-PCS | Performed by: PODIATRIST

## 2019-01-11 RX ADMIN — INSULIN LISPRO SCH UNIT: 100 INJECTION, SOLUTION INTRAVENOUS; SUBCUTANEOUS at 17:25

## 2019-01-11 RX ADMIN — OLMESARTAN MEDOXOMIL SCH MG: 20 TABLET, FILM COATED ORAL at 09:30

## 2019-01-11 RX ADMIN — Medication SCH MG: at 09:30

## 2019-01-11 RX ADMIN — Medication SCH MG: at 09:29

## 2019-01-11 RX ADMIN — SEVELAMER CARBONATE SCH MG: 800 TABLET, FILM COATED ORAL at 09:29

## 2019-01-11 RX ADMIN — PENTOXIFYLLINE SCH MG: 400 TABLET, FILM COATED, EXTENDED RELEASE ORAL at 09:30

## 2019-01-11 RX ADMIN — SEVELAMER CARBONATE SCH MG: 800 TABLET, FILM COATED ORAL at 12:20

## 2019-01-11 RX ADMIN — FENOFIBRATE SCH MG: 48 TABLET ORAL at 09:30

## 2019-01-11 RX ADMIN — INSULIN LISPRO SCH UNIT: 100 INJECTION, SOLUTION INTRAVENOUS; SUBCUTANEOUS at 21:00

## 2019-01-11 RX ADMIN — NATEGLINIDE SCH MG: 120 TABLET ORAL at 16:05

## 2019-01-11 RX ADMIN — SEVELAMER CARBONATE SCH MG: 800 TABLET, FILM COATED ORAL at 17:24

## 2019-01-11 RX ADMIN — HEPARIN SODIUM SCH UNIT: 5000 INJECTION INTRAVENOUS; SUBCUTANEOUS at 09:30

## 2019-01-11 RX ADMIN — INSULIN LISPRO SCH UNIT: 100 INJECTION, SOLUTION INTRAVENOUS; SUBCUTANEOUS at 12:20

## 2019-01-11 RX ADMIN — PENTOXIFYLLINE SCH MG: 400 TABLET, FILM COATED, EXTENDED RELEASE ORAL at 16:05

## 2019-01-11 RX ADMIN — PANTOPRAZOLE SODIUM SCH MG: 40 TABLET, DELAYED RELEASE ORAL at 09:30

## 2019-01-11 RX ADMIN — PENTOXIFYLLINE SCH MG: 400 TABLET, FILM COATED, EXTENDED RELEASE ORAL at 21:42

## 2019-01-11 RX ADMIN — NATEGLINIDE SCH MG: 120 TABLET ORAL at 09:30

## 2019-01-11 RX ADMIN — INSULIN LISPRO SCH UNIT: 100 INJECTION, SOLUTION INTRAVENOUS; SUBCUTANEOUS at 09:25

## 2019-01-11 RX ADMIN — INSULIN LISPRO SCH UNIT: 100 INJECTION, SOLUTION INTRAVENOUS; SUBCUTANEOUS at 17:15

## 2019-01-11 RX ADMIN — NATEGLINIDE SCH MG: 120 TABLET ORAL at 22:00

## 2019-01-11 RX ADMIN — CEFEPIME HYDROCHLORIDE SCH MLS/HR: 1 INJECTION, POWDER, FOR SOLUTION INTRAMUSCULAR; INTRAVENOUS at 09:29

## 2019-01-11 NOTE — NUR
PT BACK ON UNIT FROM OR . LT SECOND TOE AMPUTATION COMPLETED, DRESSING INTACT ,DRY AND 
CLEAN. PT IS ALERT AND ORIENTED X4. DENIES PAIN, V/S WNL.

## 2019-01-11 NOTE — NUR
PT ARRIVING TO UNIT VIA WHEEL CHAIR ,NO DISTRESS NOTED, DENIES NEEDS, CALL LIGHT IN REACH, 
INSTRUCTED TO CALL WITH NEEDS

## 2019-01-11 NOTE — OPERATIVE REPORT
DATE OF PROCEDURE:  January 11, 2019 

 

PREOPERATIVE DIAGNOSES 

1.  Grade 4 ulcer, left foot.

2.  Osteomyelitis, 2nd toe, left foot.

3.  Gangrene, left foot.

4.  Abscess, left foot.



POSTOPERATIVE DIAGNOSES 

1.  Grade 4 ulcer, left foot.

2.  Osteomyelitis, 2nd toe, left foot.

3.  Gangrene, left foot.

4.  Abscess, left foot.



OPERATIVE PROCEDURES 

1.  Incision and drainage of abscess, left foot.

2.  Partial amputation of 2nd toe, left foot.

3.  Rotational flap closure, left foot.  



ANESTHESIA:  Local with IV sedation.  



HEMOSTASIS:  Esmarch bandage.



PROCEDURE IN DETAIL:  Patient was taken into the operating room and placed 

on the operating room table in the supine position.  Following IV sedation 

by the anesthesiologist, approximately 10 mL of 0.5% plain Marcaine were 

used to achieve local anesthesia to the above-mentioned surgical area.  

After that, the left lower extremity was then prepped and draped in the 

usual aseptic manner.  The following procedure was then performed.  



PROCEDURE #1:  I and D of abscess, left foot.  After an Esmarch bandage was 

applied up to the ankle joint area, I and D of abscess was done via sharp 

dissection down to the distal interphalangeal joint, both dorsally and 

plantarly.  The incision was carried down to the distal interphalangeal 

joint.  Abscess was encountered and cultured for aerobic and anaerobic 

growth.  The abscess was I and D down to bone.  Secondary to the infection: 





PROCEDURE #2:  Partial amputation, 2nd toe was performed.  The toe was then 

disarticulated at the proximal interphalangeal joint secondary to more 

necrosis.  More I and D had to be performed.  Necrotic tissue removed via 

sharp dissection.  Utilizing a bone cutting forceps, the distal half of the 

proximal phalanx was then cut and excised from the operation site in toto.  

At this point, the Esmarch bandage was then released, and all ligators or 

pumpers were bovied or ligated as necessary.  The area was then copiously 

flushed with sterile antibiotic solution and suctioned.  



PROCEDURE #3:  Rotational flap closure.  The incision was then carried more 

proximally, medially and plantarly laterally.  A lateral flap was then 

created and reapproximated utilizing 3-0 nylon in horizontal mattress type 

fashion.  The flap had to be remodeled a couple times to allow for proper 

closure with minimal skin tension.  A sterile dressing was then applied.  

The patient was then transferred from the OR to the recovery room with 

vital signs stable and neurovascular status tact.  No intraoperative 

complications were encountered.  Blood loss from the surgery was minimal.  

Patient to remain in hospital until tomorrow getting IV antibiotics.  After 

he gets dialyzed, he is able to go home wearing the surgical shoe, and is 

to follow up in the office within 1 week.  











DD:  01/11/2019 07:31

DT:  01/11/2019 07:54

Job#:  [id number]

## 2019-01-11 NOTE — DIAGNOSTIC IMAGING REPORT
Exam:Left foot radiographs-2 views 



History: Gangrene of left toe.



Comparison: Left foot radiographs 1/4/2019.



Findings: 

There has been interval amputation of the middle and distal phalanges of the

second toe, compared to radiograph on 1/4/2019. Punctate gas and soft tissue

edema is present in the proximal aspect of the second toe.



No radiographic evidence of osteomyelitis. No evidence of acute fracture or

malalignment. The Lisfranc alignment is unremarkable. There are diffuse

vascular calcifications. Scattered intertarsal mild degenerative changes. 



Impression: 

Status post interval partial amputation of the left second toe. Edema and small

amount of gas in the proximal second toe likely related to recent postoperative

changes.



No definite radiographic evidence of osteomyelitis. 



Signed by: Dr. Noman Cobb MD on 1/11/2019 10:23 AM

## 2019-01-12 VITALS — DIASTOLIC BLOOD PRESSURE: 56 MMHG | SYSTOLIC BLOOD PRESSURE: 105 MMHG

## 2019-01-12 VITALS — DIASTOLIC BLOOD PRESSURE: 55 MMHG | SYSTOLIC BLOOD PRESSURE: 108 MMHG

## 2019-01-12 VITALS — DIASTOLIC BLOOD PRESSURE: 74 MMHG | SYSTOLIC BLOOD PRESSURE: 164 MMHG

## 2019-01-12 VITALS — SYSTOLIC BLOOD PRESSURE: 164 MMHG | DIASTOLIC BLOOD PRESSURE: 74 MMHG

## 2019-01-12 VITALS — SYSTOLIC BLOOD PRESSURE: 119 MMHG | DIASTOLIC BLOOD PRESSURE: 57 MMHG

## 2019-01-12 VITALS — SYSTOLIC BLOOD PRESSURE: 171 MMHG | DIASTOLIC BLOOD PRESSURE: 66 MMHG

## 2019-01-12 VITALS — DIASTOLIC BLOOD PRESSURE: 62 MMHG | SYSTOLIC BLOOD PRESSURE: 115 MMHG

## 2019-01-12 RX ADMIN — OLMESARTAN MEDOXOMIL SCH MG: 20 TABLET, FILM COATED ORAL at 05:57

## 2019-01-12 RX ADMIN — INSULIN LISPRO SCH UNIT: 100 INJECTION, SOLUTION INTRAVENOUS; SUBCUTANEOUS at 16:30

## 2019-01-12 RX ADMIN — NATEGLINIDE SCH MG: 120 TABLET ORAL at 20:55

## 2019-01-12 RX ADMIN — NATEGLINIDE SCH MG: 120 TABLET ORAL at 09:00

## 2019-01-12 RX ADMIN — INSULIN LISPRO SCH UNIT: 100 INJECTION, SOLUTION INTRAVENOUS; SUBCUTANEOUS at 11:30

## 2019-01-12 RX ADMIN — PENTOXIFYLLINE SCH MG: 400 TABLET, FILM COATED, EXTENDED RELEASE ORAL at 20:55

## 2019-01-12 RX ADMIN — Medication SCH MG: at 14:06

## 2019-01-12 RX ADMIN — SEVELAMER CARBONATE SCH MG: 800 TABLET, FILM COATED ORAL at 17:47

## 2019-01-12 RX ADMIN — SEVELAMER CARBONATE SCH MG: 800 TABLET, FILM COATED ORAL at 12:00

## 2019-01-12 RX ADMIN — PENTOXIFYLLINE SCH MG: 400 TABLET, FILM COATED, EXTENDED RELEASE ORAL at 09:00

## 2019-01-12 RX ADMIN — PANTOPRAZOLE SODIUM SCH MG: 40 TABLET, DELAYED RELEASE ORAL at 14:06

## 2019-01-12 RX ADMIN — INSULIN LISPRO SCH UNIT: 100 INJECTION, SOLUTION INTRAVENOUS; SUBCUTANEOUS at 07:30

## 2019-01-12 RX ADMIN — CEFEPIME HYDROCHLORIDE SCH MLS/HR: 1 INJECTION, POWDER, FOR SOLUTION INTRAMUSCULAR; INTRAVENOUS at 09:00

## 2019-01-12 RX ADMIN — FENOFIBRATE SCH MG: 48 TABLET ORAL at 14:06

## 2019-01-12 RX ADMIN — PENTOXIFYLLINE SCH MG: 400 TABLET, FILM COATED, EXTENDED RELEASE ORAL at 15:00

## 2019-01-12 RX ADMIN — INSULIN LISPRO SCH UNIT: 100 INJECTION, SOLUTION INTRAVENOUS; SUBCUTANEOUS at 20:31

## 2019-01-12 RX ADMIN — SEVELAMER CARBONATE SCH MG: 800 TABLET, FILM COATED ORAL at 08:00

## 2019-01-12 RX ADMIN — Medication SCH MG: at 09:00

## 2019-01-12 RX ADMIN — NATEGLINIDE SCH MG: 120 TABLET ORAL at 15:00

## 2019-01-12 NOTE — PROGRESS NOTE
DATE:  January 12, 2019 



NEPHROLOGY FOLLOWUP



SUBJECTIVE:  Seen in dialysis.  Tolerating procedure. 



OBJECTIVE:

VITAL SIGNS:  Temperature 97.5, blood pressure 164/74, pulse 64.

CHEST:  Clear.

EXTREMITIES:  No edema.  Access is patent. 



ASSESSMENT

1. End-stage renal disease.

2. Type 2 diabetes.

3. End-organ damage.

4. Hypertension.



PLAN:  Hemodialysis today, 4-hour run, F160 filter.  Blood flow rate 400 mL 

per minute.  Dialysis flow rate at 800 mL per minute.  Plan to remove 3 to 

4 liters of fluid, 2 potassium bath.  We will follow along.







DD:  01/12/2019 11:31

DT:  01/12/2019 12:29

Job#:  [id number]

## 2019-01-12 NOTE — NUR
REPORT RECEIVED FROM OFF GOING NURSE, PT RESTING IN BED ALERT AND ORIENTED, FAMILY AT 
BEDSIDE, NO DISTRESS NOTED, CALL LIGHT IN REACH

## 2019-01-12 NOTE — NUR
BED SIDE HEMODIALYSIS COMPLETED. 4 LITERS REMOVED AS PER DIALYSIS NURSE. DRESSING CHANGED TO 
RIGHT HEEL ULCER. IN BED WITH CALL LIGHT AT REACH.

## 2019-01-12 NOTE — NUR
PATIENT IN BED RESTING WITH NO RESPIRATORY DISTRESS OBSERVED. DRESSING DRY AND INTACT TO 
LEFT FOOT. RIGHT HEEL ULCER WITH DRESSING IN PLACE. BED IN LOWER POSITION, CALL LIGHT AT 
REACH.

## 2019-01-13 VITALS — SYSTOLIC BLOOD PRESSURE: 108 MMHG | DIASTOLIC BLOOD PRESSURE: 52 MMHG

## 2019-01-13 VITALS — SYSTOLIC BLOOD PRESSURE: 115 MMHG | DIASTOLIC BLOOD PRESSURE: 63 MMHG

## 2019-01-13 VITALS — DIASTOLIC BLOOD PRESSURE: 58 MMHG | SYSTOLIC BLOOD PRESSURE: 121 MMHG

## 2019-01-13 RX ADMIN — SEVELAMER CARBONATE SCH MG: 800 TABLET, FILM COATED ORAL at 08:55

## 2019-01-13 RX ADMIN — Medication SCH MG: at 08:55

## 2019-01-13 RX ADMIN — INSULIN LISPRO SCH UNIT: 100 INJECTION, SOLUTION INTRAVENOUS; SUBCUTANEOUS at 07:30

## 2019-01-13 RX ADMIN — OLMESARTAN MEDOXOMIL SCH MG: 20 TABLET, FILM COATED ORAL at 08:55

## 2019-01-13 RX ADMIN — PENTOXIFYLLINE SCH MG: 400 TABLET, FILM COATED, EXTENDED RELEASE ORAL at 08:55

## 2019-01-13 RX ADMIN — FENOFIBRATE SCH MG: 48 TABLET ORAL at 08:55

## 2019-01-13 RX ADMIN — NATEGLINIDE SCH MG: 120 TABLET ORAL at 08:55

## 2019-01-13 RX ADMIN — PANTOPRAZOLE SODIUM SCH MG: 40 TABLET, DELAYED RELEASE ORAL at 08:55

## 2019-01-13 NOTE — NUR
PT RESTING IN BED WITH EYES CLOSED, NO DISTRESS NOTED, CALL LIGHT IN REACH, RISE AND FALL OF 
CHEST BREATHING NOTED

## 2019-01-13 NOTE — NUR
RECEIVED PATIENT RESTING IN BED. RESPIRATIONS EVEN AND UNLABORED. NO ACUTE DISTRESS NOTED. 
CALL LIGHT WITHIN REACH. BED IN THE LOWEST POSITION.

## 2019-01-13 NOTE — NUR
RECEIVED DISCHARGE ORDER FROM MD. PATIENT IN STABLE CONDITION. IV LINE DC'D WITH TIP INTACT 
AT 1046, PRESSURE APPLIED, NO BLEEDING NOTED. DISCHARGE TEACHING PROVIDED TO PATIENT AND 
DAUGHTER, THEY BOTH VERBALIZED UNDERSTANDING. ALL DISCHARGE PAPERWORK, PRESCRIPTIONS, AND 
PERSONAL ITEMS ON HAND INCLUDING CRUTCHES. PATIENT ACCOMPANIED TO PRIVATE AUTO VIA 
WHEELCHAIR BY STAFF.

## 2019-01-13 NOTE — DISCHARGE SUMMARY
PRIMARY CARE PHYSICIAN:  Dr. Chauncey Villagomez



CONSULTANTS

1. Dr. Ruby Becker.

2. Dr. Onel Salinas.

3. Dr. Nithin Barraza.



FINAL DIAGNOSES

1. Infected diabetic, left 2nd toe associated with diabetic toe 

ulceration and abscess with gangrene.

2. Baseline diabetes type 2, on insulin treatment.

3. Baseline end-stage renal disease, on dialysis.

4. Baseline hypertension and peripheral vascular disease.



SUMMARY/HOSPITAL COURSE:  This is a 63-year-old male with multiple medical 

problems as mentioned.  The patient came in with left 2nd toe gangrene 

associated with necrosis, infected diabetic toe ulceration associated with 

also left foot cellulitis.  The patient was given antibiotics.  Circulatory 

workup with adequate flow.  The patient does have peripheral vascular 

disease, but he does have collateral.  The patient received IV antibiotics 

and awaiting for demarcation of the left 2nd toe with necrosis and 

infection.  Subsequently, the patient underwent surgical intervention of 

left 2nd toe amputation.  The patient postoperatively is doing better.  He 

can maintain stability.  The patient had surgery on January 11, 2019.  

Since then, the patient has dialysis.  No fever.  No leukocytosis.  The 

patient is doing well.  He will go home today with the following 

instructions.



DISCHARGE INSTRUCTIONS

1. Resume home medication.

2. Cipro 500 mg daily for 10 days.

3. Ampicillin 5 mg b.i.d. for 10 days.

4. Tylenol No 3. every 6 hours p.r.n. for pain.

5. Zofran ODT 4 mg sublingual every 4 hours p.r.n. for nausea and 

vomiting.



Of note, his wound culture grew out to be gram-negative roberto carlos along with 

enterococcus species moderately.  The patient is otherwise stable.  

Discharged home today.  Follow with Dr. Barraza next week and followup with 

Dr. Chauncey Villagomez, his PCP in approximately 1 to 2 weeks.



Patient will continue with his dialysis as an outpatient, schedule the 

same.







DD:  01/13/2019 09:10

DT:  01/13/2019 09:26

Job#:  [id number]

## 2019-01-13 NOTE — NUR
PATIENT WAS PROVIDED CRUTCHES AND TEACHING PROVIDED. PATIENT AND DAUGHTER VERBALIZED 
UNDERSTANDING.

## 2020-03-12 ENCOUNTER — HOSPITAL ENCOUNTER (OUTPATIENT)
Dept: HOSPITAL 88 - ENDO | Age: 65
Discharge: HOME | End: 2020-03-12
Attending: INTERNAL MEDICINE
Payer: MEDICARE

## 2020-03-12 VITALS — DIASTOLIC BLOOD PRESSURE: 65 MMHG | SYSTOLIC BLOOD PRESSURE: 132 MMHG

## 2020-03-12 DIAGNOSIS — Z86.010: ICD-10-CM

## 2020-03-12 DIAGNOSIS — Z95.5: ICD-10-CM

## 2020-03-12 DIAGNOSIS — I25.10: ICD-10-CM

## 2020-03-12 DIAGNOSIS — Z79.82: ICD-10-CM

## 2020-03-12 DIAGNOSIS — N18.6: ICD-10-CM

## 2020-03-12 DIAGNOSIS — K31.89: ICD-10-CM

## 2020-03-12 DIAGNOSIS — Z79.4: ICD-10-CM

## 2020-03-12 DIAGNOSIS — Z01.810: ICD-10-CM

## 2020-03-12 DIAGNOSIS — K22.70: ICD-10-CM

## 2020-03-12 DIAGNOSIS — Z99.2: ICD-10-CM

## 2020-03-12 DIAGNOSIS — K29.60: Primary | ICD-10-CM

## 2020-03-12 DIAGNOSIS — I12.0: ICD-10-CM

## 2020-03-12 DIAGNOSIS — E11.22: ICD-10-CM

## 2020-03-12 LAB
ANION GAP SERPL CALC-SCNC: 17.4 MMOL/L (ref 8–16)
BASOPHILS # BLD AUTO: 0.1 10*3/UL (ref 0–0.1)
BASOPHILS NFR BLD AUTO: 1.1 % (ref 0–1)
BUN SERPL-MCNC: 26 MG/DL (ref 7–26)
BUN/CREAT SERPL: 4 (ref 6–25)
CALCIUM SERPL-MCNC: 9.7 MG/DL (ref 8.4–10.2)
CHLORIDE SERPL-SCNC: 96 MMOL/L (ref 98–107)
CO2 SERPL-SCNC: 30 MMOL/L (ref 22–29)
DEPRECATED APTT PLAS QN: 36.7 SECONDS (ref 23.8–35.5)
DEPRECATED INR PLAS: 0.99
DEPRECATED NEUTROPHILS # BLD AUTO: 4.5 10*3/UL (ref 2.1–6.9)
EGFRCR SERPLBLD CKD-EPI 2021: 9 ML/MIN (ref 60–?)
EOSINOPHIL # BLD AUTO: 0.4 10*3/UL (ref 0–0.4)
EOSINOPHIL NFR BLD AUTO: 5.3 % (ref 0–6)
ERYTHROCYTE [DISTWIDTH] IN CORD BLOOD: 19.4 % (ref 11.7–14.4)
GLUCOSE SERPLBLD-MCNC: 115 MG/DL (ref 74–118)
HCT VFR BLD AUTO: 38.2 % (ref 38.2–49.6)
HGB BLD-MCNC: 11.9 G/DL (ref 14–18)
LYMPHOCYTES # BLD: 1.7 10*3/UL (ref 1–3.2)
LYMPHOCYTES NFR BLD AUTO: 23.3 % (ref 18–39.1)
MCH RBC QN AUTO: 29.5 PG (ref 28–32)
MCHC RBC AUTO-ENTMCNC: 31.2 G/DL (ref 31–35)
MCV RBC AUTO: 94.8 FL (ref 81–99)
MONOCYTES # BLD AUTO: 0.6 10*3/UL (ref 0.2–0.8)
MONOCYTES NFR BLD AUTO: 7.9 % (ref 4.4–11.3)
NEUTS SEG NFR BLD AUTO: 62.1 % (ref 38.7–80)
PLATELET # BLD AUTO: 253 X10E3/UL (ref 140–360)
POTASSIUM SERPL-SCNC: 4.4 MMOL/L (ref 3.5–5.1)
PROTHROMBIN TIME: 13.7 SECONDS (ref 11.9–14.5)
RBC # BLD AUTO: 4.03 X10E6/UL (ref 4.3–5.7)
SODIUM SERPL-SCNC: 139 MMOL/L (ref 136–145)

## 2020-03-12 PROCEDURE — 93005 ELECTROCARDIOGRAM TRACING: CPT

## 2020-03-12 PROCEDURE — 85610 PROTHROMBIN TIME: CPT

## 2020-03-12 PROCEDURE — 36415 COLL VENOUS BLD VENIPUNCTURE: CPT

## 2020-03-12 PROCEDURE — 85025 COMPLETE CBC W/AUTO DIFF WBC: CPT

## 2020-03-12 PROCEDURE — 43239 EGD BIOPSY SINGLE/MULTIPLE: CPT

## 2020-03-12 PROCEDURE — 82948 REAGENT STRIP/BLOOD GLUCOSE: CPT

## 2020-03-12 PROCEDURE — 88312 SPECIAL STAINS GROUP 1: CPT

## 2020-03-12 PROCEDURE — 88305 TISSUE EXAM BY PATHOLOGIST: CPT

## 2020-03-12 PROCEDURE — 80048 BASIC METABOLIC PNL TOTAL CA: CPT

## 2020-03-12 PROCEDURE — 85730 THROMBOPLASTIN TIME PARTIAL: CPT

## 2020-03-12 NOTE — OPERATIVE REPORT
DATE OF PROCEDURE:  03/12/2020

 

SURGEON:  Roly Ring MD

 

PROCEDURE:  EGD with ERCP scope and biopsies.

 

INDICATIONS FOR PROCEDURE:  History of duodenal nodule on EGD in the recent past.  The

patient is in to re-examine nodule with side-viewing scope and obtain additional

biopsies. 

 

MEDICATIONS:  The patient was done under MAC, please see anesthesiologist's note.

 

PROCEDURE IN DETAIL:  With the patient in the left lateral decubitus position, a

flexible fiberoptic Olympus side-viewing scope was inserted into the esophagus and

advanced all the way to the second portion of the duodenum.  The ampulla was identified

and appeared to be within normal limits.  An approximately 1.2 cm nodule was noted just

proximal to the ampulla in the proximal second portion of the duodenum and biopsies were

obtained.  The scope was subsequently withdrawn and the patient tolerated the procedure

well. 

 

IMPRESSION:  Approximately 1.2 cm nodule, proximal duodenum, proximal to ampulla,

biopsied. 

 

PLAN:  Follow up histology.

 

 

 

 

______________________________

Roly Ring MD

 

[facility] [place] [place]

D:  03/12/2020 11:38:08

T:  03/12/2020 12:23:40

Job #:  [id number]

 

cc:            Chauncey Villagomez MD

## 2020-03-12 NOTE — XMS REPORT
Patient Summary Document

                             Created on: 2020



JOVITA IRWIN

External Reference #: [id number]

: 1955

Sex: Male



Demographics







                          Address                   [address] [place] [address] [place]  [address]

 

                          Home Phone                [phone]

 

                          Preferred Language        Unknown

 

                          Marital Status            Unknown

 

                          Hoahaoism Affiliation     Unknown

 

                          Race                      Unknown

 

                          Ethnic Group              Unknown





Author







                          Author                    [facility]

 

                          Address                   Unknown

 

                          Phone                     Unavailable







Care Team Providers







                    Care Team Member Name    Role                Phone

 

                    JAMEAYLIN ROSARIOVIC    Unavailable         Unavailable

 

                    RACHELL ERNST    Unavailable         Unavailable

 

                    LEE HOOD    Unavailable         Unavailable

 

                    NORA BRENNER        Unavailable         Unavailable

 

                    KEISHA RENTERIA AFBARON        Unavailable         Unavailable







Problems

This patient has no known problems.



Allergies, Adverse Reactions, Alerts

This patient has no known allergies or adverse reactions.



Medications

This patient has no known medications.



Results







           Test Description    Test Time    Test Comments    Text Results    Atomic Results    Result

 Comments

 

                CHEST SINGLE (PORTABLE)    2020 12:18:00                                                   

                                                       [address] [address]      Patient Name: JOVITA IRWIN                            
      MR #: [id number]                     : 1955                      
            Age/Sex: 64/M  Acct #: [id number]                              Req
#: [id number]  Adm Physician:                                                   
  Ordered by: SHAHRZAD MOHAN NP                            Report #: [id number]
[id number]        Location: ER                                      Room/Bed:         
           
___________________________________________________________________________________________________
   Procedure: [id number] DX/CHEST SINGLE (PORTABLE)  Exam Date: 20         
                  Exam Time: 1200                                              
REPORT STATUS: Signed    EXAMINATION:  CHEST SINGLE (PORTABLE)          INDICA
TION:          decreased bases, fine insp wheeze    2020    1200    Y      
COMPARISON:  None           FINDINGS:  AP view         TUBES and LINES:  None.  
   LUNGS:  Limited by low lung volumes and body habitus.  Bilateral airspace   
opacities.      PLEURA:  No significant pleural effusion or pneumothorax.      
HEART AND MEDIASTINUM:  The cardiomediastinal silhouette is prominent on this   
AP view, accentuated by low lung volumes..          BONES AND SOFT TISSUES:  No 
acute osseous lesion.  Soft tissues are   unremarkable.      UPPER ABDOMEN: No 
free air under the diaphragm.          IMPRESSION:    Bilateral airspace 
opacities, representing edema and/or pneumonia.         Signed by: Dr. Sarbjit Morton MD on 2020 12:19 PM        Dictated By: SARBJIT MORTON MD  
Electronically Signed By: SARBJIT MORTON MD on 20 1219  Transcribed By: 
LAMIN on 20 1219       COPY TO:   SHAHRZAD MOHAN NP              

 

                CT, CTA ABDOMEN    2019 18:05:00                    Addendum BeginsREPORT STATUS:A PATIENT 

ID:   [id number] I agree with the nonvascular findings detailed by Dr. Gillis. 
Additional nonvascular findings include: *Few subcentimeter arterially enhancing
foci in segments V and VI of the liver are likely shunts. Follow-up abdomen CT 
with and without intravenous contrast (liver protocol) may be obtained in 3-6 
months for reassessment.*Calcified gallbladder wall. Consider surgical 
consultation for further evaluation. Signed: Gala Song MDReport Verified 
Date/Time:  2019 18:05:35 Reading Location: [address] Radiology 
Reading RoomAddendum EndsFINAL REPORT PATIENT ID:   [id number] CT angiography of 
the abdominal aorta and pelvic arteries, 17-Dec-19 INDICATION: This is a 64 year
old male with a diagnosis of end-stage renal disease, presents for pretransplant
assessment.  TECHNIQUE: Spiral acquisition during intravenous contrast 
administration using a [employer] multidetector CT scanner. Images were obtained before 
and during the dynamic passage of intravenous contrast material.  Multi-planar 
3-D volume-rendering reconstruction was performed using an independent 
workstation interactively by the interpreting physician as well as the 3-D 
specialist for optimal visualisation of the abdominal aorta, pelvic arteries, 
and its proximal branches. Please refer to the contrast sheet scanned in the 
EPIC system for the amount and route of contrast given. This exam was performed 
according to our departmental dose-optimisation programme, which includes 
automated exposure control, adjustment of the mA and/or kV according to patient 
size and/or use of iterative reconstruction technique. Dose modulation, 
iterative reconstruction, and/or weight based adjustment of the mA/kV was 
utilized to reduce the radiation dose to as low as reasonably achievable. 
FINDINGS:  VASCULAR: Mitral annular calcification is seen. Left atrial 
prominence is identified. Calcification identified in the distal RCA. The 
abdominal aorta is normal in course, calibre and contour. No significant 
atherosclerosis is seen. No ectasia or aneurysmal dilation is present. There is 
no evidence of acute aortic pathology, specifically, there is no dissection, 
intramural hematoma, or contained rupture.  Quantitative dimensions of the 
abdominal aorta are as follows:   2.1 cm at the mesenteric segment; 1.9 cm at 
the renal segment,; and 1.5 cm at the aortic bifurcation.   The common iliac, 
external iliac, common femoral, and the visualized superficial femoral arteries,
bilaterally, are widely patent, with only minimal calcification identified. 
Representative dimensions of the left and the right external iliac arteries are 
7 and 7 mm, respectively. Similarly, the associated pelvic veins are patent with
no venous thrombosis identified.  Representative dimensions of the left and the 
right external iliac veins are 13 and 10 mm, respectively. The coeliac axis, 
SMA, JEMAL are patent. The JEMAL has calcification present. It appears the hepatic 
artery arises from the SMA, a normal variant. Patient has recent renal disease 
in the renal arteries are patent. NON-VASCULAR:- The some atelectatic changes 
are seen in the lung bases. No pleural effusion is identified. In the abdomen, 
the liver and spleen appears unremarkable. The liver edge is smooth. No abnormal
enhancing structures identified. The adrenal glands are not enlarged and the 
pancreas appears unremarkable. There are scattered calcification identified in 
the gallbladder wall. An addendum will be dictated thereafter, if needed. 
Patient end stage renal disease. No hydronephrosis or perirenal fluid collection
is identified. No obvious cyst is identified in the left kidney. A tiny cyst is 
identified in the inferior aspect of the right kidney, at image 116, too small 
to characterise. Bowel is not well assessed by CT angiography as enteric 
contrast is not given. No obvious bowel dilation is identified. The appendix 
appears unremarkable. Bilateral fat-containing inguinal hernia is identified. 
The bladder is not distended. The prostate is mildly prominent. No free air free
fluid seen abdomen pelvis and no significant retroperitoneal adenopathy is 
identified. In the bony windows, no acute bony pathology is seen. Some 
degenerative changes is noted. CONCLUSIONS: 1.  The abdominal aorta is normal in
course, calibre and contour.   No significant atherosclerosis is identified  
There is no evidence of acute aortic pathology, specifically, there is no dissec
tion, intramural hematoma, or contained rupture.  Quantitative dimension of the 
abdominal aorta are as noted. The pelvic arteries and veins are widely patent 
with no arterial stenosis or venous thrombosis identified. Representative 
dimensions of the left and the right external iliac arteries and veins are as 
described above. 2.  Patent mesenteric and renal arteries. 3.  Other findings as
described above. Patient has end-stage renal disease, and a tiny cyst is 
identified in the right kidney, too small to characterise. 4.  An addendum will 
be dictated regarding the non-vascular findings by the Consultant Radiologist.  
Signed: Conner Gillis MDReport Verified Date/Time:  2019 10:11:07     
Electronically signed by: GALA SONG MD on 2019 06:05 PM      

 

                PSA             2019 11:38:00                      

 

   

 

                PROSTATE SPECIFIC ANTIGEN (BEAKER) (test code=844)    0.5 ng/mL       0.0-4.0          





BASIC METABOLIC [id number] 06:41:00* 





                Test Item       Value           Reference Range    Comments

 

                SODIUM (BEAKER) (test code=381)    137 meq/L       136-145          

 

                POTASSIUM (BEAKER) (test code=379)    4.3 meq/L       3.5-5.1          

 

                CHLORIDE (BEAKER) (test code=382)    95 meq/L                   

 

                CO2 (BEAKER) (test code=355)    30 meq/L        22-29            

 

                BLOOD UREA NITROGEN (BEAKER) (test code=354)    30 mg/dL        7-21             

 

                CREATININE (BEAKER) (test code=358)    6.89 mg/dL      0.57-1.25        

 

                GLUCOSE RANDOM (BEAKER) (test code=652)    168 mg/dL                  

 

                CALCIUM (BEAKER) (test code=697)    9.5 mg/dL       8.4-10.2         

 

                EGFR (BEAKER) (test code=1092)    8 mL/min/1.73 sq m                    ESTIMATED GFR IS NOT AS ACCURATE

 AS CREATININE CLEARANCE IN PREDICTING GLOMERULAR FILTRATION RATE. ESTIMATED GFR
IS NOT APPLICABLE FOR DIALYSIS PATIENTS.





CBC W/PLT COUNT & AUTO [id number] 06:21:00* 





                Test Item       Value           Reference Range    Comments

 

                WHITE BLOOD CELL COUNT (BEAKER) (test code=775)    5.8 K/ L        3.5-10.5         

 

                RED BLOOD CELL COUNT (BEAKER) (test code=761)    3.64 M/ L       4.63-6.08        

 

                HEMOGLOBIN (BEAKER) (test code=410)    11.3 GM/DL      13.7-17.5        

 

                HEMATOCRIT (BEAKER) (test code=411)    36.0 %          40.1-51.0        

 

                MEAN CORPUSCULAR VOLUME (BEAKER) (test code=753)    98.9 fL         79.0-92.2        

 

                MEAN CORPUSCULAR HEMOGLOBIN (BEAKER) (test code=751)    31.0 pg         25.7-32.2        

 

                    MEAN CORPUSCULAR HEMOGLOBIN CONC (BEAKER) (test code=752)    31.4 GM/DL          32.3-36.5

                                         

 

                RED CELL DISTRIBUTION WIDTH (BEAKER) (test code=412)    15.7 %          11.6-14.4        

 

                PLATELET COUNT (BEAKER) (test code=756)    200 K/CU MM     150-450          

 

                MEAN PLATELET VOLUME (BEAKER) (test code=754)    10.2 fL         9.4-12.4         

 

                NUCLEATED RED BLOOD CELLS (BEAKER) (test code=413)    0 /100 WBC      0-0              

 

                NEUTROPHILS RELATIVE PERCENT (BEAKER) (test code=429)    55 %                             

 

                LYMPHOCYTES RELATIVE PERCENT (BEAKER) (test code=430)    30 %                             

 

                MONOCYTES RELATIVE PERCENT (BEAKER) (test code=431)    8 %                              

 

                EOSINOPHILS RELATIVE PERCENT (BEAKER) (test code=432)    7 %                              

 

                BASOPHILS RELATIVE PERCENT (BEAKER) (test code=437)    1 %                              

 

                NEUTROPHILS ABSOLUTE COUNT (BEAKER) (test code=670)    3.20 K/ L       1.78-5.38        

 

                LYMPHOCYTES ABSOLUTE COUNT (BEAKER) (test code=414)    1.72 K/ L       1.32-3.57        

 

                MONOCYTES ABSOLUTE COUNT (BEAKER) (test code=415)    0.46 K/ L       0.30-0.82        

 

                EOSINOPHILS ABSOLUTE COUNT (BEAKER) (test code=416)    0.38 K/ L       0.04-0.54        

 

                BASOPHILS ABSOLUTE COUNT (BEAKER) (test code=417)    0.05 K/ L       0.01-0.08        

 

                IMMATURE GRANULOCYTES-RELATIVE PERCENT (BEAKER) (test code=2801)    0 %             0-1              





FOOT LEFT AP   [id number] 10:17:00                                           
                                          [place] [address]     
Patient Name: JOVITA IRWIN                                   MR #: [id number]    
                : 1955                                   Age/Sex: 63/M 
Acct #: [id number]                              Req #: [id number]  Adm 
Physician: NORA BRENNER MD                                        Ordered by: 
DEEJAY HOPE DPM                            Report #: [id number]        Location: 
MED/SURG2                               Room/Bed: [facility]               
_____________________________________________________
______________________________________________    Procedure: [id number] DX/FOOT L
EFT AP   LAT  Exam Date: 19                            Exam Time: 920    
                                         REPORT STATUS: Signed    Exam:Left foot
radiographs-2 views       History: Gangrene of left toe.      Comparison: Left 
foot radiographs 2019.      Findings:    There has been interval amputation 
of the middle and distal phalanges of the   second toe, compared to radiograph 
on 2019. Punctate gas and soft tissue   edema is present in the proximal asp
ect of the second toe.      No radiographic evidence of osteomyelitis. No eviden
ce of acute fracture or   malalignment. The Lisfranc alignment is unremarkable. 
There are diffuse   vascular calcifications. Scattered intertarsal mild degenera
tive changes.       Impression:    Status post interval partial amputation of th
e left second toe. Edema and small   amount of gas in the proximal second toe li
brooke related to recent postoperative   changes.      No definite radiographic ev
idence of osteomyelitis.       Signed by: Dr. Bernie King MD on 2019 10:23 
AM        Dictated By: BERNIE KING MD  Electronically Signed By: BERNIE KING MD on
19 1023  Transcribed By: LAMIN on 19 1023       COPY TO:   JUNAID HOPE DPM        FOOT LEFT [id number] 08:10:00                           
                                                          [place] [address] [place] [address]      Patient Name: JOVITA IRWIN                            
      MR #: [id number]                     : 1955                      
            Age/Sex: 63/M  Acct #: [id number]                              Req
#: [id number]  Adm Physician: NORA BRENNER MD                                     
  Ordered by: DEEJAY HOPE DPM                            Report #: [id number]    
   Location: [facility]                               Room/Bed: [facility]            
  _____________________________________________________
______________________________________________    Procedure: [id number] DX/FOOT L
EFT COMPLETE  Exam Date: 19                            Exam Time: 0740    
                                         REPORT STATUS: Signed    Exam: Left fo
ot series; 3 views dated 2019 at 7:47 AM      History: Gangrene of the left 
second digit      Comparison: None available      Findings: Soft tissue ulcer an
d wound of the distal second digit is noted.   There is also an erosion of the d
istal phalanx of the second digit. Findings   compatible with osteomyelitis. Dif
fuse vascular calcification is also present.         Impression:      Soft tissu
e ulcer and bony erosion of the distal phalanx of the second digit.      Signed 
by: Dr. Sukhdev Mujica DO on 2019 8:11 AM        Dictated By: SUKHDEV MUJICA DO  Electronically Signed By: SUKHDEV MUJICA DO on 19  Transcribed By:
LAMIN on 19       COPY TO:   DEEJAY HOPE DPM        FOOT RIGHT 
[id number] 14:13:00    [place] [address]      Patient Name: JOVITA IRWIN   MR 
#: [id number]    : 1955 Age/Sex: 63/M  Acct #: [id number] Req #: [id number]
[id number]  [facility] Physician:     Ordered by: AMANDA RENTERIA MD  Report #: [id number]   
Location: [facility]  Room/Bed:     
_______________________________________________________________________________
____________________    Procedure: [id number] DX/FOOT RIGHT COMPLETE  Exam Date: 
18                            Exam Time:        REPORT STATUS: Signed 
     Exam: Right foot series, 3 views.       Clinical History: Pressure ulcer in
right heel      Comparison: None.      Findings: 3 views of the right foot. Th
ere is normal bone mineralization.   Negative for acute, displaced fracture or d
islocation. No cortical erosion or   destruction.   Degenerative changes in the 
hindfoot/midfoot joints.   Extensive vascular calcifications.   Soft tissue defe
ct in the posterior aspect of the heel, likely representing the   known ulcerati
on.   Moderate anterior calcaneal enthesophyte.      Impression:      1. Soft ti
ssue defect in the posterior aspect of the heel likely represents the   known ul
ceration. Adjacent bony structures are intact. No cortical destruction   or eros
ion to suggest osteomyelitis.   2. Extensive vascular calcifications.         Si
gned by: Dr. Jeremiah Walker M.D. on 2018 2:15 PM        Dictated By: IBAN WALKER MD  Electronically Signed By: JEREMIAH WALKER MD on 18 1415  T
ranscribed By: LAMIN on 18 1415       COPY TO:   AMANDA RENTERIA MD        
[id number] 19:51:00* 





                Test Item       Value           Reference Range    Comments

 

                PROSTATE SPECIFIC ANTIGEN (BEAKER) (test code=844)    0.6 ng/mL       0.0-4.0          





PET, CARDIAC PERFUSION MULTIPLE STUDIES, REST AND [id number] 16:23:00
Reason for Exam:->mac, esrd, cadFINAL REPORT PATIENT ID:   [id number] PROCEDURE:  
  Rest/Stress MYOCARDIAL PERFUSION PET with regadenoson\XA9\ CPT CODE:     03769
INDICATION:     Define extent, severity of known CAD, cardiovascular evaluation 
prior to renal transplantation HISTORY:     Cardiac risk factors: ESRD, 
diabetes, hypertension, obesity, peripheral vascular disease. Other 
cardiovascular history: CAD with prior PCI. Recent cardiac symptoms: None. 
Current cardiovascular-related medications: Aspirin, clopidogrel, losartan. 
PROTOCOL:     Limited low-dose CT imaging was performed for attenuation 
correction. 40.1 mCi of Rb-82 chloride was injected iv at rest, and gated PET 
(positron emission tomography) images were obtained. Subsequently, 40.1 mCi of 
Rb-82 chloride was injected iv at expected peak pharmacologic effect, and gated 
PET images were obtained.  PRELIMINARY STRESS TEST DATA FROM NONINVASIVE 
CARDIOLOGY:     Pharmacologic stress was by 10-second iv infusion of 0.4 mg of 
regadenoson. Radiotracer was injected 30 seconds after start of stress. Heart 
rate was 64 beats/min at rest and 75 beats/min (47% of MPHR) at tracer injection
. BP was 132/88 mmHg at rest and 145/89 mmHg at tracer injection. Stress was sto
pped for predetermined endpoint. The patient experienced no symptoms; treatment 
was not required. Preliminary ECG evaluation revealed sinus rhythm at rest and n
o ischemic changes with stress. (Final ECG interpretation and other stress and m
onitoring data are reported separately by Cardiology.)  IMAGING FINDINGS:     St
udy quality is good. Images obtained after rest and stress injections show gilberto
l LV activity. LV and RV volumes appear normal. Gated images obtained at rest an
d with stress show normal LV wall motion and thickening. LVEF at rest is 61%. LV
EF at stress is 67%. IMPRESSION:     1. Normal study.  2. Appropriate pharmacolo
gic stress.  3. Normal myocardial perfusion.  4. Normal resting LV function. No 
deterioration of function is noted with pharmacologic stress.  5. Normal extraca
rdiac tracer distribution.  6. Compared to previous [facility] study report on 10/13/
2015, the previously mentioned anterolateral defect is no longer seen.   NONINVA
SIVE RISK STRATIFICATION: The above findings are considered low risk (<1% annual
mortality rate) based on the following criterion:- Normal or small myocardial 
perfusion defect at rest or with stress(JACC. 2012;59(9):857-81.) Signed: Jameson Tirado Verified Date/Time:  2018 16:23:27 Reading Location: [address] [facility] Room   Electronically signed by: JAMESON TIRADO M.D. on 2018 04:23 PM CRSMTCH [id number] 09:31:00* 





                Test Item       Value           Reference Range    Comments

 

                CRSMTCH LRD RESULT (BEAKER) (test code=2491)    See Scanned Report                     





CRSMTCH T+B [id number] 09:31:00* 





                Test Item       Value           Reference Range    Comments

 

                CRSMTCH T+B CELLS RESULT (BEAKER) (test code=2492)    See Scanned Report                     





CRSMTCH T+B [id number] 09:30:00* 





                Test Item       Value           Reference Range    Comments

 

                CRSMTCH T+B CELLS RESULT (BEAKER) (test code=2492)    See Scanned Report                     





CRSMTCH FLOW [id number] 09:30:00* 





                Test Item       Value           Reference Range    Comments

 

                CRSMTCH FLOW ADDL RESULT (BEAKER) (test code=2681)    See Scanned Report                     





CRSMTCH FLOW [id number] 09:29:00* 





                Test Item       Value           Reference Range    Comments

 

                CRSMTCH FLOW ADDL RESULT (BEAKER) (test code=2681)    See Scanned Report                     





CRSMTCH [id number] 09:29:00* 





                Test Item       Value           Reference Range    Comments

 

                CRSMTCH FLOW RESULT (BEAKER) (test code=2584)                                     





CRSMTCH [id number] 09:29:00* 





                Test Item       Value           Reference Range    Comments

 

                CRSMTCH LRD RESULT (BEAKER) (test code=2491)    See Scanned Report                     





CRSMTCH FLOW [id number] 11:47:00* 





                Test Item       Value           Reference Range    Comments

 

                CRSMTCH FLOW ADDL RESULT (BEAKER) (test code=2681)    See Scanned Report                     





[id number] 11:46:00* 





                Test Item       Value           Reference Range    Comments

 

                CRSMTCH-HIST RESULT (BEAKER) (test code=2488)    See Scanned Report                     





[id number] 11:45:00* 





                Test Item       Value           Reference Range    Comments

 

                CRSMTCH-PRETRANS RESULT (BEAKER) (test code=2490)    See Scanned Report                     





CRSMTCH FLOW [id number] 11:45:00* 





                Test Item       Value           Reference Range    Comments

 

                CRSMTCH FLOW ADDL RESULT (BEAKER) (test code=2681)    See Scanned Report                     





CRSMTCH [id number] 11:45:00* 





                Test Item       Value           Reference Range    Comments

 

                CRSMTCH FLOW RESULT (BEAKER) (test code=2584)                                     





[id number] 11:45:00* 





                Test Item       Value           Reference Range    Comments

 

                CRSMTCH-CURRENT RESULT (BEAKER) (test code=2489)    See Scanned Report                     





CRSMTCH FLOW [id number] 07:34:00* 





                Test Item       Value           Reference Range    Comments

 

                CRSMTCH FLOW ADDL RESULT (BEAKER) (test code=2681)    See Scanned Report                     





CRSMTCH FLOW [id number] 07:32:00* 





                Test Item       Value           Reference Range    Comments

 

                CRSMTCH FLOW ADDL RESULT (BEAKER) (test code=2681)    See Scanned Report                     





CRSMTCH [id number] 07:32:00* 





                Test Item       Value           Reference Range    Comments

 

                CRSMTCH FLOW RESULT (BEAKER) (test code=2584)                                     





[id number] 07:32:00* 





                Test Item       Value           Reference Range    Comments

 

                CRSMTCH-CURRENT RESULT (BEAKER) (test code=2489)    See Scanned Report                     





[id number] 07:32:00* 





                Test Item       Value           Reference Range    Comments

 

                CRSMTCH-HIST RESULT (BEAKER) (test code=2488)    See Scanned Report                     





[id number] 07:31:00* 





                Test Item       Value           Reference Range    Comments

 

                CRSMTCH-PRETRANS RESULT (BEAKER) (test code=2490)    See Scanned Report                     





AB SPECIFICITY CLASS  11:38:00* 





                Test Item       Value           Reference Range    Comments

 

                AB SPECIFICITY CLASS I (BEAKER) (test code=2429)                                     

 

                DATE OF SERUM (BEAKER) (test code=)    [id number]                           

 

                SERUM # (BEAKER) (test code=2290)    [id number]                           





AB SPECIFICITY CLASS  11:37:00* 





                Test Item       Value           Reference Range    Comments

 

                AB SPECIFICITY CLASS I (BEAKER) (test code=2429)                                     

 

                DATE OF SERUM (BEAKER) (test code=228)    [id number]                           

 

                SERUM # (BEAKER) (test code=2290)    [id number]                           





AB SPECIFICITY CLASS -13-00 11:10:00* 





                Test Item       Value           Reference Range    Comments

 

                AB SPECIFICITY CLASS I (BEAKER) (test code=2429)                                     

 

                DATE OF SERUM (BEAKER) (test code=2289)    [id number]                           

 

                SERUM # (BEAKER) (test code=2290)    [id number]                           





[id number] 06:23:00* 





                Test Item       Value           Reference Range    Comments

 

                CRSMTCH-HIST RESULT (BEAKER) (test code=2488)    See Scanned Report                     





CRSMTCH FLOW [id number] 06:23:00* 





                Test Item       Value           Reference Range    Comments

 

                CRSMTCH FLOW ADDL RESULT (BEAKER) (test code=2681)    See Scanned Report                     





[id number] 06:22:00* 





                Test Item       Value           Reference Range    Comments

 

                CRSMTCH-PRETRANS RESULT (BEAKER) (test code=2490)    See Scanned Report                     





CRSMTCH FLOW [id number] 06:22:00* 





                Test Item       Value           Reference Range    Comments

 

                CRSMTCH FLOW ADDL RESULT (BEAKER) (test code=2681)    See Scanned Report                     





CRSMTCH [id number] 06:22:00* 





                Test Item       Value           Reference Range    Comments

 

                CRSMTCH FLOW RESULT (BEAKER) (test code=2584)                                     





[id number] 06:22:00* 





                Test Item       Value           Reference Range    Comments

 

                CRSMTCH-CURRENT RESULT (BEAKER) (test code=2489)    See Scanned Report                     





FLOW PRA CLASS I AND [id number] 08:58:00* 





                Test Item       Value           Reference Range    Comments

 

                DATE OF SERUM (BEAKER) (test code=2289)    [id number]                           

 

                SERUM # (BEAKER) (test code=2290)    [id number]                           

 

                FLOW PRA CLASS I AND II (test code=2421)    See Scanned Report                     





FLOW PRA CLASS I AND [id number] 13:54:00* 





                Test Item       Value           Reference Range    Comments

 

                DATE OF SERUM (BEAKER) (test code=2289)    [id number]                           

 

                SERUM # (BEAKER) (test code=2290)    [id number]                           

 

                FLOW PRA CLASS I AND II (test code=2421)    See Scanned Report                     





FLOW PRA CLASS I AND [id number] 09:52:00* 





                Test Item       Value           Reference Range    Comments

 

                DATE OF SERUM (BEAKER) (test code=2289)    [id number]                           

 

                SERUM # (BEAKER) (test code=2290)    [id number]                           

 

                FLOW PRA CLASS I AND II (test code=2421)    See Scanned Report                     





AB SPECIFICITY CLASS -11-15 11:24:00* 





                Test Item       Value           Reference Range    Comments

 

                DATE OF SERUM (BEAKER) (test code=2289)    [id number]                           

 

                SERUM # (BEAKER) (test code=2290)    [id number]                           

 

                AB SPECIFICITY CLASS I (BEAKER) (test code=2429)    See Scanned Report                     





FLOW PRA CLASS I AND [id number] 15:12:00* 





                Test Item       Value           Reference Range    Comments

 

                DATE OF SERUM (BEAKER) (test code=2289)    [id number]                           

 

                SERUM # (BEAKER) (test code=2290)    [id number]                           

 

                FLOW PRA CLASS I AND II (test code=2421)    See Scanned Report                     





CRSMTCH FLOW ADDL2017-10-31 11:44:00* 





                Test Item       Value           Reference Range    Comments

 

                CRSMTCH FLOW ADDL RESULT (BEAKER) (test code=2681)    See Scanned Report                     





CRSMTCH FLOW ADDL2017-10-31 11:43:00* 





                Test Item       Value           Reference Range    Comments

 

                CRSMTCH FLOW ADDL RESULT (BEAKER) (test code=2681)    See Scanned Report                     





CRSMTCH FLOW2017-10-31 11:43:00* 





                Test Item       Value           Reference Range    Comments

 

                CRSMTCH FLOW RESULT (BEAKER) (test code=2584)    See Scanned Report                     





CRSMTCH-CURRENT2017-10-31 11:43:00* 





                Test Item       Value           Reference Range    Comments

 

                CRSMTCH-CURRENT RESULT (BEAKER) (test code=2489)    See Scanned Report                     





CRSMTCH-HIST2017-10-31 11:43:00* 





                Test Item       Value           Reference Range    Comments

 

                CRSMTCH-HIST RESULT (BEAKER) (test code=2488)    See Scanned Report                     





CRSMTCH-PRETRANS2017-10-31 11:43:00* 





                Test Item       Value           Reference Range    Comments

 

                CRSMTCH-PRETRANS RESULT (BEAKER) (test code=2490)    See Scanned Report                     





CRSMTCH FLOW ADDL2017-10-25 14:31:00* 





                Test Item       Value           Reference Range    Comments

 

                CRSMTCH FLOW ADDL RESULT (BEAKER) (test code=2681)    See Scanned Report                     





CRSMTCH FLOW2017-10-25 14:31:00* 





                Test Item       Value           Reference Range    Comments

 

                CRSMTCH FLOW RESULT (BEAKER) (test code=2584)    See Scanned Report                     





CRSMTCH-CURRENT2017-10-25 14:31:00* 





                Test Item       Value           Reference Range    Comments

 

                CRSMTCH-CURRENT RESULT (BEAKER) (test code=2489)    See Scanned Report                     





CRSMTCH-HIST2017-10-25 14:31:00* 





                Test Item       Value           Reference Range    Comments

 

                CRSMTCH-HIST RESULT (BEAKER) (test code=2488)    See Scanned Report                     





CRSMTCH FLOW ADDL2017-10-25 14:31:00* 





                Test Item       Value           Reference Range    Comments

 

                CRSMTCH FLOW ADDL RESULT (BEAKER) (test code=2681)    See Scanned Report                     





CRSMTCH-PRETRANS2017-10-25 14:30:00* 





                Test Item       Value           Reference Range    Comments

 

                CRSMTCH-PRETRANS RESULT (BEAKER) (test code=2490)    See Scanned Report                     





CRSMTCH FLOW ADDL2017-10-18 15:58:00* 





                Test Item       Value           Reference Range    Comments

 

                CRSMTCH FLOW ADDL RESULT (BEAKER) (test code=2681)    See Scanned Report                     





CRSMTCH-PRETRANS2017-10-18 15:57:00* 





                Test Item       Value           Reference Range    Comments

 

                CRSMTCH-PRETRANS RESULT (BEAKER) (test code=2490)    See Scanned Report                     





CRSMTCH FLOW ADDL2017-10-18 15:57:00* 





                Test Item       Value           Reference Range    Comments

 

                CRSMTCH FLOW ADDL RESULT (BEAKER) (test code=2681)    See Scanned Report                     





CRSMTCH FLOW2017-10-18 15:57:00* 





                Test Item       Value           Reference Range    Comments

 

                CRSMTCH FLOW RESULT (BEAKER) (test code=2584)    See Scanned Report                     





CRSMTCH-CURRENT2017-10-18 15:57:00* 





                Test Item       Value           Reference Range    Comments

 

                CRSMTCH-CURRENT RESULT (BEAKER) (test code=2489)    See Scanned Report                     





CRSMTCH-HIST2017-10-18 15:57:00* 





                Test Item       Value           Reference Range    Comments

 

                CRSMTCH-HIST RESULT (BEAKER) (test code=2488)    See Scanned Report                     





AB SPECIFICITY CLASS -12-53 11:56:00* 





                Test Item       Value           Reference Range    Comments

 

                DATE OF SERUM (BEAKER) (test code=2289)    [id number]                           

 

                SERUM # (BEAKER) (test code=2290)    [id number]                           

 

                AB SPECIFICITY CLASS I (BEAKER) (test code=2429)    See Scanned Report                     





CRSMTCH FLOW ADDL2017-10-10 15:50:00* 





                Test Item       Value           Reference Range    Comments

 

                CRSMTCH FLOW ADDL RESULT (BEAKER) (test code=2681)    See Scanned Report                     





CRSMTCH FLOW ADDL2017-10-10 15:49:00* 





                Test Item       Value           Reference Range    Comments

 

                CRSMTCH FLOW ADDL RESULT (BEAKER) (test code=2681)    See Scanned Report                     





CRSMTCH FLOW2017-10-10 15:49:00* 





                Test Item       Value           Reference Range    Comments

 

                CRSMTCH FLOW RESULT (BEAKER) (test code=2584)    See Scanned Report                     





CRSMTCH-CURRENT2017-10-10 15:49:00* 





                Test Item       Value           Reference Range    Comments

 

                CRSMTCH-CURRENT RESULT (BEAKER) (test code=2489)    See Scanned Report                     





CRSMTCH-HIST2017-10-10 15:49:00* 





                Test Item       Value           Reference Range    Comments

 

                CRSMTCH-HIST RESULT (BEAKER) (test code=2488)    See Scanned Report                     





CRSMTCH-PRETRANS2017-10-10 15:49:00* 





                Test Item       Value           Reference Range    Comments

 

                CRSMTCH-PRETRANS RESULT (BEAKER) (test code=2490)    See Scanned Report                     





FLOW PRA CLASS I AND II2017-10-10 15:43:00* 





                Test Item       Value           Reference Range    Comments

 

                DATE OF SERUM (BEAKER) (test code=2289)    [id number]                           

 

                SERUM # (BEAKER) (test code=2290)    [id number]                           

 

                FLOW PRA CLASS I AND II (test code=2421)    See Scanned Report                     





CRSMTCH FLOW [id number] 13:00:00* 





                Test Item       Value           Reference Range    Comments

 

                CRSMTCH FLOW ADDL RESULT (BEAKER) (test code=2681)    See Scanned Report                     





CRSMTCH FLOW [id number] 12:59:00* 





                Test Item       Value           Reference Range    Comments

 

                CRSMTCH FLOW ADDL RESULT (BEAKER) (test code=2681)    See Scanned Report                     





CRSMTCH [id number] 12:59:00* 





                Test Item       Value           Reference Range    Comments

 

                CRSMTCH FLOW RESULT (BEAKER) (test code=2584)    See Scanned Report                     





[id number] 12:59:00* 





                Test Item       Value           Reference Range    Comments

 

                CRSMTCH-CURRENT RESULT (BEAKER) (test code=2489)    See Scanned Report                     





[id number] 12:59:00* 





                Test Item       Value           Reference Range    Comments

 

                CRSMTCH-HIST RESULT (BEAKER) (test code=2488)    See Scanned Report                     





[id number] 12:59:00* 





                Test Item       Value           Reference Range    Comments

 

                CRSMTCH-PRETRANS RESULT (BEAKER) (test code=2490)    See Scanned Report                     





[id number] 12:45:00* 





                Test Item       Value           Reference Range    Comments

 

                CRSMTCH-HIST RESULT (BEAKER) (test code=2488)    See Scanned Report                     





[id number] 12:45:00* 





                Test Item       Value           Reference Range    Comments

 

                CRSMTCH-PRETRANS RESULT (BEAKER) (test code=2490)    See Scanned Report                     





CRSMTCH FLOW [id number] 12:45:00* 





                Test Item       Value           Reference Range    Comments

 

                CRSMTCH FLOW ADDL RESULT (BEAKER) (test code=2681)    See Scanned Report                     





CRSMTCH FLOW [id number] 12:44:00* 





                Test Item       Value           Reference Range    Comments

 

                CRSMTCH FLOW ADDL RESULT (BEAKER) (test code=2681)    See Scanned Report                     





CRSMTCH [id number] 12:44:00* 





                Test Item       Value           Reference Range    Comments

 

                CRSMTCH FLOW RESULT (BEAKER) (test code=2584)    See Scanned Report                     





[id number] 12:44:00* 





                Test Item       Value           Reference Range    Comments

 

                CRSMTCH-CURRENT RESULT (BEAKER) (test code=2489)    See Scanned Report                     





AB SPECIFICITY CLASS  11:29:00* 





                Test Item       Value           Reference Range    Comments

 

                DATE OF SERUM (BEAKER) (test code=2289)    [id number]                           

 

                SERUM # (BEAKER) (test code=2290)    [id number]                           

 

                AB SPECIFICITY CLASS I (BEAKER) (test code=2429)    See Scanned Report                     





AB SPECIFICITY CLASS -63-67 15:34:00* 





                Test Item       Value           Reference Range    Comments

 

                DATE OF SERUM (BEAKER) (test code=2289)    [id number]                           

 

                SERUM # (BEAKER) (test code=2290)    [id number]                           

 

                AB SPECIFICITY CLASS I (BEAKER) (test code=2429)    See Scanned Report                     





CRSMTCH FLOW [id number] 10:54:00* 





                Test Item       Value           Reference Range    Comments

 

                CRSMTCH FLOW ADDL RESULT (BEAKER) (test code=2681)    See Scanned Report                     





CRSMTCH [id number] 10:54:00* 





                Test Item       Value           Reference Range    Comments

 

                CRSMTCH FLOW RESULT (BEAKER) (test code=2584)    See Scanned Report                     





[id number] 10:54:00* 





                Test Item       Value           Reference Range    Comments

 

                CRSMTCH-CURRENT RESULT (BEAKER) (test code=2489)    See Scanned Report                     





[id number] 10:54:00* 





                Test Item       Value           Reference Range    Comments

 

                CRSMTCH-HIST RESULT (BEAKER) (test code=2488)    See Scanned Report                     





[id number] 10:54:00* 





                Test Item       Value           Reference Range    Comments

 

                CRSMTCH-PRETRANS RESULT (BEAKER) (test code=2490)    See Scanned Report                     





FLOW PRA CLASS I AND [id number] 10:07:00* 





                Test Item       Value           Reference Range    Comments

 

                DATE OF SERUM (BEAKER) (test code=2289)    [id number]                           

 

                SERUM # (BEAKER) (test code=2290)    [id number]                           

 

                FLOW PRA CLASS I AND II (test code=2421)    See Scanned Report                     





FLOW PRA CLASS I AND [id number] 09:56:00* 





                Test Item       Value           Reference Range    Comments

 

                DATE OF SERUM (BEAKER) (test code=2289)    [id number]                           

 

                SERUM # (BEAKER) (test code=2290)    [id number]                           

 

                FLOW PRA CLASS I AND II (test code=2421)    See Scanned Report                     





FLOW PRA CLASS I AND [id number] 12:49:00* 





                Test Item       Value           Reference Range    Comments

 

                DATE OF SERUM (BEAKER) (test code=2289)    [id number]                           

 

                SERUM # (BEAKER) (test code=2290)    [id number]                           

 

                FLOW PRA CLASS I AND II (test code=2421)    See Scanned Report                     





AB SPECIFICITY CLASS -07-10 10:56:00* 





                Test Item       Value           Reference Range    Comments

 

                DATE OF SERUM (BEAKER) (test code=2289)    [id number]                           

 

                SERUM # (BEAKER) (test code=2290)    [id number]                           

 

                AB SPECIFICITY CLASS I (BEAKER) (test code=2429)    See Scanned Report                     





[id number] 15:14:00* 





                Test Item       Value           Reference Range    Comments

 

                CRSMTCH-PRETRANS RESULT (BEAKER) (test code=2490)    See Scanned Report                     





CRSMTCH FLOW [id number] 15:14:00* 





                Test Item       Value           Reference Range    Comments

 

                CRSMTCH FLOW ADDL RESULT (BEAKER) (test code=2681)    See Scanned Report                     





CRSMTCH [id number] 15:14:00* 





                Test Item       Value           Reference Range    Comments

 

                CRSMTCH FLOW RESULT (BEAKER) (test code=2584)    See Scanned Report                     





[id number] 15:14:00* 





                Test Item       Value           Reference Range    Comments

 

                CRSMTCH-CURRENT RESULT (BEAKER) (test code=2489)    See Scanned Report                     





[id number] 15:14:00* 





                Test Item       Value           Reference Range    Comments

 

                CRSMTCH-HIST RESULT (BEAKER) (test code=2488)    See Scanned Report                     





AB SPECIFICITY CLASS  11:54:00* 





                Test Item       Value           Reference Range    Comments

 

                DATE OF SERUM (BEAKER) (test code=2289)    [id number]                           

 

                SERUM # (BEAKER) (test code=2290)    [id number]                           

 

                AB SPECIFICITY CLASS I (BEAKER) (test code=2429)    See Scanned Report                     





FLOW PRA CLASS I AND [id number] 10:51:00* 





                Test Item       Value           Reference Range    Comments

 

                DATE OF SERUM (BEAKER) (test code=2289)    [id number]                           

 

                SERUM # (BEAKER) (test code=2290)    [id number]                           

 

                FLOW PRA CLASS I AND II (test code=2421)    See Scanned Report                     





CRSMTCH FLOW [id number] 10:18:00* 





                Test Item       Value           Reference Range    Comments

 

                CRSMTCH FLOW ADDL RESULT (BEAKER) (test code=2681)    See Scanned Report                     





CRSMTCH [id number] 10:17:00* 





                Test Item       Value           Reference Range    Comments

 

                CRSMTCH FLOW RESULT (BEAKER) (test code=2584)    See Scanned Report                     





CRSMTCH FLOW [id number] 10:17:00* 





                Test Item       Value           Reference Range    Comments

 

                CRSMTCH FLOW ADDL RESULT (BEAKER) (test code=2681)    See Scanned Report                     





[id number] 10:12:00* 





                Test Item       Value           Reference Range    Comments

 

                CRSMTCH-PRETRANS RESULT (BEAKER) (test code=2490)    See Scanned Report                     





[id number] 10:12:00* 





                Test Item       Value           Reference Range    Comments

 

                CRSMTCH-CURRENT RESULT (BEAKER) (test code=2489)    See Scanned Report                     





[id number] 10:12:00* 





                Test Item       Value           Reference Range    Comments

 

                CRSMTCH-HIST RESULT (BEAKER) (test code=2488)    See Scanned Report                     





FLOW PRA CLASS I AND [id number] 11:31:00* 





                Test Item       Value           Reference Range    Comments

 

                DATE OF SERUM (BEAKER) (test code=)    [id number]                           

 

                SERUM # (BEAKER) (test code=2290)    [id number]                           

 

                FLOW PRA CLASS I AND II (test code=2421)    See Scanned Report                     





POTASSIUM-STAT [id number] 06:44:00* 





                Test Item       Value           Reference Range    Comments

 

                POTASSIUM (BEAKER) (test code=379)    3.9 meq/L       3.6-5.5          





POTASSIUM-STAT [id number] 06:29:00* 





                Test Item       Value           Reference Range    Comments

 

                POTASSIUM (BEAKER) (test code=379)    6.3 meq/L       3.6-5.5          





GLUCOSE-STAT [id number] 06:17:00* 





                Test Item       Value           Reference Range    Comments

 

                GLUCOSE RANDOM (BEAKER) (test code=652)    229 mg/dL                  





AB SPECIFICITY CLASS  12:22:00* 





                Test Item       Value           Reference Range    Comments

 

                DATE OF SERUM (BEAKER) (test code=228)    [id number]                           

 

                SERUM # (BEAKER) (test code=2290)    [id number]                           

 

                AB SPECIFICITY CLASS I (BEAKER) (test code=2429)    See Scanned Report                     





AB SPECIFICITY CLASS  12:02:00* 





                Test Item       Value           Reference Range    Comments

 

                DATE OF SERUM (BEAKER) (test code=2289)    [id number]                           

 

                SERUM # (BEAKER) (test code=2290)    [id number]                           

 

                AB SPECIFICITY CLASS I (BEAKER) (test code=2429)    See Scanned Report                     





FLOW PRA CLASS I AND [id number] 12:31:00* 





                Test Item       Value           Reference Range    Comments

 

                DATE OF SERUM (BEAKER) (test code=2289)    [id number]                           

 

                SERUM # (BEAKER) (test code=2290)    [id number]                           

 

                FLOW PRA CLASS I AND II (test code=2421)    See Scanned Report                     





AB SPECIFICITY CLASS -[id number] 07:26:00* 





                Test Item       Value           Reference Range    Comments

 

                DATE OF SERUM (BEAKER) (test code=2289)    [id number]                           

 

                SERUM # (BEAKER) (test code=2290)    [id number]                            

 

                AB SPECIFICITY CLASS I (BEAKER) (test code=2429)    See Scanned Report

## 2021-09-30 ENCOUNTER — HOSPITAL ENCOUNTER (INPATIENT)
Dept: HOSPITAL 88 - FSED | Age: 66
LOS: 3 days | Discharge: HOME | DRG: 291 | End: 2021-10-03
Attending: INTERNAL MEDICINE | Admitting: INTERNAL MEDICINE
Payer: MEDICARE

## 2021-09-30 VITALS — WEIGHT: 235 LBS | HEIGHT: 71 IN | BODY MASS INDEX: 32.9 KG/M2

## 2021-09-30 VITALS — SYSTOLIC BLOOD PRESSURE: 132 MMHG | DIASTOLIC BLOOD PRESSURE: 59 MMHG

## 2021-09-30 DIAGNOSIS — Z91.048: ICD-10-CM

## 2021-09-30 DIAGNOSIS — Z99.2: ICD-10-CM

## 2021-09-30 DIAGNOSIS — I25.10: ICD-10-CM

## 2021-09-30 DIAGNOSIS — Z94.4: ICD-10-CM

## 2021-09-30 DIAGNOSIS — R59.9: ICD-10-CM

## 2021-09-30 DIAGNOSIS — I50.33: ICD-10-CM

## 2021-09-30 DIAGNOSIS — Z20.822: ICD-10-CM

## 2021-09-30 DIAGNOSIS — E66.9: ICD-10-CM

## 2021-09-30 DIAGNOSIS — Z88.5: ICD-10-CM

## 2021-09-30 DIAGNOSIS — N18.6: ICD-10-CM

## 2021-09-30 DIAGNOSIS — N40.0: ICD-10-CM

## 2021-09-30 DIAGNOSIS — Z79.82: ICD-10-CM

## 2021-09-30 DIAGNOSIS — Z95.5: ICD-10-CM

## 2021-09-30 DIAGNOSIS — E11.22: ICD-10-CM

## 2021-09-30 DIAGNOSIS — Z88.6: ICD-10-CM

## 2021-09-30 DIAGNOSIS — R09.02: ICD-10-CM

## 2021-09-30 DIAGNOSIS — I13.2: Primary | ICD-10-CM

## 2021-09-30 DIAGNOSIS — Z79.4: ICD-10-CM

## 2021-09-30 LAB
CK MB SERPL-MCNC: 1.4 NG/ML (ref 0–5)
CK SERPL-CCNC: 81 IU/L (ref 30–200)

## 2021-09-30 PROCEDURE — 84100 ASSAY OF PHOSPHORUS: CPT

## 2021-09-30 PROCEDURE — 93306 TTE W/DOPPLER COMPLETE: CPT

## 2021-09-30 PROCEDURE — 82948 REAGENT STRIP/BLOOD GLUCOSE: CPT

## 2021-09-30 PROCEDURE — 93005 ELECTROCARDIOGRAM TRACING: CPT

## 2021-09-30 PROCEDURE — 85379 FIBRIN DEGRADATION QUANT: CPT

## 2021-09-30 PROCEDURE — 85025 COMPLETE CBC W/AUTO DIFF WBC: CPT

## 2021-09-30 PROCEDURE — 82553 CREATINE MB FRACTION: CPT

## 2021-09-30 PROCEDURE — 83880 ASSAY OF NATRIURETIC PEPTIDE: CPT

## 2021-09-30 PROCEDURE — 99284 EMERGENCY DEPT VISIT MOD MDM: CPT

## 2021-09-30 PROCEDURE — 83735 ASSAY OF MAGNESIUM: CPT

## 2021-09-30 PROCEDURE — 96374 THER/PROPH/DIAG INJ IV PUSH: CPT

## 2021-09-30 PROCEDURE — 36415 COLL VENOUS BLD VENIPUNCTURE: CPT

## 2021-09-30 PROCEDURE — 80076 HEPATIC FUNCTION PANEL: CPT

## 2021-09-30 PROCEDURE — 71260 CT THORAX DX C+: CPT

## 2021-09-30 PROCEDURE — 83036 HEMOGLOBIN GLYCOSYLATED A1C: CPT

## 2021-09-30 PROCEDURE — 82550 ASSAY OF CK (CPK): CPT

## 2021-09-30 PROCEDURE — 96375 TX/PRO/DX INJ NEW DRUG ADDON: CPT

## 2021-09-30 PROCEDURE — 80053 COMPREHEN METABOLIC PANEL: CPT

## 2021-09-30 PROCEDURE — 84484 ASSAY OF TROPONIN QUANT: CPT

## 2021-09-30 PROCEDURE — 80048 BASIC METABOLIC PNL TOTAL CA: CPT

## 2021-09-30 PROCEDURE — 80061 LIPID PANEL: CPT

## 2021-10-01 VITALS — SYSTOLIC BLOOD PRESSURE: 163 MMHG | DIASTOLIC BLOOD PRESSURE: 65 MMHG

## 2021-10-01 VITALS — DIASTOLIC BLOOD PRESSURE: 83 MMHG | SYSTOLIC BLOOD PRESSURE: 197 MMHG

## 2021-10-01 VITALS — SYSTOLIC BLOOD PRESSURE: 158 MMHG | DIASTOLIC BLOOD PRESSURE: 57 MMHG

## 2021-10-01 VITALS — SYSTOLIC BLOOD PRESSURE: 197 MMHG | DIASTOLIC BLOOD PRESSURE: 68 MMHG

## 2021-10-01 VITALS — DIASTOLIC BLOOD PRESSURE: 59 MMHG | SYSTOLIC BLOOD PRESSURE: 132 MMHG

## 2021-10-01 VITALS — SYSTOLIC BLOOD PRESSURE: 164 MMHG | DIASTOLIC BLOOD PRESSURE: 58 MMHG

## 2021-10-01 VITALS — SYSTOLIC BLOOD PRESSURE: 190 MMHG | DIASTOLIC BLOOD PRESSURE: 73 MMHG

## 2021-10-01 VITALS — SYSTOLIC BLOOD PRESSURE: 185 MMHG | DIASTOLIC BLOOD PRESSURE: 59 MMHG

## 2021-10-01 VITALS — DIASTOLIC BLOOD PRESSURE: 58 MMHG | SYSTOLIC BLOOD PRESSURE: 164 MMHG

## 2021-10-01 VITALS — DIASTOLIC BLOOD PRESSURE: 68 MMHG | SYSTOLIC BLOOD PRESSURE: 197 MMHG

## 2021-10-01 LAB
ALBUMIN SERPL-MCNC: 3 G/DL (ref 3.5–5)
ALBUMIN/GLOB SERPL: 0.8 {RATIO} (ref 0.8–2)
ALP SERPL-CCNC: 66 IU/L (ref 40–150)
ALT SERPL-CCNC: 33 IU/L (ref 0–55)
ANION GAP SERPL CALC-SCNC: 14.1 MMOL/L (ref 8–16)
BASOPHILS # BLD AUTO: 0 10*3/UL (ref 0–0.1)
BASOPHILS NFR BLD AUTO: 0.2 % (ref 0–1)
BUN SERPL-MCNC: 54 MG/DL (ref 7–26)
BUN/CREAT SERPL: 40 (ref 6–25)
CALCIUM SERPL-MCNC: 9.7 MG/DL (ref 8.4–10.2)
CHLORIDE SERPL-SCNC: 105 MMOL/L (ref 98–107)
CHOLEST SERPL-MCNC: 91 MD/DL (ref 0–199)
CHOLEST/HDLC SERPL: 2.5 {RATIO} (ref 3.9–4.7)
CK MB SERPL-MCNC: 1.5 NG/ML (ref 0–5)
CK MB SERPL-MCNC: 1.9 NG/ML (ref 0–5)
CK SERPL-CCNC: 54 IU/L (ref 30–200)
CK SERPL-CCNC: 75 IU/L (ref 30–200)
CO2 SERPL-SCNC: 23 MMOL/L (ref 22–29)
DEPRECATED NEUTROPHILS # BLD AUTO: 4.1 10*3/UL (ref 2.1–6.9)
EGFRCR SERPLBLD CKD-EPI 2021: 52 ML/MIN (ref 60–?)
EOSINOPHIL # BLD AUTO: 0 10*3/UL (ref 0–0.4)
EOSINOPHIL NFR BLD AUTO: 0 % (ref 0–6)
ERYTHROCYTE [DISTWIDTH] IN CORD BLOOD: 16 % (ref 11.7–14.4)
GLOBULIN PLAS-MCNC: 3.9 G/DL (ref 2.3–3.5)
GLUCOSE SERPLBLD-MCNC: 342 MG/DL (ref 74–118)
HCT VFR BLD AUTO: 34.5 % (ref 38.2–49.6)
HDLC SERPL-MSCNC: 36 MG/DL (ref 40–60)
HGB BLD-MCNC: 10.6 G/DL (ref 14–18)
LDLC SERPL CALC-MCNC: 42 MG/DL (ref 60–130)
LYMPHOCYTES # BLD: 0.4 10*3/UL (ref 1–3.2)
LYMPHOCYTES NFR BLD AUTO: 8.3 % (ref 18–39.1)
MCH RBC QN AUTO: 26.2 PG (ref 28–32)
MCHC RBC AUTO-ENTMCNC: 30.7 G/DL (ref 31–35)
MCV RBC AUTO: 85.4 FL (ref 81–99)
MONOCYTES # BLD AUTO: 0.3 10*3/UL (ref 0.2–0.8)
MONOCYTES NFR BLD AUTO: 6 % (ref 4.4–11.3)
NEUTS SEG NFR BLD AUTO: 84.9 % (ref 38.7–80)
PLATELET # BLD AUTO: 184 X10E3/UL (ref 140–360)
POTASSIUM SERPL-SCNC: 5.1 MMOL/L (ref 3.5–5.1)
RBC # BLD AUTO: 4.04 X10E6/UL (ref 4.3–5.7)
SODIUM SERPL-SCNC: 137 MMOL/L (ref 136–145)
TRIGL SERPL-MCNC: 63 MG/DL (ref 0–149)

## 2021-10-01 RX ADMIN — MECLIZINE HYDROCHLORIDE SCH MG: 12.5 TABLET ORAL at 08:28

## 2021-10-01 RX ADMIN — PANTOPRAZOLE SODIUM SCH MG: 40 TABLET, DELAYED RELEASE ORAL at 16:19

## 2021-10-01 RX ADMIN — Medication SCH MG: at 08:29

## 2021-10-01 RX ADMIN — INSULIN HUMAN SCH UNIT: 100 INJECTION, SOLUTION PARENTERAL at 16:22

## 2021-10-01 RX ADMIN — Medication SCH MG: at 16:19

## 2021-10-01 RX ADMIN — PANTOPRAZOLE SODIUM SCH MG: 40 TABLET, DELAYED RELEASE ORAL at 06:30

## 2021-10-01 RX ADMIN — FENOFIBRATE SCH MG: 48 TABLET ORAL at 08:30

## 2021-10-01 RX ADMIN — INSULIN HUMAN SCH UNIT: 100 INJECTION, SOLUTION PARENTERAL at 21:00

## 2021-10-01 RX ADMIN — FUROSEMIDE SCH MG: 10 INJECTION, SOLUTION INTRAMUSCULAR; INTRAVENOUS at 16:19

## 2021-10-01 RX ADMIN — CARVEDILOL SCH MG: 3.12 TABLET, FILM COATED ORAL at 16:21

## 2021-10-01 RX ADMIN — MYCOPHENOLATE MOFETIL SCH MG: 250 CAPSULE ORAL at 08:28

## 2021-10-01 RX ADMIN — TAMSULOSIN HYDROCHLORIDE SCH MG: 0.4 CAPSULE ORAL at 08:29

## 2021-10-01 RX ADMIN — HYDRALAZINE HYDROCHLORIDE SCH MG: 100 TABLET, FILM COATED ORAL at 06:30

## 2021-10-01 RX ADMIN — INSULIN HUMAN SCH UNIT: 100 INJECTION, SOLUTION PARENTERAL at 03:53

## 2021-10-01 RX ADMIN — PREDNISONE SCH MG: 5 TABLET ORAL at 08:30

## 2021-10-01 RX ADMIN — CARVEDILOL SCH MG: 3.12 TABLET, FILM COATED ORAL at 08:29

## 2021-10-01 RX ADMIN — INSULIN HUMAN SCH UNIT: 100 INJECTION, SOLUTION PARENTERAL at 08:39

## 2021-10-01 RX ADMIN — MECLIZINE HYDROCHLORIDE SCH MG: 12.5 TABLET ORAL at 13:09

## 2021-10-01 RX ADMIN — HYDRALAZINE HYDROCHLORIDE SCH MG: 100 TABLET, FILM COATED ORAL at 22:00

## 2021-10-01 RX ADMIN — Medication SCH MG: at 08:30

## 2021-10-01 RX ADMIN — ISOSORBIDE DINITRATE SCH MG: 20 TABLET ORAL at 21:00

## 2021-10-01 RX ADMIN — HYDRALAZINE HYDROCHLORIDE SCH MG: 100 TABLET, FILM COATED ORAL at 13:09

## 2021-10-01 RX ADMIN — TACROLIMUS SCH MG: 1 CAPSULE, GELATIN COATED ORAL at 16:19

## 2021-10-01 RX ADMIN — FUROSEMIDE SCH MG: 10 INJECTION, SOLUTION INTRAMUSCULAR; INTRAVENOUS at 08:27

## 2021-10-01 RX ADMIN — INSULIN HUMAN SCH UNIT: 100 INJECTION, SOLUTION PARENTERAL at 11:27

## 2021-10-01 RX ADMIN — MECLIZINE HYDROCHLORIDE SCH MG: 12.5 TABLET ORAL at 21:00

## 2021-10-01 RX ADMIN — MYCOPHENOLATE MOFETIL SCH MG: 250 CAPSULE ORAL at 16:19

## 2021-10-02 VITALS — DIASTOLIC BLOOD PRESSURE: 57 MMHG | SYSTOLIC BLOOD PRESSURE: 155 MMHG

## 2021-10-02 VITALS — SYSTOLIC BLOOD PRESSURE: 157 MMHG | DIASTOLIC BLOOD PRESSURE: 64 MMHG

## 2021-10-02 VITALS — SYSTOLIC BLOOD PRESSURE: 169 MMHG | DIASTOLIC BLOOD PRESSURE: 62 MMHG

## 2021-10-02 VITALS — SYSTOLIC BLOOD PRESSURE: 152 MMHG | DIASTOLIC BLOOD PRESSURE: 63 MMHG

## 2021-10-02 VITALS — DIASTOLIC BLOOD PRESSURE: 63 MMHG | SYSTOLIC BLOOD PRESSURE: 162 MMHG

## 2021-10-02 VITALS — DIASTOLIC BLOOD PRESSURE: 55 MMHG | SYSTOLIC BLOOD PRESSURE: 141 MMHG

## 2021-10-02 VITALS — DIASTOLIC BLOOD PRESSURE: 63 MMHG | SYSTOLIC BLOOD PRESSURE: 152 MMHG

## 2021-10-02 RX ADMIN — HYDRALAZINE HYDROCHLORIDE SCH MG: 100 TABLET, FILM COATED ORAL at 05:37

## 2021-10-02 RX ADMIN — INSULIN HUMAN SCH UNIT: 100 INJECTION, SOLUTION PARENTERAL at 07:30

## 2021-10-02 RX ADMIN — CARVEDILOL SCH MG: 3.12 TABLET, FILM COATED ORAL at 16:31

## 2021-10-02 RX ADMIN — MECLIZINE HYDROCHLORIDE SCH MG: 12.5 TABLET ORAL at 14:14

## 2021-10-02 RX ADMIN — CARVEDILOL SCH MG: 3.12 TABLET, FILM COATED ORAL at 09:25

## 2021-10-02 RX ADMIN — MYCOPHENOLATE MOFETIL SCH MG: 250 CAPSULE ORAL at 16:31

## 2021-10-02 RX ADMIN — OLMESARTAN MEDOXOMIL SCH MG: 20 TABLET, FILM COATED ORAL at 03:18

## 2021-10-02 RX ADMIN — OLMESARTAN MEDOXOMIL SCH MG: 20 TABLET, FILM COATED ORAL at 21:31

## 2021-10-02 RX ADMIN — TACROLIMUS SCH MG: 1 CAPSULE, GELATIN COATED ORAL at 16:32

## 2021-10-02 RX ADMIN — PANTOPRAZOLE SODIUM SCH MG: 40 TABLET, DELAYED RELEASE ORAL at 05:37

## 2021-10-02 RX ADMIN — FUROSEMIDE SCH MG: 10 INJECTION, SOLUTION INTRAMUSCULAR; INTRAVENOUS at 16:30

## 2021-10-02 RX ADMIN — Medication SCH MG: at 09:25

## 2021-10-02 RX ADMIN — MECLIZINE HYDROCHLORIDE SCH MG: 12.5 TABLET ORAL at 21:30

## 2021-10-02 RX ADMIN — ISOSORBIDE DINITRATE SCH MG: 20 TABLET ORAL at 09:25

## 2021-10-02 RX ADMIN — MECLIZINE HYDROCHLORIDE SCH MG: 12.5 TABLET ORAL at 09:24

## 2021-10-02 RX ADMIN — ISOSORBIDE DINITRATE SCH MG: 20 TABLET ORAL at 16:32

## 2021-10-02 RX ADMIN — MYCOPHENOLATE MOFETIL SCH MG: 250 CAPSULE ORAL at 09:24

## 2021-10-02 RX ADMIN — INSULIN HUMAN SCH UNIT: 100 INJECTION, SOLUTION PARENTERAL at 16:30

## 2021-10-02 RX ADMIN — FUROSEMIDE SCH MG: 10 INJECTION, SOLUTION INTRAMUSCULAR; INTRAVENOUS at 09:24

## 2021-10-02 RX ADMIN — INSULIN HUMAN SCH UNIT: 100 INJECTION, SOLUTION PARENTERAL at 21:00

## 2021-10-02 RX ADMIN — PREDNISONE SCH MG: 5 TABLET ORAL at 09:25

## 2021-10-02 RX ADMIN — FENOFIBRATE SCH MG: 48 TABLET ORAL at 09:25

## 2021-10-02 RX ADMIN — Medication SCH MG: at 16:31

## 2021-10-02 RX ADMIN — HYDRALAZINE HYDROCHLORIDE SCH MG: 100 TABLET, FILM COATED ORAL at 14:13

## 2021-10-02 RX ADMIN — INSULIN HUMAN SCH UNIT: 100 INJECTION, SOLUTION PARENTERAL at 11:07

## 2021-10-02 RX ADMIN — PANTOPRAZOLE SODIUM SCH MG: 40 TABLET, DELAYED RELEASE ORAL at 16:32

## 2021-10-02 RX ADMIN — TAMSULOSIN HYDROCHLORIDE SCH MG: 0.4 CAPSULE ORAL at 09:24

## 2021-10-02 RX ADMIN — HYDRALAZINE HYDROCHLORIDE SCH MG: 100 TABLET, FILM COATED ORAL at 21:31

## 2021-10-03 VITALS — DIASTOLIC BLOOD PRESSURE: 59 MMHG | SYSTOLIC BLOOD PRESSURE: 166 MMHG

## 2021-10-03 VITALS — DIASTOLIC BLOOD PRESSURE: 60 MMHG | SYSTOLIC BLOOD PRESSURE: 155 MMHG

## 2021-10-03 VITALS — SYSTOLIC BLOOD PRESSURE: 137 MMHG | DIASTOLIC BLOOD PRESSURE: 116 MMHG

## 2021-10-03 VITALS — SYSTOLIC BLOOD PRESSURE: 166 MMHG | DIASTOLIC BLOOD PRESSURE: 59 MMHG

## 2021-10-03 VITALS — SYSTOLIC BLOOD PRESSURE: 183 MMHG | DIASTOLIC BLOOD PRESSURE: 66 MMHG

## 2021-10-03 VITALS — SYSTOLIC BLOOD PRESSURE: 190 MMHG | DIASTOLIC BLOOD PRESSURE: 80 MMHG

## 2021-10-03 LAB
ANION GAP SERPL CALC-SCNC: 14.7 MMOL/L (ref 8–16)
BASOPHILS # BLD AUTO: 0 10*3/UL (ref 0–0.1)
BASOPHILS NFR BLD AUTO: 0.3 % (ref 0–1)
BUN SERPL-MCNC: 43 MG/DL (ref 7–26)
BUN/CREAT SERPL: 38 (ref 6–25)
CALCIUM SERPL-MCNC: 9.8 MG/DL (ref 8.4–10.2)
CHLORIDE SERPL-SCNC: 105 MMOL/L (ref 98–107)
CO2 SERPL-SCNC: 25 MMOL/L (ref 22–29)
DEPRECATED NEUTROPHILS # BLD AUTO: 6.5 10*3/UL (ref 2.1–6.9)
DEPRECATED PHOSPHATE SERPL-MCNC: 2.6 MG/DL (ref 2.3–4.7)
EGFRCR SERPLBLD CKD-EPI 2021: 64 ML/MIN (ref 60–?)
EOSINOPHIL # BLD AUTO: 0 10*3/UL (ref 0–0.4)
EOSINOPHIL NFR BLD AUTO: 0.2 % (ref 0–6)
ERYTHROCYTE [DISTWIDTH] IN CORD BLOOD: 16.1 % (ref 11.7–14.4)
GLUCOSE SERPLBLD-MCNC: 128 MG/DL (ref 74–118)
HCT VFR BLD AUTO: 35.1 % (ref 38.2–49.6)
HGB BLD-MCNC: 11.1 G/DL (ref 14–18)
LYMPHOCYTES # BLD: 1.1 10*3/UL (ref 1–3.2)
LYMPHOCYTES NFR BLD AUTO: 12.8 % (ref 18–39.1)
MAGNESIUM SERPL-MCNC: 1.6 MG/DL (ref 1.3–2.1)
MCH RBC QN AUTO: 26.4 PG (ref 28–32)
MCHC RBC AUTO-ENTMCNC: 31.6 G/DL (ref 31–35)
MCV RBC AUTO: 83.4 FL (ref 81–99)
MONOCYTES # BLD AUTO: 0.9 10*3/UL (ref 0.2–0.8)
MONOCYTES NFR BLD AUTO: 10.8 % (ref 4.4–11.3)
NEUTS SEG NFR BLD AUTO: 75.4 % (ref 38.7–80)
PLATELET # BLD AUTO: 244 X10E3/UL (ref 140–360)
POTASSIUM SERPL-SCNC: 4.7 MMOL/L (ref 3.5–5.1)
RBC # BLD AUTO: 4.21 X10E6/UL (ref 4.3–5.7)
SODIUM SERPL-SCNC: 140 MMOL/L (ref 136–145)

## 2021-10-03 RX ADMIN — INSULIN HUMAN SCH UNIT: 100 INJECTION, SOLUTION PARENTERAL at 07:30

## 2021-10-03 RX ADMIN — ISOSORBIDE DINITRATE SCH MG: 20 TABLET ORAL at 09:00

## 2021-10-03 RX ADMIN — CARVEDILOL SCH MG: 12.5 TABLET, FILM COATED ORAL at 09:00

## 2021-10-03 RX ADMIN — MYCOPHENOLATE MOFETIL SCH MG: 250 CAPSULE ORAL at 09:00

## 2021-10-03 RX ADMIN — FUROSEMIDE SCH MG: 10 INJECTION, SOLUTION INTRAMUSCULAR; INTRAVENOUS at 09:00

## 2021-10-03 RX ADMIN — PREDNISONE SCH MG: 5 TABLET ORAL at 09:00

## 2021-10-03 RX ADMIN — Medication SCH MG: at 09:00

## 2021-10-03 RX ADMIN — FUROSEMIDE SCH MG: 10 INJECTION, SOLUTION INTRAMUSCULAR; INTRAVENOUS at 17:52

## 2021-10-03 RX ADMIN — MECLIZINE HYDROCHLORIDE SCH MG: 12.5 TABLET ORAL at 15:00

## 2021-10-03 RX ADMIN — TAMSULOSIN HYDROCHLORIDE SCH MG: 0.4 CAPSULE ORAL at 09:00

## 2021-10-03 RX ADMIN — MYCOPHENOLATE MOFETIL SCH MG: 250 CAPSULE ORAL at 17:52

## 2021-10-03 RX ADMIN — INSULIN HUMAN SCH UNIT: 100 INJECTION, SOLUTION PARENTERAL at 11:30

## 2021-10-03 RX ADMIN — Medication SCH MG: at 17:53

## 2021-10-03 RX ADMIN — PANTOPRAZOLE SODIUM SCH MG: 40 TABLET, DELAYED RELEASE ORAL at 17:53

## 2021-10-03 RX ADMIN — HYDRALAZINE HYDROCHLORIDE SCH MG: 100 TABLET, FILM COATED ORAL at 14:00

## 2021-10-03 RX ADMIN — TACROLIMUS SCH MG: 1 CAPSULE, GELATIN COATED ORAL at 17:53

## 2021-10-03 RX ADMIN — CARVEDILOL SCH MG: 12.5 TABLET, FILM COATED ORAL at 17:53

## 2021-10-03 RX ADMIN — INSULIN HUMAN SCH UNIT: 100 INJECTION, SOLUTION PARENTERAL at 16:43

## 2021-10-03 RX ADMIN — MECLIZINE HYDROCHLORIDE SCH MG: 12.5 TABLET ORAL at 09:00

## 2021-10-03 RX ADMIN — HYDRALAZINE HYDROCHLORIDE SCH MG: 100 TABLET, FILM COATED ORAL at 05:33

## 2021-10-03 RX ADMIN — PANTOPRAZOLE SODIUM SCH MG: 40 TABLET, DELAYED RELEASE ORAL at 05:03

## 2021-10-03 RX ADMIN — ISOSORBIDE DINITRATE SCH MG: 20 TABLET ORAL at 17:53

## 2021-10-03 RX ADMIN — FENOFIBRATE SCH MG: 48 TABLET ORAL at 09:00

## 2022-01-12 ENCOUNTER — HOSPITAL ENCOUNTER (EMERGENCY)
Dept: HOSPITAL 88 - ER | Age: 67
Discharge: HOME | End: 2022-01-12
Payer: MEDICARE

## 2022-01-12 VITALS — HEIGHT: 71 IN | BODY MASS INDEX: 32.9 KG/M2 | WEIGHT: 235 LBS

## 2022-01-12 VITALS — SYSTOLIC BLOOD PRESSURE: 103 MMHG | DIASTOLIC BLOOD PRESSURE: 59 MMHG

## 2022-01-12 DIAGNOSIS — R53.83: ICD-10-CM

## 2022-01-12 DIAGNOSIS — U07.1: Primary | ICD-10-CM

## 2022-01-12 DIAGNOSIS — I10: ICD-10-CM

## 2022-01-12 DIAGNOSIS — Z94.0: ICD-10-CM

## 2022-01-12 DIAGNOSIS — Z95.5: ICD-10-CM

## 2022-01-12 PROCEDURE — 99282 EMERGENCY DEPT VISIT SF MDM: CPT

## 2022-03-17 ENCOUNTER — HOSPITAL ENCOUNTER (INPATIENT)
Dept: HOSPITAL 88 - ER | Age: 67
LOS: 6 days | Discharge: NURSING, REHAB OR LONG-TERM CARE | DRG: 177 | End: 2022-03-23
Attending: INTERNAL MEDICINE | Admitting: INTERNAL MEDICINE
Payer: MEDICARE

## 2022-03-17 VITALS — HEIGHT: 71 IN | WEIGHT: 235 LBS | BODY MASS INDEX: 32.9 KG/M2

## 2022-03-17 DIAGNOSIS — N13.6: ICD-10-CM

## 2022-03-17 DIAGNOSIS — Z94.0: ICD-10-CM

## 2022-03-17 DIAGNOSIS — Z95.820: ICD-10-CM

## 2022-03-17 DIAGNOSIS — I50.33: ICD-10-CM

## 2022-03-17 DIAGNOSIS — N18.32: ICD-10-CM

## 2022-03-17 DIAGNOSIS — N40.0: ICD-10-CM

## 2022-03-17 DIAGNOSIS — N39.0: ICD-10-CM

## 2022-03-17 DIAGNOSIS — E66.9: ICD-10-CM

## 2022-03-17 DIAGNOSIS — U07.1: Primary | ICD-10-CM

## 2022-03-17 DIAGNOSIS — B96.4: ICD-10-CM

## 2022-03-17 DIAGNOSIS — E87.5: ICD-10-CM

## 2022-03-17 DIAGNOSIS — Z95.5: ICD-10-CM

## 2022-03-17 DIAGNOSIS — I13.0: ICD-10-CM

## 2022-03-17 DIAGNOSIS — D63.8: ICD-10-CM

## 2022-03-17 DIAGNOSIS — D84.81: ICD-10-CM

## 2022-03-17 DIAGNOSIS — E11.22: ICD-10-CM

## 2022-03-17 DIAGNOSIS — Z16.12: ICD-10-CM

## 2022-03-17 DIAGNOSIS — I96: ICD-10-CM

## 2022-03-17 DIAGNOSIS — J12.82: ICD-10-CM

## 2022-03-17 DIAGNOSIS — J96.01: ICD-10-CM

## 2022-03-17 DIAGNOSIS — E11.52: ICD-10-CM

## 2022-03-17 DIAGNOSIS — N17.9: ICD-10-CM

## 2022-03-17 LAB
ALBUMIN SERPL-MCNC: 2.9 G/DL (ref 3.5–5)
ALBUMIN/GLOB SERPL: 0.7 {RATIO} (ref 0.8–2)
ALP SERPL-CCNC: 102 IU/L (ref 40–150)
ALT SERPL-CCNC: 30 IU/L (ref 0–55)
ANION GAP SERPL CALC-SCNC: 10.4 MMOL/L (ref 8–16)
BASOPHILS # BLD AUTO: 0 10*3/UL (ref 0–0.1)
BASOPHILS NFR BLD AUTO: 0.6 % (ref 0–1)
BUN SERPL-MCNC: 33 MG/DL (ref 7–26)
BUN/CREAT SERPL: 20 (ref 6–25)
CALCIUM SERPL-MCNC: 9.7 MG/DL (ref 8.4–10.2)
CHLORIDE SERPL-SCNC: 101 MMOL/L (ref 98–107)
CK MB SERPL-MCNC: 1.9 NG/ML (ref 0–5)
CK SERPL-CCNC: 85 IU/L (ref 30–200)
CO2 SERPL-SCNC: 26 MMOL/L (ref 22–29)
DEPRECATED NEUTROPHILS # BLD AUTO: 5.1 10*3/UL (ref 2.1–6.9)
EGFRCR SERPLBLD CKD-EPI 2021: 41 ML/MIN (ref 60–?)
EOSINOPHIL # BLD AUTO: 0 10*3/UL (ref 0–0.4)
EOSINOPHIL NFR BLD AUTO: 0.3 % (ref 0–6)
ERYTHROCYTE [DISTWIDTH] IN CORD BLOOD: 16.7 % (ref 11.7–14.4)
GLOBULIN PLAS-MCNC: 4.4 G/DL (ref 2.3–3.5)
GLUCOSE SERPLBLD-MCNC: 231 MG/DL (ref 74–118)
HCT VFR BLD AUTO: 32.5 % (ref 38.2–49.6)
HGB BLD-MCNC: 10.2 G/DL (ref 14–18)
LYMPHOCYTES # BLD: 1 10*3/UL (ref 1–3.2)
LYMPHOCYTES NFR BLD AUTO: 14.2 % (ref 18–39.1)
MCH RBC QN AUTO: 27 PG (ref 28–32)
MCHC RBC AUTO-ENTMCNC: 31.4 G/DL (ref 31–35)
MCV RBC AUTO: 86 FL (ref 81–99)
MONOCYTES # BLD AUTO: 0.7 10*3/UL (ref 0.2–0.8)
MONOCYTES NFR BLD AUTO: 10.2 % (ref 4.4–11.3)
NEUTS SEG NFR BLD AUTO: 73.8 % (ref 38.7–80)
PLATELET # BLD AUTO: 227 X10E3/UL (ref 140–360)
POTASSIUM SERPL-SCNC: 5.4 MMOL/L (ref 3.5–5.1)
RBC # BLD AUTO: 3.78 X10E6/UL (ref 4.3–5.7)
SODIUM SERPL-SCNC: 132 MMOL/L (ref 136–145)

## 2022-03-17 PROCEDURE — 80197 ASSAY OF TACROLIMUS: CPT

## 2022-03-17 PROCEDURE — 83615 LACTATE (LD) (LDH) ENZYME: CPT

## 2022-03-17 PROCEDURE — 85025 COMPLETE CBC W/AUTO DIFF WBC: CPT

## 2022-03-17 PROCEDURE — 99152 MOD SED SAME PHYS/QHP 5/>YRS: CPT

## 2022-03-17 PROCEDURE — 84443 ASSAY THYROID STIM HORMONE: CPT

## 2022-03-17 PROCEDURE — 85610 PROTHROMBIN TIME: CPT

## 2022-03-17 PROCEDURE — 82570 ASSAY OF URINE CREATININE: CPT

## 2022-03-17 PROCEDURE — 36415 COLL VENOUS BLD VENIPUNCTURE: CPT

## 2022-03-17 PROCEDURE — 82553 CREATINE MB FRACTION: CPT

## 2022-03-17 PROCEDURE — 93005 ELECTROCARDIOGRAM TRACING: CPT

## 2022-03-17 PROCEDURE — 78580 LUNG PERFUSION IMAGING: CPT

## 2022-03-17 PROCEDURE — 84436 ASSAY OF TOTAL THYROXINE: CPT

## 2022-03-17 PROCEDURE — 81001 URINALYSIS AUTO W/SCOPE: CPT

## 2022-03-17 PROCEDURE — 87186 SC STD MICRODIL/AGAR DIL: CPT

## 2022-03-17 PROCEDURE — 84100 ASSAY OF PHOSPHORUS: CPT

## 2022-03-17 PROCEDURE — 86141 C-REACTIVE PROTEIN HS: CPT

## 2022-03-17 PROCEDURE — 84300 ASSAY OF URINE SODIUM: CPT

## 2022-03-17 PROCEDURE — 80048 BASIC METABOLIC PNL TOTAL CA: CPT

## 2022-03-17 PROCEDURE — 77001 FLUOROGUIDE FOR VEIN DEVICE: CPT

## 2022-03-17 PROCEDURE — 99153 MOD SED SAME PHYS/QHP EA: CPT

## 2022-03-17 PROCEDURE — 82948 REAGENT STRIP/BLOOD GLUCOSE: CPT

## 2022-03-17 PROCEDURE — 80053 COMPREHEN METABOLIC PANEL: CPT

## 2022-03-17 PROCEDURE — 87086 URINE CULTURE/COLONY COUNT: CPT

## 2022-03-17 PROCEDURE — 83605 ASSAY OF LACTIC ACID: CPT

## 2022-03-17 PROCEDURE — 99251: CPT

## 2022-03-17 PROCEDURE — 87040 BLOOD CULTURE FOR BACTERIA: CPT

## 2022-03-17 PROCEDURE — 84484 ASSAY OF TROPONIN QUANT: CPT

## 2022-03-17 PROCEDURE — 83880 ASSAY OF NATRIURETIC PEPTIDE: CPT

## 2022-03-17 PROCEDURE — 84479 ASSAY OF THYROID (T3 OR T4): CPT

## 2022-03-17 PROCEDURE — 83735 ASSAY OF MAGNESIUM: CPT

## 2022-03-17 PROCEDURE — 82550 ASSAY OF CK (CPK): CPT

## 2022-03-17 PROCEDURE — 36558 INSERT TUNNELED CV CATH: CPT

## 2022-03-17 PROCEDURE — 71046 X-RAY EXAM CHEST 2 VIEWS: CPT

## 2022-03-17 PROCEDURE — 94799 UNLISTED PULMONARY SVC/PX: CPT

## 2022-03-17 PROCEDURE — 82728 ASSAY OF FERRITIN: CPT

## 2022-03-17 PROCEDURE — 71045 X-RAY EXAM CHEST 1 VIEW: CPT

## 2022-03-17 PROCEDURE — 74470 X-RAY EXAM OF KIDNEY LESION: CPT

## 2022-03-17 PROCEDURE — 76937 US GUIDE VASCULAR ACCESS: CPT

## 2022-03-17 PROCEDURE — 93306 TTE W/DOPPLER COMPLETE: CPT

## 2022-03-17 PROCEDURE — 99284 EMERGENCY DEPT VISIT MOD MDM: CPT

## 2022-03-17 RX ADMIN — INSULIN HUMAN SCH UNIT: 100 INJECTION, SOLUTION PARENTERAL at 23:15

## 2022-03-17 RX ADMIN — FUROSEMIDE SCH MG: 10 INJECTION, SOLUTION INTRAMUSCULAR; INTRAVENOUS at 22:50

## 2022-03-18 VITALS — SYSTOLIC BLOOD PRESSURE: 169 MMHG | DIASTOLIC BLOOD PRESSURE: 72 MMHG

## 2022-03-18 VITALS — DIASTOLIC BLOOD PRESSURE: 75 MMHG | SYSTOLIC BLOOD PRESSURE: 189 MMHG

## 2022-03-18 VITALS — DIASTOLIC BLOOD PRESSURE: 64 MMHG | SYSTOLIC BLOOD PRESSURE: 175 MMHG

## 2022-03-18 VITALS — DIASTOLIC BLOOD PRESSURE: 92 MMHG | SYSTOLIC BLOOD PRESSURE: 166 MMHG

## 2022-03-18 VITALS — DIASTOLIC BLOOD PRESSURE: 79 MMHG | SYSTOLIC BLOOD PRESSURE: 158 MMHG

## 2022-03-18 VITALS — SYSTOLIC BLOOD PRESSURE: 189 MMHG | DIASTOLIC BLOOD PRESSURE: 75 MMHG

## 2022-03-18 VITALS — DIASTOLIC BLOOD PRESSURE: 65 MMHG | SYSTOLIC BLOOD PRESSURE: 176 MMHG

## 2022-03-18 VITALS — DIASTOLIC BLOOD PRESSURE: 72 MMHG | SYSTOLIC BLOOD PRESSURE: 169 MMHG

## 2022-03-18 LAB
ALBUMIN SERPL-MCNC: 2.9 G/DL (ref 3.5–5)
ALBUMIN/GLOB SERPL: 0.6 {RATIO} (ref 0.8–2)
ALP SERPL-CCNC: 96 IU/L (ref 40–150)
ALT SERPL-CCNC: 29 IU/L (ref 0–55)
ANION GAP SERPL CALC-SCNC: 12.7 MMOL/L (ref 8–16)
BACTERIA URNS QL MICRO: (no result) /HPF
BASOPHILS # BLD AUTO: 0 10*3/UL (ref 0–0.1)
BASOPHILS NFR BLD AUTO: 0.4 % (ref 0–1)
BUN SERPL-MCNC: 32 MG/DL (ref 7–26)
BUN/CREAT SERPL: 22 (ref 6–25)
CALCIUM SERPL-MCNC: 9.8 MG/DL (ref 8.4–10.2)
CHLORIDE SERPL-SCNC: 101 MMOL/L (ref 98–107)
CK MB SERPL-MCNC: 1.2 NG/ML (ref 0–5)
CK MB SERPL-MCNC: 1.7 NG/ML (ref 0–5)
CK SERPL-CCNC: 38 IU/L (ref 30–200)
CK SERPL-CCNC: 58 IU/L (ref 30–200)
CLARITY UR: CLEAR
CO2 SERPL-SCNC: 27 MMOL/L (ref 22–29)
COLOR UR: YELLOW
CREAT UR-MCNC: 14.39 MG/DL (ref 63–166)
DEPRECATED FTI SERPL-MCNC: 1.76 UG/DL (ref 1.4–3.8)
DEPRECATED NEUTROPHILS # BLD AUTO: 6 10*3/UL (ref 2.1–6.9)
DEPRECATED RBC URNS MANUAL-ACNC: (no result) /HPF (ref 0–5)
EGFRCR SERPLBLD CKD-EPI 2021: 48 ML/MIN (ref 60–?)
EOSINOPHIL # BLD AUTO: 0 10*3/UL (ref 0–0.4)
EOSINOPHIL NFR BLD AUTO: 0.3 % (ref 0–6)
EPI CELLS URNS QL MICRO: (no result) /LPF
ERYTHROCYTE [DISTWIDTH] IN CORD BLOOD: 16.6 % (ref 11.7–14.4)
FERRITIN SERPL-MCNC: 462.58 NG/ML (ref 21.81–274.66)
GLOBULIN PLAS-MCNC: 4.6 G/DL (ref 2.3–3.5)
GLUCOSE SERPLBLD-MCNC: 157 MG/DL (ref 74–118)
HCT VFR BLD AUTO: 32.9 % (ref 38.2–49.6)
HGB BLD-MCNC: 10.3 G/DL (ref 14–18)
KETONES UR QL STRIP.AUTO: NEGATIVE
LDH SERPL-CCNC: 191 IU/L (ref 125–220)
LEUKOCYTE ESTERASE UR QL STRIP.AUTO: (no result)
LYMPHOCYTES # BLD: 0.6 10*3/UL (ref 1–3.2)
LYMPHOCYTES NFR BLD AUTO: 8.3 % (ref 18–39.1)
MCH RBC QN AUTO: 27 PG (ref 28–32)
MCHC RBC AUTO-ENTMCNC: 31.3 G/DL (ref 31–35)
MCV RBC AUTO: 86.1 FL (ref 81–99)
MONOCYTES # BLD AUTO: 0.6 10*3/UL (ref 0.2–0.8)
MONOCYTES NFR BLD AUTO: 7.5 % (ref 4.4–11.3)
NEUTS SEG NFR BLD AUTO: 82.1 % (ref 38.7–80)
NITRITE UR QL STRIP.AUTO: NEGATIVE
PLATELET # BLD AUTO: 216 X10E3/UL (ref 140–360)
POTASSIUM SERPL-SCNC: 4.7 MMOL/L (ref 3.5–5.1)
PROT UR QL STRIP.AUTO: NEGATIVE
RBC # BLD AUTO: 3.82 X10E6/UL (ref 4.3–5.7)
SODIUM SERPL-SCNC: 136 MMOL/L (ref 136–145)
SODIUM UR-SCNC: 111 MMOL/L
SP GR UR STRIP: 1.01 (ref 1.01–1.02)
TSH SERPL DL<=0.005 MIU/L-ACNC: 7.86 UIU/ML (ref 0.35–4.94)
UROBILINOGEN UR STRIP-MCNC: 0.2 MG/DL (ref 0.2–1)

## 2022-03-18 RX ADMIN — INSULIN HUMAN SCH UNIT: 100 INJECTION, SOLUTION PARENTERAL at 21:07

## 2022-03-18 RX ADMIN — INSULIN HUMAN SCH UNIT: 100 INJECTION, SOLUTION PARENTERAL at 07:30

## 2022-03-18 RX ADMIN — OXYCODONE HYDROCHLORIDE AND ACETAMINOPHEN SCH MG: 500 TABLET ORAL at 17:13

## 2022-03-18 RX ADMIN — HYDRALAZINE HYDROCHLORIDE SCH MG: 100 TABLET, FILM COATED ORAL at 16:20

## 2022-03-18 RX ADMIN — ISOSORBIDE DINITRATE SCH MG: 20 TABLET ORAL at 09:02

## 2022-03-18 RX ADMIN — SODIUM CHLORIDE SCH MLS/HR: 9 INJECTION, SOLUTION INTRAVENOUS at 09:00

## 2022-03-18 RX ADMIN — OXYCODONE HYDROCHLORIDE AND ACETAMINOPHEN SCH MG: 500 TABLET ORAL at 09:02

## 2022-03-18 RX ADMIN — TACROLIMUS SCH MG: 1 CAPSULE, GELATIN COATED ORAL at 09:02

## 2022-03-18 RX ADMIN — TACROLIMUS SCH MG: 1 CAPSULE, GELATIN COATED ORAL at 21:10

## 2022-03-18 RX ADMIN — Medication SCH MG: at 09:02

## 2022-03-18 RX ADMIN — ATORVASTATIN CALCIUM SCH MG: 20 TABLET, FILM COATED ORAL at 21:10

## 2022-03-18 RX ADMIN — LINEZOLID SCH MLS/HR: 600 INJECTION, SOLUTION INTRAVENOUS at 17:37

## 2022-03-18 RX ADMIN — HYDRALAZINE HYDROCHLORIDE PRN MG: 20 INJECTION INTRAMUSCULAR; INTRAVENOUS at 12:22

## 2022-03-18 RX ADMIN — HYDRALAZINE HYDROCHLORIDE SCH MG: 100 TABLET, FILM COATED ORAL at 21:10

## 2022-03-18 RX ADMIN — TAMSULOSIN HYDROCHLORIDE SCH MG: 0.4 CAPSULE ORAL at 09:01

## 2022-03-18 RX ADMIN — FUROSEMIDE SCH MG: 10 INJECTION, SOLUTION INTRAMUSCULAR; INTRAVENOUS at 14:51

## 2022-03-18 RX ADMIN — INSULIN HUMAN SCH UNIT: 100 INJECTION, SOLUTION PARENTERAL at 16:30

## 2022-03-18 RX ADMIN — Medication SCH MG: at 17:13

## 2022-03-18 RX ADMIN — INSULIN HUMAN SCH UNIT: 100 INJECTION, SOLUTION PARENTERAL at 11:30

## 2022-03-18 RX ADMIN — Medication SCH MG: at 09:01

## 2022-03-18 RX ADMIN — CARVEDILOL SCH MG: 12.5 TABLET, FILM COATED ORAL at 17:12

## 2022-03-18 RX ADMIN — HYDRALAZINE HYDROCHLORIDE SCH MG: 100 TABLET, FILM COATED ORAL at 09:02

## 2022-03-18 RX ADMIN — SODIUM CHLORIDE SCH MLS/HR: 9 INJECTION, SOLUTION INTRAVENOUS at 21:10

## 2022-03-18 RX ADMIN — MYCOPHENOLATE MOFETIL SCH MG: 250 CAPSULE ORAL at 09:00

## 2022-03-18 RX ADMIN — ISOSORBIDE DINITRATE SCH MG: 20 TABLET ORAL at 17:13

## 2022-03-18 RX ADMIN — FUROSEMIDE SCH MG: 10 INJECTION, SOLUTION INTRAMUSCULAR; INTRAVENOUS at 21:10

## 2022-03-18 RX ADMIN — Medication SCH MG: at 09:00

## 2022-03-18 RX ADMIN — FAMOTIDINE SCH MG: 20 TABLET, FILM COATED ORAL at 09:02

## 2022-03-18 RX ADMIN — FUROSEMIDE SCH MG: 10 INJECTION, SOLUTION INTRAMUSCULAR; INTRAVENOUS at 05:17

## 2022-03-18 RX ADMIN — MYCOPHENOLATE MOFETIL SCH MG: 250 CAPSULE ORAL at 17:12

## 2022-03-18 RX ADMIN — ENOXAPARIN SODIUM SCH MG: 40 INJECTION SUBCUTANEOUS at 17:13

## 2022-03-18 RX ADMIN — PREDNISONE SCH MG: 5 TABLET ORAL at 09:02

## 2022-03-19 VITALS — DIASTOLIC BLOOD PRESSURE: 51 MMHG | SYSTOLIC BLOOD PRESSURE: 156 MMHG

## 2022-03-19 VITALS — DIASTOLIC BLOOD PRESSURE: 67 MMHG | SYSTOLIC BLOOD PRESSURE: 179 MMHG

## 2022-03-19 VITALS — DIASTOLIC BLOOD PRESSURE: 61 MMHG | SYSTOLIC BLOOD PRESSURE: 157 MMHG

## 2022-03-19 VITALS — SYSTOLIC BLOOD PRESSURE: 177 MMHG | DIASTOLIC BLOOD PRESSURE: 66 MMHG

## 2022-03-19 VITALS — SYSTOLIC BLOOD PRESSURE: 179 MMHG | DIASTOLIC BLOOD PRESSURE: 67 MMHG

## 2022-03-19 VITALS — SYSTOLIC BLOOD PRESSURE: 123 MMHG | DIASTOLIC BLOOD PRESSURE: 79 MMHG

## 2022-03-19 VITALS — SYSTOLIC BLOOD PRESSURE: 163 MMHG | DIASTOLIC BLOOD PRESSURE: 61 MMHG

## 2022-03-19 LAB
ANION GAP SERPL CALC-SCNC: 12.3 MMOL/L (ref 8–16)
BASOPHILS # BLD AUTO: 0 10*3/UL (ref 0–0.1)
BASOPHILS NFR BLD AUTO: 0.1 % (ref 0–1)
BUN SERPL-MCNC: 35 MG/DL (ref 7–26)
BUN/CREAT SERPL: 25 (ref 6–25)
CALCIUM SERPL-MCNC: 9.5 MG/DL (ref 8.4–10.2)
CHLORIDE SERPL-SCNC: 97 MMOL/L (ref 98–107)
CK MB SERPL-MCNC: 0.6 NG/ML (ref 0–5)
CK SERPL-CCNC: 22 IU/L (ref 30–200)
CO2 SERPL-SCNC: 30 MMOL/L (ref 22–29)
DEPRECATED NEUTROPHILS # BLD AUTO: 5.6 10*3/UL (ref 2.1–6.9)
DEPRECATED PHOSPHATE SERPL-MCNC: 3.1 MG/DL (ref 2.3–4.7)
EGFRCR SERPLBLD CKD-EPI 2021: 50 ML/MIN (ref 60–?)
EOSINOPHIL # BLD AUTO: 0 10*3/UL (ref 0–0.4)
EOSINOPHIL NFR BLD AUTO: 0.3 % (ref 0–6)
ERYTHROCYTE [DISTWIDTH] IN CORD BLOOD: 16.7 % (ref 11.7–14.4)
GLUCOSE SERPLBLD-MCNC: 260 MG/DL (ref 74–118)
HCT VFR BLD AUTO: 32 % (ref 38.2–49.6)
HGB BLD-MCNC: 10.1 G/DL (ref 14–18)
LYMPHOCYTES # BLD: 0.5 10*3/UL (ref 1–3.2)
LYMPHOCYTES NFR BLD AUTO: 8 % (ref 18–39.1)
MCH RBC QN AUTO: 27 PG (ref 28–32)
MCHC RBC AUTO-ENTMCNC: 31.6 G/DL (ref 31–35)
MCV RBC AUTO: 85.6 FL (ref 81–99)
MONOCYTES # BLD AUTO: 0.5 10*3/UL (ref 0.2–0.8)
MONOCYTES NFR BLD AUTO: 7.2 % (ref 4.4–11.3)
NEUTS SEG NFR BLD AUTO: 82.9 % (ref 38.7–80)
PLATELET # BLD AUTO: 227 X10E3/UL (ref 140–360)
POTASSIUM SERPL-SCNC: 4.3 MMOL/L (ref 3.5–5.1)
RBC # BLD AUTO: 3.74 X10E6/UL (ref 4.3–5.7)
SODIUM SERPL-SCNC: 135 MMOL/L (ref 136–145)

## 2022-03-19 RX ADMIN — Medication SCH MG: at 09:00

## 2022-03-19 RX ADMIN — INSULIN HUMAN SCH UNIT: 100 INJECTION, SOLUTION PARENTERAL at 12:47

## 2022-03-19 RX ADMIN — HYDRALAZINE HYDROCHLORIDE SCH MG: 100 TABLET, FILM COATED ORAL at 12:00

## 2022-03-19 RX ADMIN — HYDRALAZINE HYDROCHLORIDE SCH MG: 100 TABLET, FILM COATED ORAL at 23:59

## 2022-03-19 RX ADMIN — TACROLIMUS SCH MG: 1 CAPSULE, GELATIN COATED ORAL at 09:00

## 2022-03-19 RX ADMIN — INSULIN HUMAN SCH UNIT: 100 INJECTION, SOLUTION PARENTERAL at 18:07

## 2022-03-19 RX ADMIN — MYCOPHENOLATE MOFETIL SCH MG: 250 CAPSULE ORAL at 09:00

## 2022-03-19 RX ADMIN — CARVEDILOL SCH MG: 12.5 TABLET, FILM COATED ORAL at 08:00

## 2022-03-19 RX ADMIN — SODIUM CHLORIDE SCH MLS/HR: 9 INJECTION, SOLUTION INTRAVENOUS at 09:00

## 2022-03-19 RX ADMIN — CARVEDILOL SCH MG: 12.5 TABLET, FILM COATED ORAL at 18:10

## 2022-03-19 RX ADMIN — INSULIN HUMAN SCH UNIT: 100 INJECTION, SOLUTION PARENTERAL at 07:30

## 2022-03-19 RX ADMIN — Medication SCH MG: at 18:10

## 2022-03-19 RX ADMIN — ISOSORBIDE DINITRATE SCH MG: 20 TABLET ORAL at 09:00

## 2022-03-19 RX ADMIN — INSULIN HUMAN SCH UNIT: 100 INJECTION, SOLUTION PARENTERAL at 22:00

## 2022-03-19 RX ADMIN — FUROSEMIDE SCH MG: 10 INJECTION, SOLUTION INTRAMUSCULAR; INTRAVENOUS at 05:12

## 2022-03-19 RX ADMIN — OXYCODONE HYDROCHLORIDE AND ACETAMINOPHEN SCH MG: 500 TABLET ORAL at 18:11

## 2022-03-19 RX ADMIN — ENOXAPARIN SODIUM SCH MG: 40 INJECTION SUBCUTANEOUS at 18:11

## 2022-03-19 RX ADMIN — ISOSORBIDE DINITRATE SCH MG: 20 TABLET ORAL at 18:11

## 2022-03-19 RX ADMIN — HYDRALAZINE HYDROCHLORIDE SCH MG: 100 TABLET, FILM COATED ORAL at 11:15

## 2022-03-19 RX ADMIN — TACROLIMUS SCH MG: 1 CAPSULE, GELATIN COATED ORAL at 22:00

## 2022-03-19 RX ADMIN — LINEZOLID SCH MLS/HR: 600 INJECTION, SOLUTION INTRAVENOUS at 18:11

## 2022-03-19 RX ADMIN — LINEZOLID SCH MLS/HR: 600 INJECTION, SOLUTION INTRAVENOUS at 05:12

## 2022-03-19 RX ADMIN — OXYCODONE HYDROCHLORIDE AND ACETAMINOPHEN SCH MG: 500 TABLET ORAL at 09:00

## 2022-03-19 RX ADMIN — HYDRALAZINE HYDROCHLORIDE SCH MG: 100 TABLET, FILM COATED ORAL at 18:11

## 2022-03-19 RX ADMIN — FAMOTIDINE SCH MG: 20 TABLET, FILM COATED ORAL at 09:00

## 2022-03-19 RX ADMIN — Medication SCH MG: at 18:11

## 2022-03-19 RX ADMIN — FUROSEMIDE SCH MG: 10 INJECTION, SOLUTION INTRAMUSCULAR; INTRAVENOUS at 14:00

## 2022-03-19 RX ADMIN — HYDRALAZINE HYDROCHLORIDE PRN MG: 20 INJECTION INTRAMUSCULAR; INTRAVENOUS at 22:00

## 2022-03-19 RX ADMIN — PREDNISONE SCH MG: 5 TABLET ORAL at 09:00

## 2022-03-19 RX ADMIN — FUROSEMIDE SCH MG: 10 INJECTION, SOLUTION INTRAMUSCULAR; INTRAVENOUS at 22:00

## 2022-03-19 RX ADMIN — MYCOPHENOLATE MOFETIL SCH MG: 250 CAPSULE ORAL at 18:09

## 2022-03-19 RX ADMIN — SODIUM CHLORIDE SCH MLS/HR: 9 INJECTION, SOLUTION INTRAVENOUS at 22:00

## 2022-03-19 RX ADMIN — ATORVASTATIN CALCIUM SCH MG: 20 TABLET, FILM COATED ORAL at 22:00

## 2022-03-19 RX ADMIN — TAMSULOSIN HYDROCHLORIDE SCH MG: 0.4 CAPSULE ORAL at 09:00

## 2022-03-20 VITALS — SYSTOLIC BLOOD PRESSURE: 146 MMHG | DIASTOLIC BLOOD PRESSURE: 54 MMHG

## 2022-03-20 VITALS — SYSTOLIC BLOOD PRESSURE: 119 MMHG | DIASTOLIC BLOOD PRESSURE: 51 MMHG

## 2022-03-20 VITALS — DIASTOLIC BLOOD PRESSURE: 49 MMHG | SYSTOLIC BLOOD PRESSURE: 91 MMHG

## 2022-03-20 VITALS — SYSTOLIC BLOOD PRESSURE: 140 MMHG | DIASTOLIC BLOOD PRESSURE: 54 MMHG

## 2022-03-20 VITALS — DIASTOLIC BLOOD PRESSURE: 54 MMHG | SYSTOLIC BLOOD PRESSURE: 140 MMHG

## 2022-03-20 VITALS — DIASTOLIC BLOOD PRESSURE: 60 MMHG | SYSTOLIC BLOOD PRESSURE: 142 MMHG

## 2022-03-20 VITALS — SYSTOLIC BLOOD PRESSURE: 134 MMHG | DIASTOLIC BLOOD PRESSURE: 57 MMHG

## 2022-03-20 LAB
ALBUMIN SERPL-MCNC: 2.5 G/DL (ref 3.5–5)
ALBUMIN/GLOB SERPL: 0.5 {RATIO} (ref 0.8–2)
ALP SERPL-CCNC: 78 IU/L (ref 40–150)
ALT SERPL-CCNC: 28 IU/L (ref 0–55)
ANION GAP SERPL CALC-SCNC: 12.7 MMOL/L (ref 8–16)
BASOPHILS # BLD AUTO: 0 10*3/UL (ref 0–0.1)
BASOPHILS NFR BLD AUTO: 0.3 % (ref 0–1)
BUN SERPL-MCNC: 45 MG/DL (ref 7–26)
BUN/CREAT SERPL: 30 (ref 6–25)
CALCIUM SERPL-MCNC: 9.9 MG/DL (ref 8.4–10.2)
CHLORIDE SERPL-SCNC: 97 MMOL/L (ref 98–107)
CO2 SERPL-SCNC: 32 MMOL/L (ref 22–29)
DEPRECATED NEUTROPHILS # BLD AUTO: 5.2 10*3/UL (ref 2.1–6.9)
EGFRCR SERPLBLD CKD-EPI 2021: 47 ML/MIN (ref 60–?)
EOSINOPHIL # BLD AUTO: 0.2 10*3/UL (ref 0–0.4)
EOSINOPHIL NFR BLD AUTO: 2.2 % (ref 0–6)
ERYTHROCYTE [DISTWIDTH] IN CORD BLOOD: 16.5 % (ref 11.7–14.4)
GLOBULIN PLAS-MCNC: 4.7 G/DL (ref 2.3–3.5)
GLUCOSE SERPLBLD-MCNC: 184 MG/DL (ref 74–118)
HCT VFR BLD AUTO: 30.9 % (ref 38.2–49.6)
HGB BLD-MCNC: 9.6 G/DL (ref 14–18)
LYMPHOCYTES # BLD: 0.9 10*3/UL (ref 1–3.2)
LYMPHOCYTES NFR BLD AUTO: 13.2 % (ref 18–39.1)
MCH RBC QN AUTO: 26.9 PG (ref 28–32)
MCHC RBC AUTO-ENTMCNC: 31.1 G/DL (ref 31–35)
MCV RBC AUTO: 86.6 FL (ref 81–99)
MONOCYTES # BLD AUTO: 0.5 10*3/UL (ref 0.2–0.8)
MONOCYTES NFR BLD AUTO: 7.6 % (ref 4.4–11.3)
NEUTS SEG NFR BLD AUTO: 75.5 % (ref 38.7–80)
PLATELET # BLD AUTO: 236 X10E3/UL (ref 140–360)
POTASSIUM SERPL-SCNC: 4.7 MMOL/L (ref 3.5–5.1)
RBC # BLD AUTO: 3.57 X10E6/UL (ref 4.3–5.7)
SODIUM SERPL-SCNC: 137 MMOL/L (ref 136–145)

## 2022-03-20 RX ADMIN — ENOXAPARIN SODIUM SCH MG: 40 INJECTION SUBCUTANEOUS at 17:22

## 2022-03-20 RX ADMIN — SODIUM CHLORIDE SCH MLS/HR: 9 INJECTION, SOLUTION INTRAVENOUS at 09:21

## 2022-03-20 RX ADMIN — ISOSORBIDE DINITRATE SCH MG: 20 TABLET ORAL at 17:00

## 2022-03-20 RX ADMIN — HYDRALAZINE HYDROCHLORIDE SCH MG: 100 TABLET, FILM COATED ORAL at 17:22

## 2022-03-20 RX ADMIN — HYDRALAZINE HYDROCHLORIDE SCH MG: 100 TABLET, FILM COATED ORAL at 06:30

## 2022-03-20 RX ADMIN — Medication SCH MG: at 09:23

## 2022-03-20 RX ADMIN — Medication SCH MG: at 09:21

## 2022-03-20 RX ADMIN — OXYCODONE HYDROCHLORIDE AND ACETAMINOPHEN SCH MG: 500 TABLET ORAL at 17:22

## 2022-03-20 RX ADMIN — HYDRALAZINE HYDROCHLORIDE SCH MG: 100 TABLET, FILM COATED ORAL at 12:15

## 2022-03-20 RX ADMIN — TAMSULOSIN HYDROCHLORIDE SCH MG: 0.4 CAPSULE ORAL at 09:21

## 2022-03-20 RX ADMIN — Medication SCH MG: at 17:21

## 2022-03-20 RX ADMIN — Medication SCH MG: at 17:22

## 2022-03-20 RX ADMIN — INSULIN HUMAN SCH UNIT: 100 INJECTION, SOLUTION PARENTERAL at 21:46

## 2022-03-20 RX ADMIN — MEROPENEM SCH MLS/HR: 500 INJECTION INTRAVENOUS at 14:13

## 2022-03-20 RX ADMIN — MYCOPHENOLATE MOFETIL SCH MG: 250 CAPSULE ORAL at 17:20

## 2022-03-20 RX ADMIN — FUROSEMIDE SCH MG: 10 INJECTION, SOLUTION INTRAMUSCULAR; INTRAVENOUS at 14:13

## 2022-03-20 RX ADMIN — INSULIN HUMAN SCH UNIT: 100 INJECTION, SOLUTION PARENTERAL at 12:15

## 2022-03-20 RX ADMIN — LINEZOLID SCH MLS/HR: 600 INJECTION, SOLUTION INTRAVENOUS at 06:30

## 2022-03-20 RX ADMIN — CARVEDILOL SCH MG: 12.5 TABLET, FILM COATED ORAL at 17:00

## 2022-03-20 RX ADMIN — TACROLIMUS SCH MG: 1 CAPSULE, GELATIN COATED ORAL at 09:23

## 2022-03-20 RX ADMIN — FAMOTIDINE SCH MG: 20 TABLET, FILM COATED ORAL at 09:23

## 2022-03-20 RX ADMIN — CARVEDILOL SCH MG: 12.5 TABLET, FILM COATED ORAL at 09:21

## 2022-03-20 RX ADMIN — FUROSEMIDE SCH MG: 10 INJECTION, SOLUTION INTRAMUSCULAR; INTRAVENOUS at 21:46

## 2022-03-20 RX ADMIN — FUROSEMIDE SCH MG: 10 INJECTION, SOLUTION INTRAMUSCULAR; INTRAVENOUS at 06:30

## 2022-03-20 RX ADMIN — MYCOPHENOLATE MOFETIL SCH MG: 250 CAPSULE ORAL at 09:21

## 2022-03-20 RX ADMIN — ISOSORBIDE DINITRATE SCH MG: 20 TABLET ORAL at 09:22

## 2022-03-20 RX ADMIN — ATORVASTATIN CALCIUM SCH MG: 20 TABLET, FILM COATED ORAL at 21:46

## 2022-03-20 RX ADMIN — OXYCODONE HYDROCHLORIDE AND ACETAMINOPHEN SCH MG: 500 TABLET ORAL at 09:23

## 2022-03-20 RX ADMIN — PREDNISONE SCH MG: 5 TABLET ORAL at 09:23

## 2022-03-20 RX ADMIN — INSULIN HUMAN SCH UNIT: 100 INJECTION, SOLUTION PARENTERAL at 09:31

## 2022-03-20 RX ADMIN — TACROLIMUS SCH MG: 1 CAPSULE, GELATIN COATED ORAL at 21:46

## 2022-03-20 RX ADMIN — INSULIN HUMAN SCH UNIT: 100 INJECTION, SOLUTION PARENTERAL at 17:20

## 2022-03-21 VITALS — DIASTOLIC BLOOD PRESSURE: 75 MMHG | SYSTOLIC BLOOD PRESSURE: 179 MMHG

## 2022-03-21 VITALS — DIASTOLIC BLOOD PRESSURE: 73 MMHG | SYSTOLIC BLOOD PRESSURE: 139 MMHG

## 2022-03-21 VITALS — SYSTOLIC BLOOD PRESSURE: 171 MMHG | DIASTOLIC BLOOD PRESSURE: 63 MMHG

## 2022-03-21 VITALS — DIASTOLIC BLOOD PRESSURE: 56 MMHG | SYSTOLIC BLOOD PRESSURE: 147 MMHG

## 2022-03-21 VITALS — DIASTOLIC BLOOD PRESSURE: 59 MMHG | SYSTOLIC BLOOD PRESSURE: 163 MMHG

## 2022-03-21 VITALS — SYSTOLIC BLOOD PRESSURE: 161 MMHG | DIASTOLIC BLOOD PRESSURE: 64 MMHG

## 2022-03-21 VITALS — SYSTOLIC BLOOD PRESSURE: 139 MMHG | DIASTOLIC BLOOD PRESSURE: 73 MMHG

## 2022-03-21 LAB
ALBUMIN SERPL-MCNC: 2.6 G/DL (ref 3.5–5)
ALBUMIN/GLOB SERPL: 0.6 {RATIO} (ref 0.8–2)
ALP SERPL-CCNC: 77 IU/L (ref 40–150)
ALT SERPL-CCNC: 25 IU/L (ref 0–55)
ANION GAP SERPL CALC-SCNC: 13.3 MMOL/L (ref 8–16)
BASOPHILS # BLD AUTO: 0 10*3/UL (ref 0–0.1)
BASOPHILS NFR BLD AUTO: 0.5 % (ref 0–1)
BUN SERPL-MCNC: 47 MG/DL (ref 7–26)
BUN/CREAT SERPL: 31 (ref 6–25)
CALCIUM SERPL-MCNC: 9.8 MG/DL (ref 8.4–10.2)
CHLORIDE SERPL-SCNC: 97 MMOL/L (ref 98–107)
CO2 SERPL-SCNC: 33 MMOL/L (ref 22–29)
DEPRECATED INR PLAS: 1.18
DEPRECATED NEUTROPHILS # BLD AUTO: 3.8 10*3/UL (ref 2.1–6.9)
EGFRCR SERPLBLD CKD-EPI 2021: 45 ML/MIN (ref 60–?)
EOSINOPHIL # BLD AUTO: 0.2 10*3/UL (ref 0–0.4)
EOSINOPHIL NFR BLD AUTO: 4 % (ref 0–6)
ERYTHROCYTE [DISTWIDTH] IN CORD BLOOD: 16.4 % (ref 11.7–14.4)
GLOBULIN PLAS-MCNC: 4.3 G/DL (ref 2.3–3.5)
GLUCOSE SERPLBLD-MCNC: 232 MG/DL (ref 74–118)
HCT VFR BLD AUTO: 32.7 % (ref 38.2–49.6)
HGB BLD-MCNC: 10.1 G/DL (ref 14–18)
LYMPHOCYTES # BLD: 1.1 10*3/UL (ref 1–3.2)
LYMPHOCYTES NFR BLD AUTO: 19.5 % (ref 18–39.1)
MCH RBC QN AUTO: 26.6 PG (ref 28–32)
MCHC RBC AUTO-ENTMCNC: 30.9 G/DL (ref 31–35)
MCV RBC AUTO: 86.3 FL (ref 81–99)
MONOCYTES # BLD AUTO: 0.5 10*3/UL (ref 0.2–0.8)
MONOCYTES NFR BLD AUTO: 8.6 % (ref 4.4–11.3)
NEUTS SEG NFR BLD AUTO: 66.5 % (ref 38.7–80)
PLATELET # BLD AUTO: 248 X10E3/UL (ref 140–360)
POTASSIUM SERPL-SCNC: 4.3 MMOL/L (ref 3.5–5.1)
PROTHROMBIN TIME: 16 SECONDS (ref 11.9–14.5)
RBC # BLD AUTO: 3.79 X10E6/UL (ref 4.3–5.7)
SODIUM SERPL-SCNC: 139 MMOL/L (ref 136–145)

## 2022-03-21 RX ADMIN — CARVEDILOL SCH MG: 12.5 TABLET, FILM COATED ORAL at 16:40

## 2022-03-21 RX ADMIN — ENOXAPARIN SODIUM SCH MG: 40 INJECTION SUBCUTANEOUS at 16:40

## 2022-03-21 RX ADMIN — TAMSULOSIN HYDROCHLORIDE SCH MG: 0.4 CAPSULE ORAL at 08:59

## 2022-03-21 RX ADMIN — MEROPENEM SCH MLS/HR: 500 INJECTION INTRAVENOUS at 13:00

## 2022-03-21 RX ADMIN — FUROSEMIDE SCH MG: 10 INJECTION, SOLUTION INTRAMUSCULAR; INTRAVENOUS at 06:23

## 2022-03-21 RX ADMIN — INSULIN HUMAN SCH UNIT: 100 INJECTION, SOLUTION PARENTERAL at 12:00

## 2022-03-21 RX ADMIN — ISOSORBIDE DINITRATE SCH MG: 20 TABLET ORAL at 16:40

## 2022-03-21 RX ADMIN — HYDRALAZINE HYDROCHLORIDE SCH MG: 100 TABLET, FILM COATED ORAL at 06:23

## 2022-03-21 RX ADMIN — HYDRALAZINE HYDROCHLORIDE SCH MG: 100 TABLET, FILM COATED ORAL at 12:00

## 2022-03-21 RX ADMIN — FUROSEMIDE SCH MG: 10 INJECTION, SOLUTION INTRAMUSCULAR; INTRAVENOUS at 22:04

## 2022-03-21 RX ADMIN — INSULIN HUMAN SCH UNIT: 100 INJECTION, SOLUTION PARENTERAL at 08:00

## 2022-03-21 RX ADMIN — PREDNISONE SCH MG: 5 TABLET ORAL at 09:00

## 2022-03-21 RX ADMIN — Medication SCH MG: at 09:00

## 2022-03-21 RX ADMIN — HYDRALAZINE HYDROCHLORIDE SCH MG: 100 TABLET, FILM COATED ORAL at 00:51

## 2022-03-21 RX ADMIN — Medication SCH MG: at 16:40

## 2022-03-21 RX ADMIN — AMLODIPINE BESYLATE SCH MG: 5 TABLET ORAL at 21:55

## 2022-03-21 RX ADMIN — OXYCODONE HYDROCHLORIDE AND ACETAMINOPHEN SCH MG: 500 TABLET ORAL at 09:00

## 2022-03-21 RX ADMIN — Medication SCH MG: at 08:59

## 2022-03-21 RX ADMIN — INSULIN HUMAN SCH UNIT: 100 INJECTION, SOLUTION PARENTERAL at 16:48

## 2022-03-21 RX ADMIN — TACROLIMUS SCH MG: 1 CAPSULE, GELATIN COATED ORAL at 09:00

## 2022-03-21 RX ADMIN — HYDRALAZINE HYDROCHLORIDE SCH MG: 100 TABLET, FILM COATED ORAL at 16:40

## 2022-03-21 RX ADMIN — FUROSEMIDE SCH MG: 10 INJECTION, SOLUTION INTRAMUSCULAR; INTRAVENOUS at 15:00

## 2022-03-21 RX ADMIN — MEROPENEM SCH MLS/HR: 500 INJECTION INTRAVENOUS at 00:51

## 2022-03-21 RX ADMIN — ATORVASTATIN CALCIUM SCH MG: 20 TABLET, FILM COATED ORAL at 21:55

## 2022-03-21 RX ADMIN — TACROLIMUS SCH MG: 1 CAPSULE, GELATIN COATED ORAL at 21:55

## 2022-03-21 RX ADMIN — MYCOPHENOLATE MOFETIL SCH MG: 250 CAPSULE ORAL at 16:39

## 2022-03-21 RX ADMIN — CARVEDILOL SCH MG: 12.5 TABLET, FILM COATED ORAL at 08:58

## 2022-03-21 RX ADMIN — ISOSORBIDE DINITRATE SCH MG: 20 TABLET ORAL at 08:59

## 2022-03-21 RX ADMIN — FAMOTIDINE SCH MG: 20 TABLET, FILM COATED ORAL at 09:00

## 2022-03-21 RX ADMIN — OXYCODONE HYDROCHLORIDE AND ACETAMINOPHEN SCH MG: 500 TABLET ORAL at 16:40

## 2022-03-21 RX ADMIN — INSULIN LISPRO SCH UNIT: 100 INJECTION, SOLUTION INTRAVENOUS; SUBCUTANEOUS at 21:55

## 2022-03-21 RX ADMIN — MYCOPHENOLATE MOFETIL SCH MG: 250 CAPSULE ORAL at 08:58

## 2022-03-21 RX ADMIN — Medication SCH MG: at 08:58

## 2022-03-22 VITALS — DIASTOLIC BLOOD PRESSURE: 55 MMHG | SYSTOLIC BLOOD PRESSURE: 140 MMHG

## 2022-03-22 VITALS — DIASTOLIC BLOOD PRESSURE: 54 MMHG | SYSTOLIC BLOOD PRESSURE: 142 MMHG

## 2022-03-22 VITALS — SYSTOLIC BLOOD PRESSURE: 142 MMHG | DIASTOLIC BLOOD PRESSURE: 54 MMHG

## 2022-03-22 VITALS — SYSTOLIC BLOOD PRESSURE: 124 MMHG | DIASTOLIC BLOOD PRESSURE: 69 MMHG

## 2022-03-22 VITALS — DIASTOLIC BLOOD PRESSURE: 66 MMHG | SYSTOLIC BLOOD PRESSURE: 149 MMHG

## 2022-03-22 VITALS — SYSTOLIC BLOOD PRESSURE: 170 MMHG | DIASTOLIC BLOOD PRESSURE: 63 MMHG

## 2022-03-22 VITALS — SYSTOLIC BLOOD PRESSURE: 157 MMHG | DIASTOLIC BLOOD PRESSURE: 61 MMHG

## 2022-03-22 LAB
ALBUMIN SERPL-MCNC: 2.7 G/DL (ref 3.5–5)
ALBUMIN/GLOB SERPL: 0.6 {RATIO} (ref 0.8–2)
ALP SERPL-CCNC: 75 IU/L (ref 40–150)
ALT SERPL-CCNC: 26 IU/L (ref 0–55)
ANION GAP SERPL CALC-SCNC: 15.4 MMOL/L (ref 8–16)
BASOPHILS # BLD AUTO: 0 10*3/UL (ref 0–0.1)
BASOPHILS NFR BLD AUTO: 0.6 % (ref 0–1)
BUN SERPL-MCNC: 47 MG/DL (ref 7–26)
BUN/CREAT SERPL: 34 (ref 6–25)
CALCIUM SERPL-MCNC: 10.1 MG/DL (ref 8.4–10.2)
CHLORIDE SERPL-SCNC: 96 MMOL/L (ref 98–107)
CO2 SERPL-SCNC: 33 MMOL/L (ref 22–29)
DEPRECATED NEUTROPHILS # BLD AUTO: 3.4 10*3/UL (ref 2.1–6.9)
EGFRCR SERPLBLD CKD-EPI 2021: 51 ML/MIN (ref 60–?)
EOSINOPHIL # BLD AUTO: 0.2 10*3/UL (ref 0–0.4)
EOSINOPHIL NFR BLD AUTO: 3.4 % (ref 0–6)
ERYTHROCYTE [DISTWIDTH] IN CORD BLOOD: 16.3 % (ref 11.7–14.4)
GLOBULIN PLAS-MCNC: 4.5 G/DL (ref 2.3–3.5)
GLUCOSE SERPLBLD-MCNC: 198 MG/DL (ref 74–118)
HCT VFR BLD AUTO: 33.9 % (ref 38.2–49.6)
HGB BLD-MCNC: 10.4 G/DL (ref 14–18)
LYMPHOCYTES # BLD: 1.2 10*3/UL (ref 1–3.2)
LYMPHOCYTES NFR BLD AUTO: 22.3 % (ref 18–39.1)
MCH RBC QN AUTO: 26.7 PG (ref 28–32)
MCHC RBC AUTO-ENTMCNC: 30.7 G/DL (ref 31–35)
MCV RBC AUTO: 87.1 FL (ref 81–99)
MONOCYTES # BLD AUTO: 0.5 10*3/UL (ref 0.2–0.8)
MONOCYTES NFR BLD AUTO: 9.4 % (ref 4.4–11.3)
NEUTS SEG NFR BLD AUTO: 63.9 % (ref 38.7–80)
PLATELET # BLD AUTO: 270 X10E3/UL (ref 140–360)
POTASSIUM SERPL-SCNC: 4.4 MMOL/L (ref 3.5–5.1)
RBC # BLD AUTO: 3.89 X10E6/UL (ref 4.3–5.7)
SODIUM SERPL-SCNC: 140 MMOL/L (ref 136–145)

## 2022-03-22 PROCEDURE — 02H633Z INSERTION OF INFUSION DEVICE INTO RIGHT ATRIUM, PERCUTANEOUS APPROACH: ICD-10-PCS

## 2022-03-22 PROCEDURE — 0JH63XZ INSERTION OF TUNNELED VASCULAR ACCESS DEVICE INTO CHEST SUBCUTANEOUS TISSUE AND FASCIA, PERCUTANEOUS APPROACH: ICD-10-PCS

## 2022-03-22 RX ADMIN — MEROPENEM SCH MLS/HR: 500 INJECTION INTRAVENOUS at 00:19

## 2022-03-22 RX ADMIN — ISOSORBIDE DINITRATE SCH MG: 20 TABLET ORAL at 09:00

## 2022-03-22 RX ADMIN — Medication SCH MG: at 14:47

## 2022-03-22 RX ADMIN — FUROSEMIDE SCH MG: 10 INJECTION, SOLUTION INTRAMUSCULAR; INTRAVENOUS at 05:20

## 2022-03-22 RX ADMIN — CARVEDILOL SCH MG: 12.5 TABLET, FILM COATED ORAL at 16:10

## 2022-03-22 RX ADMIN — MEROPENEM SCH MLS/HR: 500 INJECTION INTRAVENOUS at 14:48

## 2022-03-22 RX ADMIN — HYDRALAZINE HYDROCHLORIDE SCH MG: 100 TABLET, FILM COATED ORAL at 16:12

## 2022-03-22 RX ADMIN — OXYCODONE HYDROCHLORIDE AND ACETAMINOPHEN SCH MG: 500 TABLET ORAL at 16:12

## 2022-03-22 RX ADMIN — Medication SCH MG: at 16:12

## 2022-03-22 RX ADMIN — MYCOPHENOLATE MOFETIL SCH MG: 250 CAPSULE ORAL at 14:45

## 2022-03-22 RX ADMIN — PREDNISONE SCH MG: 5 TABLET ORAL at 14:47

## 2022-03-22 RX ADMIN — CARVEDILOL SCH MG: 12.5 TABLET, FILM COATED ORAL at 08:00

## 2022-03-22 RX ADMIN — TAMSULOSIN HYDROCHLORIDE SCH MG: 0.4 CAPSULE ORAL at 14:45

## 2022-03-22 RX ADMIN — INSULIN LISPRO SCH UNIT: 100 INJECTION, SOLUTION INTRAVENOUS; SUBCUTANEOUS at 07:30

## 2022-03-22 RX ADMIN — HYDRALAZINE HYDROCHLORIDE SCH MG: 100 TABLET, FILM COATED ORAL at 14:48

## 2022-03-22 RX ADMIN — TACROLIMUS SCH MG: 1 CAPSULE, GELATIN COATED ORAL at 22:06

## 2022-03-22 RX ADMIN — AMLODIPINE BESYLATE SCH MG: 5 TABLET ORAL at 22:06

## 2022-03-22 RX ADMIN — OXYCODONE HYDROCHLORIDE AND ACETAMINOPHEN SCH MG: 500 TABLET ORAL at 09:00

## 2022-03-22 RX ADMIN — ENOXAPARIN SODIUM SCH MG: 40 INJECTION SUBCUTANEOUS at 16:12

## 2022-03-22 RX ADMIN — HYDRALAZINE HYDROCHLORIDE SCH MG: 100 TABLET, FILM COATED ORAL at 00:13

## 2022-03-22 RX ADMIN — Medication SCH MG: at 09:00

## 2022-03-22 RX ADMIN — HYDRALAZINE HYDROCHLORIDE SCH MG: 100 TABLET, FILM COATED ORAL at 04:24

## 2022-03-22 RX ADMIN — FUROSEMIDE SCH MG: 10 INJECTION, SOLUTION INTRAMUSCULAR; INTRAVENOUS at 14:48

## 2022-03-22 RX ADMIN — TACROLIMUS SCH MG: 1 CAPSULE, GELATIN COATED ORAL at 14:47

## 2022-03-22 RX ADMIN — Medication SCH MG: at 16:10

## 2022-03-22 RX ADMIN — ATORVASTATIN CALCIUM SCH MG: 20 TABLET, FILM COATED ORAL at 22:06

## 2022-03-22 RX ADMIN — INSULIN LISPRO SCH UNIT: 100 INJECTION, SOLUTION INTRAVENOUS; SUBCUTANEOUS at 11:30

## 2022-03-22 RX ADMIN — INSULIN GLARGINE SCH UNITS: 100 INJECTION, SOLUTION SUBCUTANEOUS at 17:39

## 2022-03-22 RX ADMIN — ISOSORBIDE DINITRATE SCH MG: 20 TABLET ORAL at 16:11

## 2022-03-22 RX ADMIN — INSULIN LISPRO SCH UNIT: 100 INJECTION, SOLUTION INTRAVENOUS; SUBCUTANEOUS at 16:30

## 2022-03-22 RX ADMIN — Medication SCH MG: at 14:45

## 2022-03-22 RX ADMIN — INSULIN LISPRO SCH UNIT: 100 INJECTION, SOLUTION INTRAVENOUS; SUBCUTANEOUS at 21:55

## 2022-03-22 RX ADMIN — FAMOTIDINE SCH MG: 20 TABLET, FILM COATED ORAL at 14:47

## 2022-03-22 RX ADMIN — MYCOPHENOLATE MOFETIL SCH MG: 250 CAPSULE ORAL at 16:10

## 2022-03-23 VITALS — DIASTOLIC BLOOD PRESSURE: 56 MMHG | SYSTOLIC BLOOD PRESSURE: 136 MMHG

## 2022-03-23 VITALS — DIASTOLIC BLOOD PRESSURE: 60 MMHG | SYSTOLIC BLOOD PRESSURE: 154 MMHG

## 2022-03-23 VITALS — SYSTOLIC BLOOD PRESSURE: 136 MMHG | DIASTOLIC BLOOD PRESSURE: 56 MMHG

## 2022-03-23 VITALS — DIASTOLIC BLOOD PRESSURE: 54 MMHG | SYSTOLIC BLOOD PRESSURE: 137 MMHG

## 2022-03-23 VITALS — SYSTOLIC BLOOD PRESSURE: 164 MMHG | DIASTOLIC BLOOD PRESSURE: 72 MMHG

## 2022-03-23 LAB
ALBUMIN SERPL-MCNC: 2.7 G/DL (ref 3.5–5)
ALBUMIN/GLOB SERPL: 0.6 {RATIO} (ref 0.8–2)
ALP SERPL-CCNC: 71 IU/L (ref 40–150)
ALT SERPL-CCNC: 30 IU/L (ref 0–55)
ANION GAP SERPL CALC-SCNC: 12.4 MMOL/L (ref 8–16)
BASOPHILS # BLD AUTO: 0 10*3/UL (ref 0–0.1)
BASOPHILS NFR BLD AUTO: 0.7 % (ref 0–1)
BUN SERPL-MCNC: 47 MG/DL (ref 7–26)
BUN/CREAT SERPL: 34 (ref 6–25)
CALCIUM SERPL-MCNC: 10.1 MG/DL (ref 8.4–10.2)
CHLORIDE SERPL-SCNC: 97 MMOL/L (ref 98–107)
CO2 SERPL-SCNC: 36 MMOL/L (ref 22–29)
DEPRECATED NEUTROPHILS # BLD AUTO: 3.5 10*3/UL (ref 2.1–6.9)
EGFRCR SERPLBLD CKD-EPI 2021: 52 ML/MIN (ref 60–?)
EOSINOPHIL # BLD AUTO: 0.1 10*3/UL (ref 0–0.4)
EOSINOPHIL NFR BLD AUTO: 1.8 % (ref 0–6)
ERYTHROCYTE [DISTWIDTH] IN CORD BLOOD: 16 % (ref 11.7–14.4)
GLOBULIN PLAS-MCNC: 4.8 G/DL (ref 2.3–3.5)
GLUCOSE SERPLBLD-MCNC: 157 MG/DL (ref 74–118)
HCT VFR BLD AUTO: 34.1 % (ref 38.2–49.6)
HGB BLD-MCNC: 10.5 G/DL (ref 14–18)
LYMPHOCYTES # BLD: 1.3 10*3/UL (ref 1–3.2)
LYMPHOCYTES NFR BLD AUTO: 23.3 % (ref 18–39.1)
MCH RBC QN AUTO: 26.6 PG (ref 28–32)
MCHC RBC AUTO-ENTMCNC: 30.8 G/DL (ref 31–35)
MCV RBC AUTO: 86.3 FL (ref 81–99)
MONOCYTES # BLD AUTO: 0.6 10*3/UL (ref 0.2–0.8)
MONOCYTES NFR BLD AUTO: 10.6 % (ref 4.4–11.3)
NEUTS SEG NFR BLD AUTO: 62.7 % (ref 38.7–80)
PLATELET # BLD AUTO: 257 X10E3/UL (ref 140–360)
POTASSIUM SERPL-SCNC: 4.4 MMOL/L (ref 3.5–5.1)
RBC # BLD AUTO: 3.95 X10E6/UL (ref 4.3–5.7)
SODIUM SERPL-SCNC: 141 MMOL/L (ref 136–145)

## 2022-03-23 RX ADMIN — INSULIN LISPRO SCH UNIT: 100 INJECTION, SOLUTION INTRAVENOUS; SUBCUTANEOUS at 13:14

## 2022-03-23 RX ADMIN — INSULIN GLARGINE SCH UNITS: 100 INJECTION, SOLUTION SUBCUTANEOUS at 09:00

## 2022-03-23 RX ADMIN — Medication SCH MG: at 09:17

## 2022-03-23 RX ADMIN — Medication SCH MG: at 09:16

## 2022-03-23 RX ADMIN — MEROPENEM SCH MLS/HR: 500 INJECTION INTRAVENOUS at 13:10

## 2022-03-23 RX ADMIN — ISOSORBIDE DINITRATE SCH MG: 20 TABLET ORAL at 09:17

## 2022-03-23 RX ADMIN — MYCOPHENOLATE MOFETIL SCH MG: 250 CAPSULE ORAL at 09:16

## 2022-03-23 RX ADMIN — MEROPENEM SCH MLS/HR: 500 INJECTION INTRAVENOUS at 00:58

## 2022-03-23 RX ADMIN — HYDRALAZINE HYDROCHLORIDE SCH MG: 100 TABLET, FILM COATED ORAL at 06:49

## 2022-03-23 RX ADMIN — TACROLIMUS SCH MG: 1 CAPSULE, GELATIN COATED ORAL at 09:17

## 2022-03-23 RX ADMIN — HYDRALAZINE HYDROCHLORIDE SCH MG: 100 TABLET, FILM COATED ORAL at 00:58

## 2022-03-23 RX ADMIN — TAMSULOSIN HYDROCHLORIDE SCH MG: 0.4 CAPSULE ORAL at 09:16

## 2022-03-23 RX ADMIN — INSULIN LISPRO SCH UNIT: 100 INJECTION, SOLUTION INTRAVENOUS; SUBCUTANEOUS at 09:00

## 2022-03-23 RX ADMIN — PREDNISONE SCH MG: 5 TABLET ORAL at 09:17

## 2022-03-23 RX ADMIN — CARVEDILOL SCH MG: 12.5 TABLET, FILM COATED ORAL at 09:16

## 2022-03-23 RX ADMIN — OXYCODONE HYDROCHLORIDE AND ACETAMINOPHEN SCH MG: 500 TABLET ORAL at 09:17

## 2022-03-23 RX ADMIN — FAMOTIDINE SCH MG: 20 TABLET, FILM COATED ORAL at 09:17

## 2022-03-23 RX ADMIN — HYDRALAZINE HYDROCHLORIDE SCH MG: 100 TABLET, FILM COATED ORAL at 13:10

## 2022-04-08 ENCOUNTER — HOSPITAL ENCOUNTER (EMERGENCY)
Dept: HOSPITAL 88 - ER | Age: 67
Discharge: HOME | End: 2022-04-08
Payer: MEDICARE

## 2022-04-08 VITALS — HEIGHT: 67 IN | WEIGHT: 235 LBS | BODY MASS INDEX: 36.88 KG/M2

## 2022-04-08 DIAGNOSIS — I50.9: ICD-10-CM

## 2022-04-08 DIAGNOSIS — E11.22: ICD-10-CM

## 2022-04-08 DIAGNOSIS — N18.6: ICD-10-CM

## 2022-04-08 DIAGNOSIS — Z99.2: ICD-10-CM

## 2022-04-08 DIAGNOSIS — Z45.2: Primary | ICD-10-CM

## 2022-04-08 DIAGNOSIS — I12.0: ICD-10-CM

## 2022-04-08 PROCEDURE — 36589 REMOVAL TUNNELED CV CATH: CPT

## 2022-04-08 PROCEDURE — 74470 X-RAY EXAM OF KIDNEY LESION: CPT

## 2022-04-08 PROCEDURE — 99283 EMERGENCY DEPT VISIT LOW MDM: CPT

## 2022-04-08 PROCEDURE — 71045 X-RAY EXAM CHEST 1 VIEW: CPT

## 2023-01-16 ENCOUNTER — HOSPITAL ENCOUNTER (INPATIENT)
Dept: HOSPITAL 88 - ER | Age: 68
LOS: 8 days | Discharge: HOME HEALTH | DRG: 871 | End: 2023-01-24
Attending: INTERNAL MEDICINE | Admitting: INTERNAL MEDICINE
Payer: MEDICARE

## 2023-01-16 VITALS — HEIGHT: 69 IN | BODY MASS INDEX: 32.58 KG/M2 | WEIGHT: 220 LBS

## 2023-01-16 DIAGNOSIS — J18.9: ICD-10-CM

## 2023-01-16 DIAGNOSIS — E03.9: ICD-10-CM

## 2023-01-16 DIAGNOSIS — Z20.822: ICD-10-CM

## 2023-01-16 DIAGNOSIS — I12.9: ICD-10-CM

## 2023-01-16 DIAGNOSIS — N18.30: ICD-10-CM

## 2023-01-16 DIAGNOSIS — N39.0: ICD-10-CM

## 2023-01-16 DIAGNOSIS — Z87.891: ICD-10-CM

## 2023-01-16 DIAGNOSIS — Z16.12: ICD-10-CM

## 2023-01-16 DIAGNOSIS — E11.22: ICD-10-CM

## 2023-01-16 DIAGNOSIS — E11.51: ICD-10-CM

## 2023-01-16 DIAGNOSIS — T86.11: ICD-10-CM

## 2023-01-16 DIAGNOSIS — L97.512: ICD-10-CM

## 2023-01-16 DIAGNOSIS — E11.69: ICD-10-CM

## 2023-01-16 DIAGNOSIS — E11.42: ICD-10-CM

## 2023-01-16 DIAGNOSIS — A41.59: Primary | ICD-10-CM

## 2023-01-16 DIAGNOSIS — E86.0: ICD-10-CM

## 2023-01-16 DIAGNOSIS — R31.9: ICD-10-CM

## 2023-01-16 DIAGNOSIS — E78.2: ICD-10-CM

## 2023-01-16 DIAGNOSIS — E11.621: ICD-10-CM

## 2023-01-16 LAB
ALBUMIN SERPL-MCNC: 4 G/DL (ref 3.5–5)
ALBUMIN/GLOB SERPL: 0.8 {RATIO} (ref 0.8–2)
ALP SERPL-CCNC: 79 IU/L (ref 40–150)
ALT SERPL-CCNC: 33 IU/L (ref 0–55)
ANION GAP SERPL CALC-SCNC: 16.7 MMOL/L (ref 8–16)
BACTERIA URNS QL MICRO: (no result) /HPF
BASOPHILS # BLD AUTO: 0 10*3/UL (ref 0–0.1)
BASOPHILS NFR BLD AUTO: 0.2 % (ref 0–1)
BUN SERPL-MCNC: 27 MG/DL (ref 7–26)
BUN/CREAT SERPL: 24 (ref 6–25)
CALCIUM SERPL-MCNC: 9.9 MG/DL (ref 8.4–10.2)
CHLORIDE SERPL-SCNC: 103 MMOL/L (ref 98–107)
CK MB SERPL-MCNC: 1.3 NG/ML (ref 0–5)
CK SERPL-CCNC: 40 IU/L (ref 30–200)
CLARITY UR: (no result)
CO2 SERPL-SCNC: 22 MMOL/L (ref 22–29)
COLOR UR: (no result)
DEPRECATED NEUTROPHILS # BLD AUTO: 8.3 10*3/UL (ref 2.1–6.9)
DEPRECATED RBC URNS MANUAL-ACNC: >50 /HPF (ref 0–5)
EOSINOPHIL # BLD AUTO: 0 10*3/UL (ref 0–0.4)
EOSINOPHIL NFR BLD AUTO: 0.1 % (ref 0–6)
EPI CELLS URNS QL MICRO: (no result) /LPF
ERYTHROCYTE [DISTWIDTH] IN CORD BLOOD: 14 % (ref 11.7–14.4)
GLOBULIN PLAS-MCNC: 4.8 G/DL (ref 2.3–3.5)
GLUCOSE SERPLBLD-MCNC: 193 MG/DL (ref 74–118)
HCT VFR BLD AUTO: 46.3 % (ref 38.2–49.6)
HGB BLD-MCNC: 14 G/DL (ref 14–18)
KETONES UR QL STRIP.AUTO: NEGATIVE
LEUKOCYTE ESTERASE UR QL STRIP.AUTO: (no result)
LYMPHOCYTES # BLD: 0.5 10*3/UL (ref 1–3.2)
LYMPHOCYTES NFR BLD AUTO: 5.3 % (ref 18–39.1)
MCH RBC QN AUTO: 28.3 PG (ref 28–32)
MCHC RBC AUTO-ENTMCNC: 30.2 G/DL (ref 31–35)
MCV RBC AUTO: 93.5 FL (ref 81–99)
MONOCYTES # BLD AUTO: 0.6 10*3/UL (ref 0.2–0.8)
MONOCYTES NFR BLD AUTO: 6.1 % (ref 4.4–11.3)
NEUTS SEG NFR BLD AUTO: 87.3 % (ref 38.7–80)
NITRITE UR QL STRIP.AUTO: NEGATIVE
PLATELET # BLD AUTO: 189 X10E3/UL (ref 140–360)
POTASSIUM SERPL-SCNC: 4.7 MMOL/L (ref 3.5–5.1)
PROT UR QL STRIP.AUTO: >=300
RBC # BLD AUTO: 4.95 X10E6/UL (ref 4.3–5.7)
SODIUM SERPL-SCNC: 137 MMOL/L (ref 136–145)
SP GR UR STRIP: 1.02 (ref 1.01–1.02)
UROBILINOGEN UR STRIP-MCNC: 1 MG/DL (ref 0.2–1)
WBC #/AREA URNS HPF: (no result) /HPF (ref 0–5)

## 2023-01-16 PROCEDURE — 99284 EMERGENCY DEPT VISIT MOD MDM: CPT

## 2023-01-16 PROCEDURE — 83605 ASSAY OF LACTIC ACID: CPT

## 2023-01-16 PROCEDURE — 84484 ASSAY OF TROPONIN QUANT: CPT

## 2023-01-16 PROCEDURE — 82553 CREATINE MB FRACTION: CPT

## 2023-01-16 PROCEDURE — 87040 BLOOD CULTURE FOR BACTERIA: CPT

## 2023-01-16 PROCEDURE — 81001 URINALYSIS AUTO W/SCOPE: CPT

## 2023-01-16 PROCEDURE — 87086 URINE CULTURE/COLONY COUNT: CPT

## 2023-01-16 PROCEDURE — 94799 UNLISTED PULMONARY SVC/PX: CPT

## 2023-01-16 PROCEDURE — 80048 BASIC METABOLIC PNL TOTAL CA: CPT

## 2023-01-16 PROCEDURE — 82550 ASSAY OF CK (CPK): CPT

## 2023-01-16 PROCEDURE — 36415 COLL VENOUS BLD VENIPUNCTURE: CPT

## 2023-01-16 PROCEDURE — 76700 US EXAM ABDOM COMPLETE: CPT

## 2023-01-16 PROCEDURE — 93005 ELECTROCARDIOGRAM TRACING: CPT

## 2023-01-16 PROCEDURE — 85025 COMPLETE CBC W/AUTO DIFF WBC: CPT

## 2023-01-16 PROCEDURE — 99252 IP/OBS CONSLTJ NEW/EST SF 35: CPT

## 2023-01-16 PROCEDURE — 87186 SC STD MICRODIL/AGAR DIL: CPT

## 2023-01-16 PROCEDURE — 80053 COMPREHEN METABOLIC PANEL: CPT

## 2023-01-16 PROCEDURE — 82948 REAGENT STRIP/BLOOD GLUCOSE: CPT

## 2023-01-16 PROCEDURE — 71045 X-RAY EXAM CHEST 1 VIEW: CPT

## 2023-01-16 PROCEDURE — 80197 ASSAY OF TACROLIMUS: CPT

## 2023-01-16 PROCEDURE — 70450 CT HEAD/BRAIN W/O DYE: CPT

## 2023-01-17 VITALS — DIASTOLIC BLOOD PRESSURE: 70 MMHG | SYSTOLIC BLOOD PRESSURE: 148 MMHG

## 2023-01-17 VITALS — DIASTOLIC BLOOD PRESSURE: 74 MMHG | SYSTOLIC BLOOD PRESSURE: 192 MMHG

## 2023-01-17 VITALS — DIASTOLIC BLOOD PRESSURE: 59 MMHG | SYSTOLIC BLOOD PRESSURE: 143 MMHG

## 2023-01-17 VITALS — SYSTOLIC BLOOD PRESSURE: 192 MMHG | DIASTOLIC BLOOD PRESSURE: 65 MMHG

## 2023-01-17 VITALS — DIASTOLIC BLOOD PRESSURE: 68 MMHG | SYSTOLIC BLOOD PRESSURE: 193 MMHG

## 2023-01-17 VITALS — DIASTOLIC BLOOD PRESSURE: 101 MMHG | SYSTOLIC BLOOD PRESSURE: 142 MMHG

## 2023-01-17 VITALS — SYSTOLIC BLOOD PRESSURE: 156 MMHG | DIASTOLIC BLOOD PRESSURE: 69 MMHG

## 2023-01-17 VITALS — SYSTOLIC BLOOD PRESSURE: 142 MMHG | DIASTOLIC BLOOD PRESSURE: 101 MMHG

## 2023-01-17 VITALS — SYSTOLIC BLOOD PRESSURE: 121 MMHG | DIASTOLIC BLOOD PRESSURE: 62 MMHG

## 2023-01-17 LAB
ALBUMIN SERPL-MCNC: 3.4 G/DL (ref 3.5–5)
ALBUMIN/GLOB SERPL: 0.8 {RATIO} (ref 0.8–2)
ALP SERPL-CCNC: 66 IU/L (ref 40–150)
ALT SERPL-CCNC: 30 IU/L (ref 0–55)
ANION GAP SERPL CALC-SCNC: 12.2 MMOL/L (ref 8–16)
BASOPHILS # BLD AUTO: 0 10*3/UL (ref 0–0.1)
BASOPHILS NFR BLD AUTO: 0.2 % (ref 0–1)
BUN SERPL-MCNC: 22 MG/DL (ref 7–26)
BUN/CREAT SERPL: 21 (ref 6–25)
CALCIUM SERPL-MCNC: 9.3 MG/DL (ref 8.4–10.2)
CHLORIDE SERPL-SCNC: 108 MMOL/L (ref 98–107)
CK MB SERPL-MCNC: 0.9 NG/ML (ref 0–5)
CK MB SERPL-MCNC: 0.9 NG/ML (ref 0–5)
CK SERPL-CCNC: 42 IU/L (ref 30–200)
CK SERPL-CCNC: 48 IU/L (ref 30–200)
CO2 SERPL-SCNC: 22 MMOL/L (ref 22–29)
DEPRECATED NEUTROPHILS # BLD AUTO: 7.5 10*3/UL (ref 2.1–6.9)
EOSINOPHIL # BLD AUTO: 0 10*3/UL (ref 0–0.4)
EOSINOPHIL NFR BLD AUTO: 0 % (ref 0–6)
ERYTHROCYTE [DISTWIDTH] IN CORD BLOOD: 13.4 % (ref 11.7–14.4)
GLOBULIN PLAS-MCNC: 4.1 G/DL (ref 2.3–3.5)
GLUCOSE SERPLBLD-MCNC: 226 MG/DL (ref 74–118)
HCT VFR BLD AUTO: 42.5 % (ref 38.2–49.6)
HGB BLD-MCNC: 12.9 G/DL (ref 14–18)
LYMPHOCYTES # BLD: 0.8 10*3/UL (ref 1–3.2)
LYMPHOCYTES NFR BLD AUTO: 8.7 % (ref 18–39.1)
MCH RBC QN AUTO: 28.3 PG (ref 28–32)
MCHC RBC AUTO-ENTMCNC: 30.4 G/DL (ref 31–35)
MCV RBC AUTO: 93.2 FL (ref 81–99)
MONOCYTES # BLD AUTO: 0.6 10*3/UL (ref 0.2–0.8)
MONOCYTES NFR BLD AUTO: 5.9 % (ref 4.4–11.3)
NEUTS SEG NFR BLD AUTO: 78.8 % (ref 38.7–80)
PLATELET # BLD AUTO: 155 X10E3/UL (ref 140–360)
POTASSIUM SERPL-SCNC: 4.2 MMOL/L (ref 3.5–5.1)
RBC # BLD AUTO: 4.56 X10E6/UL (ref 4.3–5.7)
SODIUM SERPL-SCNC: 138 MMOL/L (ref 136–145)

## 2023-01-17 RX ADMIN — HYDRALAZINE HYDROCHLORIDE SCH MG: 25 TABLET ORAL at 21:11

## 2023-01-17 RX ADMIN — ISOSORBIDE DINITRATE SCH MG: 20 TABLET ORAL at 08:51

## 2023-01-17 RX ADMIN — CARVEDILOL SCH MG: 12.5 TABLET, FILM COATED ORAL at 17:16

## 2023-01-17 RX ADMIN — MYCOPHENOLATE MOFETIL SCH MG: 250 CAPSULE ORAL at 21:12

## 2023-01-17 RX ADMIN — Medication SCH MG: at 08:52

## 2023-01-17 RX ADMIN — INSULIN HUMAN SCH UNIT: 100 INJECTION, SOLUTION PARENTERAL at 21:00

## 2023-01-17 RX ADMIN — HYDRALAZINE HYDROCHLORIDE SCH MG: 25 TABLET ORAL at 15:00

## 2023-01-17 RX ADMIN — INSULIN HUMAN SCH UNIT: 100 INJECTION, SOLUTION PARENTERAL at 15:59

## 2023-01-17 RX ADMIN — INSULIN HUMAN SCH UNIT: 100 INJECTION, SOLUTION PARENTERAL at 08:58

## 2023-01-17 RX ADMIN — HYDRALAZINE HYDROCHLORIDE SCH MG: 25 TABLET ORAL at 08:52

## 2023-01-17 RX ADMIN — MYCOPHENOLATE MOFETIL SCH MG: 250 CAPSULE ORAL at 10:01

## 2023-01-17 RX ADMIN — CARVEDILOL SCH MG: 12.5 TABLET, FILM COATED ORAL at 10:01

## 2023-01-17 RX ADMIN — INSULIN HUMAN SCH UNIT: 100 INJECTION, SOLUTION PARENTERAL at 11:56

## 2023-01-17 RX ADMIN — FAMOTIDINE SCH MG: 20 TABLET, FILM COATED ORAL at 17:15

## 2023-01-17 RX ADMIN — TACROLIMUS SCH MG: 1 CAPSULE, GELATIN COATED ORAL at 21:13

## 2023-01-17 RX ADMIN — FAMOTIDINE SCH MG: 20 TABLET, FILM COATED ORAL at 08:52

## 2023-01-17 RX ADMIN — Medication SCH MG: at 08:51

## 2023-01-17 RX ADMIN — TAMSULOSIN HYDROCHLORIDE SCH MG: 0.4 CAPSULE ORAL at 08:51

## 2023-01-17 RX ADMIN — ISOSORBIDE DINITRATE SCH MG: 20 TABLET ORAL at 17:16

## 2023-01-17 RX ADMIN — Medication SCH MG: at 17:16

## 2023-01-18 VITALS — DIASTOLIC BLOOD PRESSURE: 69 MMHG | SYSTOLIC BLOOD PRESSURE: 142 MMHG

## 2023-01-18 VITALS — DIASTOLIC BLOOD PRESSURE: 68 MMHG | SYSTOLIC BLOOD PRESSURE: 189 MMHG

## 2023-01-18 VITALS — DIASTOLIC BLOOD PRESSURE: 67 MMHG | SYSTOLIC BLOOD PRESSURE: 152 MMHG

## 2023-01-18 VITALS — DIASTOLIC BLOOD PRESSURE: 71 MMHG | SYSTOLIC BLOOD PRESSURE: 197 MMHG

## 2023-01-18 VITALS — DIASTOLIC BLOOD PRESSURE: 74 MMHG | SYSTOLIC BLOOD PRESSURE: 109 MMHG

## 2023-01-18 VITALS — DIASTOLIC BLOOD PRESSURE: 75 MMHG | SYSTOLIC BLOOD PRESSURE: 187 MMHG

## 2023-01-18 VITALS — SYSTOLIC BLOOD PRESSURE: 187 MMHG | DIASTOLIC BLOOD PRESSURE: 75 MMHG

## 2023-01-18 LAB
ANION GAP SERPL CALC-SCNC: 14.2 MMOL/L (ref 8–16)
BASOPHILS # BLD AUTO: 0 10*3/UL (ref 0–0.1)
BASOPHILS NFR BLD AUTO: 0.2 % (ref 0–1)
BUN SERPL-MCNC: 23 MG/DL (ref 7–26)
BUN/CREAT SERPL: 21 (ref 6–25)
CALCIUM SERPL-MCNC: 9.7 MG/DL (ref 8.4–10.2)
CHLORIDE SERPL-SCNC: 107 MMOL/L (ref 98–107)
CO2 SERPL-SCNC: 21 MMOL/L (ref 22–29)
DEPRECATED NEUTROPHILS # BLD AUTO: 4.1 10*3/UL (ref 2.1–6.9)
EOSINOPHIL # BLD AUTO: 0 10*3/UL (ref 0–0.4)
EOSINOPHIL NFR BLD AUTO: 0.7 % (ref 0–6)
ERYTHROCYTE [DISTWIDTH] IN CORD BLOOD: 14.3 % (ref 11.7–14.4)
GLUCOSE SERPLBLD-MCNC: 183 MG/DL (ref 74–118)
HCT VFR BLD AUTO: 41.2 % (ref 38.2–49.6)
HGB BLD-MCNC: 12.9 G/DL (ref 14–18)
LYMPHOCYTES # BLD: 1.1 10*3/UL (ref 1–3.2)
LYMPHOCYTES NFR BLD AUTO: 18.7 % (ref 18–39.1)
MCH RBC QN AUTO: 28.2 PG (ref 28–32)
MCHC RBC AUTO-ENTMCNC: 31.3 G/DL (ref 31–35)
MCV RBC AUTO: 90 FL (ref 81–99)
MONOCYTES # BLD AUTO: 0.6 10*3/UL (ref 0.2–0.8)
MONOCYTES NFR BLD AUTO: 10.1 % (ref 4.4–11.3)
NEUTS SEG NFR BLD AUTO: 67.6 % (ref 38.7–80)
PLATELET # BLD AUTO: 151 X10E3/UL (ref 140–360)
POTASSIUM SERPL-SCNC: 4.2 MMOL/L (ref 3.5–5.1)
RBC # BLD AUTO: 4.58 X10E6/UL (ref 4.3–5.7)
SODIUM SERPL-SCNC: 138 MMOL/L (ref 136–145)

## 2023-01-18 RX ADMIN — Medication SCH MG: at 08:52

## 2023-01-18 RX ADMIN — HYDRALAZINE HYDROCHLORIDE SCH MG: 25 TABLET ORAL at 21:43

## 2023-01-18 RX ADMIN — TAMSULOSIN HYDROCHLORIDE SCH MG: 0.4 CAPSULE ORAL at 08:54

## 2023-01-18 RX ADMIN — Medication SCH MG: at 17:03

## 2023-01-18 RX ADMIN — LEVOTHYROXINE SODIUM SCH MCG: 100 TABLET ORAL at 05:41

## 2023-01-18 RX ADMIN — FAMOTIDINE SCH MG: 20 TABLET, FILM COATED ORAL at 08:51

## 2023-01-18 RX ADMIN — TACROLIMUS SCH MG: 1 CAPSULE, GELATIN COATED ORAL at 08:51

## 2023-01-18 RX ADMIN — FAMOTIDINE SCH MG: 20 TABLET, FILM COATED ORAL at 17:04

## 2023-01-18 RX ADMIN — CARVEDILOL SCH MG: 12.5 TABLET, FILM COATED ORAL at 08:53

## 2023-01-18 RX ADMIN — ISOSORBIDE DINITRATE SCH MG: 20 TABLET ORAL at 17:04

## 2023-01-18 RX ADMIN — MYCOPHENOLATE MOFETIL SCH MG: 250 CAPSULE ORAL at 08:52

## 2023-01-18 RX ADMIN — INSULIN HUMAN SCH UNIT: 100 INJECTION, SOLUTION PARENTERAL at 15:51

## 2023-01-18 RX ADMIN — Medication SCH MG: at 08:53

## 2023-01-18 RX ADMIN — PREDNISONE SCH MG: 5 TABLET ORAL at 08:54

## 2023-01-18 RX ADMIN — INSULIN HUMAN SCH UNIT: 100 INJECTION, SOLUTION PARENTERAL at 21:52

## 2023-01-18 RX ADMIN — ISOSORBIDE DINITRATE SCH MG: 20 TABLET ORAL at 08:52

## 2023-01-18 RX ADMIN — INSULIN HUMAN SCH UNIT: 100 INJECTION, SOLUTION PARENTERAL at 08:48

## 2023-01-18 RX ADMIN — TACROLIMUS SCH MG: 1 CAPSULE, GELATIN COATED ORAL at 21:41

## 2023-01-18 RX ADMIN — HYDRALAZINE HYDROCHLORIDE SCH MG: 25 TABLET ORAL at 08:52

## 2023-01-18 RX ADMIN — CARVEDILOL SCH MG: 12.5 TABLET, FILM COATED ORAL at 17:04

## 2023-01-18 RX ADMIN — INSULIN HUMAN SCH UNIT: 100 INJECTION, SOLUTION PARENTERAL at 11:41

## 2023-01-18 RX ADMIN — HYDRALAZINE HYDROCHLORIDE SCH MG: 25 TABLET ORAL at 15:28

## 2023-01-18 RX ADMIN — MYCOPHENOLATE MOFETIL SCH MG: 250 CAPSULE ORAL at 21:42

## 2023-01-19 VITALS — DIASTOLIC BLOOD PRESSURE: 68 MMHG | SYSTOLIC BLOOD PRESSURE: 141 MMHG

## 2023-01-19 VITALS — SYSTOLIC BLOOD PRESSURE: 145 MMHG | DIASTOLIC BLOOD PRESSURE: 70 MMHG

## 2023-01-19 VITALS — SYSTOLIC BLOOD PRESSURE: 190 MMHG | DIASTOLIC BLOOD PRESSURE: 72 MMHG

## 2023-01-19 VITALS — SYSTOLIC BLOOD PRESSURE: 141 MMHG | DIASTOLIC BLOOD PRESSURE: 68 MMHG

## 2023-01-19 VITALS — DIASTOLIC BLOOD PRESSURE: 70 MMHG | SYSTOLIC BLOOD PRESSURE: 153 MMHG

## 2023-01-19 VITALS — DIASTOLIC BLOOD PRESSURE: 62 MMHG | SYSTOLIC BLOOD PRESSURE: 105 MMHG

## 2023-01-19 LAB
ANION GAP SERPL CALC-SCNC: 15 MMOL/L (ref 8–16)
BASOPHILS # BLD AUTO: 0 10*3/UL (ref 0–0.1)
BASOPHILS NFR BLD AUTO: 0.4 % (ref 0–1)
BUN SERPL-MCNC: 27 MG/DL (ref 7–26)
BUN/CREAT SERPL: 23 (ref 6–25)
CALCIUM SERPL-MCNC: 10.5 MG/DL (ref 8.4–10.2)
CHLORIDE SERPL-SCNC: 106 MMOL/L (ref 98–107)
CO2 SERPL-SCNC: 21 MMOL/L (ref 22–29)
DEPRECATED NEUTROPHILS # BLD AUTO: 4 10*3/UL (ref 2.1–6.9)
EOSINOPHIL # BLD AUTO: 0 10*3/UL (ref 0–0.4)
EOSINOPHIL NFR BLD AUTO: 0.5 % (ref 0–6)
ERYTHROCYTE [DISTWIDTH] IN CORD BLOOD: 13.5 % (ref 11.7–14.4)
GLUCOSE SERPLBLD-MCNC: 275 MG/DL (ref 74–118)
HCT VFR BLD AUTO: 42.5 % (ref 38.2–49.6)
HGB BLD-MCNC: 12.6 G/DL (ref 14–18)
LYMPHOCYTES # BLD: 0.9 10*3/UL (ref 1–3.2)
LYMPHOCYTES NFR BLD AUTO: 15.6 % (ref 18–39.1)
MCH RBC QN AUTO: 28.1 PG (ref 28–32)
MCHC RBC AUTO-ENTMCNC: 29.6 G/DL (ref 31–35)
MCV RBC AUTO: 94.7 FL (ref 81–99)
MONOCYTES # BLD AUTO: 0.6 10*3/UL (ref 0.2–0.8)
MONOCYTES NFR BLD AUTO: 10.2 % (ref 4.4–11.3)
NEUTS SEG NFR BLD AUTO: 72.2 % (ref 38.7–80)
PLATELET # BLD AUTO: 164 X10E3/UL (ref 140–360)
POTASSIUM SERPL-SCNC: 4 MMOL/L (ref 3.5–5.1)
RBC # BLD AUTO: 4.49 X10E6/UL (ref 4.3–5.7)
SODIUM SERPL-SCNC: 138 MMOL/L (ref 136–145)

## 2023-01-19 RX ADMIN — HYDRALAZINE HYDROCHLORIDE SCH MG: 25 TABLET ORAL at 16:13

## 2023-01-19 RX ADMIN — MYCOPHENOLATE MOFETIL SCH MG: 250 CAPSULE ORAL at 09:05

## 2023-01-19 RX ADMIN — INSULIN HUMAN SCH UNIT: 100 INJECTION, SOLUTION PARENTERAL at 07:30

## 2023-01-19 RX ADMIN — HYDRALAZINE HYDROCHLORIDE SCH MG: 25 TABLET ORAL at 09:06

## 2023-01-19 RX ADMIN — INSULIN HUMAN SCH UNIT: 100 INJECTION, SOLUTION PARENTERAL at 21:55

## 2023-01-19 RX ADMIN — LEVOTHYROXINE SODIUM SCH MCG: 100 TABLET ORAL at 05:00

## 2023-01-19 RX ADMIN — FAMOTIDINE SCH MG: 20 TABLET, FILM COATED ORAL at 09:06

## 2023-01-19 RX ADMIN — INSULIN HUMAN SCH UNIT: 100 INJECTION, SOLUTION PARENTERAL at 12:13

## 2023-01-19 RX ADMIN — MYCOPHENOLATE MOFETIL SCH MG: 250 CAPSULE ORAL at 21:48

## 2023-01-19 RX ADMIN — Medication SCH MG: at 09:05

## 2023-01-19 RX ADMIN — ISOSORBIDE DINITRATE SCH MG: 20 TABLET ORAL at 09:06

## 2023-01-19 RX ADMIN — TACROLIMUS SCH MG: 1 CAPSULE, GELATIN COATED ORAL at 21:00

## 2023-01-19 RX ADMIN — CARVEDILOL SCH MG: 12.5 TABLET, FILM COATED ORAL at 16:14

## 2023-01-19 RX ADMIN — CARVEDILOL SCH MG: 12.5 TABLET, FILM COATED ORAL at 09:06

## 2023-01-19 RX ADMIN — SODIUM CHLORIDE SCH MLS/HR: 4.5 INJECTION, SOLUTION INTRAVENOUS at 21:50

## 2023-01-19 RX ADMIN — TACROLIMUS SCH MG: 1 CAPSULE, GELATIN COATED ORAL at 23:15

## 2023-01-19 RX ADMIN — TAMSULOSIN HYDROCHLORIDE SCH MG: 0.4 CAPSULE ORAL at 09:06

## 2023-01-19 RX ADMIN — ISOSORBIDE DINITRATE SCH MG: 20 TABLET ORAL at 16:13

## 2023-01-19 RX ADMIN — INSULIN HUMAN SCH UNIT: 100 INJECTION, SOLUTION PARENTERAL at 16:15

## 2023-01-19 RX ADMIN — FAMOTIDINE SCH MG: 20 TABLET, FILM COATED ORAL at 16:14

## 2023-01-19 RX ADMIN — TACROLIMUS SCH MG: 1 CAPSULE, GELATIN COATED ORAL at 09:07

## 2023-01-19 RX ADMIN — PREDNISONE SCH MG: 5 TABLET ORAL at 09:07

## 2023-01-19 RX ADMIN — Medication SCH MG: at 16:14

## 2023-01-19 RX ADMIN — Medication SCH MG: at 09:06

## 2023-01-19 RX ADMIN — HYDRALAZINE HYDROCHLORIDE SCH MG: 25 TABLET ORAL at 21:47

## 2023-01-20 VITALS — DIASTOLIC BLOOD PRESSURE: 74 MMHG | SYSTOLIC BLOOD PRESSURE: 163 MMHG

## 2023-01-20 VITALS — DIASTOLIC BLOOD PRESSURE: 74 MMHG | SYSTOLIC BLOOD PRESSURE: 154 MMHG

## 2023-01-20 VITALS — DIASTOLIC BLOOD PRESSURE: 74 MMHG | SYSTOLIC BLOOD PRESSURE: 165 MMHG

## 2023-01-20 VITALS — DIASTOLIC BLOOD PRESSURE: 67 MMHG | SYSTOLIC BLOOD PRESSURE: 154 MMHG

## 2023-01-20 VITALS — DIASTOLIC BLOOD PRESSURE: 65 MMHG | SYSTOLIC BLOOD PRESSURE: 158 MMHG

## 2023-01-20 VITALS — SYSTOLIC BLOOD PRESSURE: 137 MMHG | DIASTOLIC BLOOD PRESSURE: 68 MMHG

## 2023-01-20 VITALS — SYSTOLIC BLOOD PRESSURE: 163 MMHG | DIASTOLIC BLOOD PRESSURE: 74 MMHG

## 2023-01-20 VITALS — DIASTOLIC BLOOD PRESSURE: 76 MMHG | SYSTOLIC BLOOD PRESSURE: 160 MMHG

## 2023-01-20 LAB
ANION GAP SERPL CALC-SCNC: 16.8 MMOL/L (ref 8–16)
BASOPHILS # BLD AUTO: 0 10*3/UL (ref 0–0.1)
BASOPHILS NFR BLD AUTO: 0.4 % (ref 0–1)
BUN SERPL-MCNC: 25 MG/DL (ref 7–26)
BUN/CREAT SERPL: 26 (ref 6–25)
CALCIUM SERPL-MCNC: 9.9 MG/DL (ref 8.4–10.2)
CHLORIDE SERPL-SCNC: 106 MMOL/L (ref 98–107)
CO2 SERPL-SCNC: 21 MMOL/L (ref 22–29)
DEPRECATED NEUTROPHILS # BLD AUTO: 2.9 10*3/UL (ref 2.1–6.9)
EOSINOPHIL # BLD AUTO: 0 10*3/UL (ref 0–0.4)
EOSINOPHIL NFR BLD AUTO: 0.9 % (ref 0–6)
ERYTHROCYTE [DISTWIDTH] IN CORD BLOOD: 13.2 % (ref 11.7–14.4)
GLUCOSE SERPLBLD-MCNC: 189 MG/DL (ref 74–118)
HCT VFR BLD AUTO: 42.3 % (ref 38.2–49.6)
HGB BLD-MCNC: 12.9 G/DL (ref 14–18)
LYMPHOCYTES # BLD: 1 10*3/UL (ref 1–3.2)
LYMPHOCYTES NFR BLD AUTO: 22.7 % (ref 18–39.1)
MCH RBC QN AUTO: 28.1 PG (ref 28–32)
MCHC RBC AUTO-ENTMCNC: 30.5 G/DL (ref 31–35)
MCV RBC AUTO: 92.2 FL (ref 81–99)
MONOCYTES # BLD AUTO: 0.5 10*3/UL (ref 0.2–0.8)
MONOCYTES NFR BLD AUTO: 11.7 % (ref 4.4–11.3)
NEUTS SEG NFR BLD AUTO: 63.2 % (ref 38.7–80)
PLATELET # BLD AUTO: 169 X10E3/UL (ref 140–360)
POTASSIUM SERPL-SCNC: 3.8 MMOL/L (ref 3.5–5.1)
RBC # BLD AUTO: 4.59 X10E6/UL (ref 4.3–5.7)
SODIUM SERPL-SCNC: 140 MMOL/L (ref 136–145)

## 2023-01-20 RX ADMIN — HYDRALAZINE HYDROCHLORIDE SCH MG: 25 TABLET ORAL at 16:48

## 2023-01-20 RX ADMIN — HYDRALAZINE HYDROCHLORIDE SCH MG: 25 TABLET ORAL at 08:08

## 2023-01-20 RX ADMIN — TACROLIMUS SCH MG: 1 CAPSULE, GELATIN COATED ORAL at 20:32

## 2023-01-20 RX ADMIN — MYCOPHENOLATE MOFETIL SCH MG: 250 CAPSULE ORAL at 20:32

## 2023-01-20 RX ADMIN — PREDNISONE SCH MG: 5 TABLET ORAL at 08:06

## 2023-01-20 RX ADMIN — ISOSORBIDE DINITRATE SCH MG: 20 TABLET ORAL at 08:09

## 2023-01-20 RX ADMIN — FAMOTIDINE SCH MG: 20 TABLET, FILM COATED ORAL at 16:48

## 2023-01-20 RX ADMIN — LEVOTHYROXINE SODIUM SCH MCG: 100 TABLET ORAL at 05:41

## 2023-01-20 RX ADMIN — ISOSORBIDE DINITRATE SCH MG: 20 TABLET ORAL at 16:47

## 2023-01-20 RX ADMIN — TACROLIMUS SCH MG: 1 CAPSULE, GELATIN COATED ORAL at 08:07

## 2023-01-20 RX ADMIN — HYDRALAZINE HYDROCHLORIDE SCH MG: 25 TABLET ORAL at 20:38

## 2023-01-20 RX ADMIN — FAMOTIDINE SCH MG: 20 TABLET, FILM COATED ORAL at 08:06

## 2023-01-20 RX ADMIN — INSULIN HUMAN SCH UNIT: 100 INJECTION, SOLUTION PARENTERAL at 17:00

## 2023-01-20 RX ADMIN — SODIUM CHLORIDE SCH MLS/HR: 4.5 INJECTION, SOLUTION INTRAVENOUS at 17:30

## 2023-01-20 RX ADMIN — INSULIN HUMAN SCH UNIT: 100 INJECTION, SOLUTION PARENTERAL at 22:11

## 2023-01-20 RX ADMIN — INSULIN HUMAN SCH UNIT: 100 INJECTION, SOLUTION PARENTERAL at 12:30

## 2023-01-20 RX ADMIN — MYCOPHENOLATE MOFETIL SCH MG: 250 CAPSULE ORAL at 08:06

## 2023-01-20 RX ADMIN — Medication SCH MG: at 08:07

## 2023-01-20 RX ADMIN — Medication SCH MG: at 08:26

## 2023-01-20 RX ADMIN — TAMSULOSIN HYDROCHLORIDE SCH MG: 0.4 CAPSULE ORAL at 08:06

## 2023-01-20 RX ADMIN — CARVEDILOL SCH MG: 12.5 TABLET, FILM COATED ORAL at 16:48

## 2023-01-20 RX ADMIN — INSULIN HUMAN SCH UNIT: 100 INJECTION, SOLUTION PARENTERAL at 08:30

## 2023-01-20 RX ADMIN — CARVEDILOL SCH MG: 12.5 TABLET, FILM COATED ORAL at 08:08

## 2023-01-21 VITALS — DIASTOLIC BLOOD PRESSURE: 67 MMHG | SYSTOLIC BLOOD PRESSURE: 144 MMHG

## 2023-01-21 VITALS — SYSTOLIC BLOOD PRESSURE: 153 MMHG | DIASTOLIC BLOOD PRESSURE: 68 MMHG

## 2023-01-21 VITALS — DIASTOLIC BLOOD PRESSURE: 67 MMHG | SYSTOLIC BLOOD PRESSURE: 181 MMHG

## 2023-01-21 VITALS — SYSTOLIC BLOOD PRESSURE: 162 MMHG | DIASTOLIC BLOOD PRESSURE: 75 MMHG

## 2023-01-21 VITALS — DIASTOLIC BLOOD PRESSURE: 64 MMHG | SYSTOLIC BLOOD PRESSURE: 151 MMHG

## 2023-01-21 RX ADMIN — INSULIN HUMAN SCH UNIT: 100 INJECTION, SOLUTION PARENTERAL at 08:19

## 2023-01-21 RX ADMIN — TACROLIMUS SCH MG: 1 CAPSULE, GELATIN COATED ORAL at 20:59

## 2023-01-21 RX ADMIN — ISOSORBIDE DINITRATE SCH MG: 20 TABLET ORAL at 16:37

## 2023-01-21 RX ADMIN — HYDRALAZINE HYDROCHLORIDE SCH MG: 25 TABLET ORAL at 08:22

## 2023-01-21 RX ADMIN — MYCOPHENOLATE MOFETIL SCH MG: 250 CAPSULE ORAL at 08:21

## 2023-01-21 RX ADMIN — Medication SCH MG: at 08:21

## 2023-01-21 RX ADMIN — CARVEDILOL SCH MG: 12.5 TABLET, FILM COATED ORAL at 08:23

## 2023-01-21 RX ADMIN — INSULIN HUMAN SCH UNIT: 100 INJECTION, SOLUTION PARENTERAL at 16:34

## 2023-01-21 RX ADMIN — HYDRALAZINE HYDROCHLORIDE SCH MG: 25 TABLET ORAL at 14:16

## 2023-01-21 RX ADMIN — MYCOPHENOLATE MOFETIL SCH MG: 250 CAPSULE ORAL at 20:59

## 2023-01-21 RX ADMIN — CARVEDILOL SCH MG: 12.5 TABLET, FILM COATED ORAL at 16:37

## 2023-01-21 RX ADMIN — ISOSORBIDE DINITRATE SCH MG: 20 TABLET ORAL at 08:25

## 2023-01-21 RX ADMIN — INSULIN HUMAN SCH UNIT: 100 INJECTION, SOLUTION PARENTERAL at 21:08

## 2023-01-21 RX ADMIN — LEVOTHYROXINE SODIUM SCH MCG: 100 TABLET ORAL at 05:03

## 2023-01-21 RX ADMIN — TAMSULOSIN HYDROCHLORIDE SCH MG: 0.4 CAPSULE ORAL at 08:21

## 2023-01-21 RX ADMIN — PREDNISONE SCH MG: 5 TABLET ORAL at 08:23

## 2023-01-21 RX ADMIN — SODIUM CHLORIDE SCH MLS/HR: 4.5 INJECTION, SOLUTION INTRAVENOUS at 10:14

## 2023-01-21 RX ADMIN — HYDRALAZINE HYDROCHLORIDE SCH MG: 25 TABLET ORAL at 21:00

## 2023-01-21 RX ADMIN — TACROLIMUS SCH MG: 1 CAPSULE, GELATIN COATED ORAL at 08:21

## 2023-01-21 RX ADMIN — INSULIN HUMAN SCH UNIT: 100 INJECTION, SOLUTION PARENTERAL at 11:15

## 2023-01-21 RX ADMIN — FAMOTIDINE SCH MG: 20 TABLET, FILM COATED ORAL at 16:37

## 2023-01-21 RX ADMIN — FAMOTIDINE SCH MG: 20 TABLET, FILM COATED ORAL at 08:23

## 2023-01-22 VITALS — DIASTOLIC BLOOD PRESSURE: 64 MMHG | SYSTOLIC BLOOD PRESSURE: 133 MMHG

## 2023-01-22 VITALS — SYSTOLIC BLOOD PRESSURE: 169 MMHG | DIASTOLIC BLOOD PRESSURE: 67 MMHG

## 2023-01-22 VITALS — DIASTOLIC BLOOD PRESSURE: 71 MMHG | SYSTOLIC BLOOD PRESSURE: 152 MMHG

## 2023-01-22 VITALS — DIASTOLIC BLOOD PRESSURE: 67 MMHG | SYSTOLIC BLOOD PRESSURE: 136 MMHG

## 2023-01-22 VITALS — DIASTOLIC BLOOD PRESSURE: 68 MMHG | SYSTOLIC BLOOD PRESSURE: 170 MMHG

## 2023-01-22 VITALS — DIASTOLIC BLOOD PRESSURE: 74 MMHG | SYSTOLIC BLOOD PRESSURE: 161 MMHG

## 2023-01-22 LAB
ANION GAP SERPL CALC-SCNC: 15.8 MMOL/L (ref 8–16)
BUN SERPL-MCNC: 27 MG/DL (ref 7–26)
BUN/CREAT SERPL: 27 (ref 6–25)
CALCIUM SERPL-MCNC: 9.7 MG/DL (ref 8.4–10.2)
CHLORIDE SERPL-SCNC: 105 MMOL/L (ref 98–107)
CO2 SERPL-SCNC: 20 MMOL/L (ref 22–29)
GLUCOSE SERPLBLD-MCNC: 232 MG/DL (ref 74–118)
POTASSIUM SERPL-SCNC: 3.8 MMOL/L (ref 3.5–5.1)
SODIUM SERPL-SCNC: 137 MMOL/L (ref 136–145)

## 2023-01-22 RX ADMIN — HYDRALAZINE HYDROCHLORIDE SCH MG: 25 TABLET ORAL at 20:43

## 2023-01-22 RX ADMIN — TAMSULOSIN HYDROCHLORIDE SCH MG: 0.4 CAPSULE ORAL at 08:50

## 2023-01-22 RX ADMIN — INSULIN HUMAN SCH UNIT: 100 INJECTION, SOLUTION PARENTERAL at 11:18

## 2023-01-22 RX ADMIN — MYCOPHENOLATE MOFETIL SCH MG: 250 CAPSULE ORAL at 20:42

## 2023-01-22 RX ADMIN — HYDRALAZINE HYDROCHLORIDE SCH MG: 25 TABLET ORAL at 08:52

## 2023-01-22 RX ADMIN — INSULIN HUMAN SCH UNIT: 100 INJECTION, SOLUTION PARENTERAL at 22:25

## 2023-01-22 RX ADMIN — LEVOTHYROXINE SODIUM SCH MCG: 100 TABLET ORAL at 05:20

## 2023-01-22 RX ADMIN — TACROLIMUS SCH MG: 1 CAPSULE, GELATIN COATED ORAL at 08:51

## 2023-01-22 RX ADMIN — FAMOTIDINE SCH MG: 20 TABLET, FILM COATED ORAL at 08:51

## 2023-01-22 RX ADMIN — CARVEDILOL SCH MG: 12.5 TABLET, FILM COATED ORAL at 16:47

## 2023-01-22 RX ADMIN — MYCOPHENOLATE MOFETIL SCH MG: 250 CAPSULE ORAL at 08:50

## 2023-01-22 RX ADMIN — ISOSORBIDE DINITRATE SCH MG: 20 TABLET ORAL at 08:51

## 2023-01-22 RX ADMIN — INSULIN HUMAN SCH UNIT: 100 INJECTION, SOLUTION PARENTERAL at 15:56

## 2023-01-22 RX ADMIN — TACROLIMUS SCH MG: 1 CAPSULE, GELATIN COATED ORAL at 20:42

## 2023-01-22 RX ADMIN — FAMOTIDINE SCH MG: 20 TABLET, FILM COATED ORAL at 16:48

## 2023-01-22 RX ADMIN — HYDRALAZINE HYDROCHLORIDE SCH MG: 25 TABLET ORAL at 14:13

## 2023-01-22 RX ADMIN — PREDNISONE SCH MG: 5 TABLET ORAL at 08:52

## 2023-01-22 RX ADMIN — TACROLIMUS SCH MG: 1 CAPSULE, GELATIN COATED ORAL at 20:43

## 2023-01-22 RX ADMIN — CARVEDILOL SCH MG: 12.5 TABLET, FILM COATED ORAL at 08:52

## 2023-01-22 RX ADMIN — Medication SCH MG: at 08:50

## 2023-01-22 RX ADMIN — INSULIN HUMAN SCH UNIT: 100 INJECTION, SOLUTION PARENTERAL at 08:04

## 2023-01-22 RX ADMIN — ISOSORBIDE DINITRATE SCH MG: 20 TABLET ORAL at 16:47

## 2023-01-23 VITALS — DIASTOLIC BLOOD PRESSURE: 66 MMHG | SYSTOLIC BLOOD PRESSURE: 170 MMHG

## 2023-01-23 VITALS — SYSTOLIC BLOOD PRESSURE: 192 MMHG | DIASTOLIC BLOOD PRESSURE: 75 MMHG

## 2023-01-23 VITALS — DIASTOLIC BLOOD PRESSURE: 65 MMHG | SYSTOLIC BLOOD PRESSURE: 174 MMHG

## 2023-01-23 VITALS — DIASTOLIC BLOOD PRESSURE: 67 MMHG | SYSTOLIC BLOOD PRESSURE: 150 MMHG

## 2023-01-23 VITALS — DIASTOLIC BLOOD PRESSURE: 70 MMHG | SYSTOLIC BLOOD PRESSURE: 153 MMHG

## 2023-01-23 VITALS — SYSTOLIC BLOOD PRESSURE: 178 MMHG | DIASTOLIC BLOOD PRESSURE: 71 MMHG

## 2023-01-23 VITALS — SYSTOLIC BLOOD PRESSURE: 174 MMHG | DIASTOLIC BLOOD PRESSURE: 65 MMHG

## 2023-01-23 RX ADMIN — TACROLIMUS SCH MG: 1 CAPSULE, GELATIN COATED ORAL at 21:26

## 2023-01-23 RX ADMIN — TAMSULOSIN HYDROCHLORIDE SCH MG: 0.4 CAPSULE ORAL at 08:17

## 2023-01-23 RX ADMIN — INSULIN HUMAN SCH UNIT: 100 INJECTION, SOLUTION PARENTERAL at 16:53

## 2023-01-23 RX ADMIN — SODIUM CHLORIDE SCH MLS/HR: 4.5 INJECTION, SOLUTION INTRAVENOUS at 06:13

## 2023-01-23 RX ADMIN — Medication SCH MG: at 08:16

## 2023-01-23 RX ADMIN — CARVEDILOL SCH MG: 12.5 TABLET, FILM COATED ORAL at 16:59

## 2023-01-23 RX ADMIN — ISOSORBIDE DINITRATE SCH MG: 20 TABLET ORAL at 08:18

## 2023-01-23 RX ADMIN — HYDRALAZINE HYDROCHLORIDE SCH MG: 25 TABLET ORAL at 21:27

## 2023-01-23 RX ADMIN — CARVEDILOL SCH MG: 12.5 TABLET, FILM COATED ORAL at 08:19

## 2023-01-23 RX ADMIN — TACROLIMUS SCH MG: 1 CAPSULE, GELATIN COATED ORAL at 08:18

## 2023-01-23 RX ADMIN — FAMOTIDINE SCH MG: 20 TABLET, FILM COATED ORAL at 08:19

## 2023-01-23 RX ADMIN — SODIUM CHLORIDE SCH MLS/HR: 4.5 INJECTION, SOLUTION INTRAVENOUS at 05:07

## 2023-01-23 RX ADMIN — PREDNISONE SCH MG: 5 TABLET ORAL at 08:17

## 2023-01-23 RX ADMIN — ISOSORBIDE DINITRATE SCH MG: 20 TABLET ORAL at 16:58

## 2023-01-23 RX ADMIN — MYCOPHENOLATE MOFETIL SCH MG: 250 CAPSULE ORAL at 08:16

## 2023-01-23 RX ADMIN — FAMOTIDINE SCH MG: 20 TABLET, FILM COATED ORAL at 16:58

## 2023-01-23 RX ADMIN — INSULIN HUMAN SCH UNIT: 100 INJECTION, SOLUTION PARENTERAL at 11:41

## 2023-01-23 RX ADMIN — MYCOPHENOLATE MOFETIL SCH MG: 250 CAPSULE ORAL at 21:26

## 2023-01-23 RX ADMIN — INSULIN HUMAN SCH UNIT: 100 INJECTION, SOLUTION PARENTERAL at 21:32

## 2023-01-23 RX ADMIN — HYDRALAZINE HYDROCHLORIDE SCH MG: 25 TABLET ORAL at 05:08

## 2023-01-23 RX ADMIN — CARVEDILOL SCH MG: 12.5 TABLET, FILM COATED ORAL at 11:44

## 2023-01-23 RX ADMIN — INSULIN HUMAN SCH UNIT: 100 INJECTION, SOLUTION PARENTERAL at 08:09

## 2023-01-23 RX ADMIN — LEVOTHYROXINE SODIUM SCH MCG: 100 TABLET ORAL at 05:07

## 2023-01-23 RX ADMIN — HYDRALAZINE HYDROCHLORIDE SCH MG: 25 TABLET ORAL at 08:17

## 2023-01-24 VITALS — SYSTOLIC BLOOD PRESSURE: 171 MMHG | DIASTOLIC BLOOD PRESSURE: 66 MMHG

## 2023-01-24 VITALS — SYSTOLIC BLOOD PRESSURE: 171 MMHG | DIASTOLIC BLOOD PRESSURE: 68 MMHG

## 2023-01-24 VITALS — SYSTOLIC BLOOD PRESSURE: 178 MMHG | DIASTOLIC BLOOD PRESSURE: 70 MMHG

## 2023-01-24 VITALS — DIASTOLIC BLOOD PRESSURE: 68 MMHG | SYSTOLIC BLOOD PRESSURE: 171 MMHG

## 2023-01-24 LAB
ANION GAP SERPL CALC-SCNC: 13.9 MMOL/L (ref 8–16)
BASOPHILS # BLD AUTO: 0 10*3/UL (ref 0–0.1)
BASOPHILS NFR BLD AUTO: 0.2 % (ref 0–1)
BUN SERPL-MCNC: 22 MG/DL (ref 7–26)
BUN/CREAT SERPL: 25 (ref 6–25)
CALCIUM SERPL-MCNC: 9.6 MG/DL (ref 8.4–10.2)
CHLORIDE SERPL-SCNC: 105 MMOL/L (ref 98–107)
CO2 SERPL-SCNC: 23 MMOL/L (ref 22–29)
DEPRECATED NEUTROPHILS # BLD AUTO: 3.4 10*3/UL (ref 2.1–6.9)
EOSINOPHIL # BLD AUTO: 0 10*3/UL (ref 0–0.4)
EOSINOPHIL NFR BLD AUTO: 0.7 % (ref 0–6)
ERYTHROCYTE [DISTWIDTH] IN CORD BLOOD: 13.7 % (ref 11.7–14.4)
GLUCOSE SERPLBLD-MCNC: 133 MG/DL (ref 74–118)
HCT VFR BLD AUTO: 39.6 % (ref 38.2–49.6)
HGB BLD-MCNC: 12.8 G/DL (ref 14–18)
LYMPHOCYTES # BLD: 1.5 10*3/UL (ref 1–3.2)
LYMPHOCYTES NFR BLD AUTO: 27.6 % (ref 18–39.1)
MCH RBC QN AUTO: 28.1 PG (ref 28–32)
MCHC RBC AUTO-ENTMCNC: 32.3 G/DL (ref 31–35)
MCV RBC AUTO: 87 FL (ref 81–99)
MONOCYTES # BLD AUTO: 0.5 10*3/UL (ref 0.2–0.8)
MONOCYTES NFR BLD AUTO: 9.2 % (ref 4.4–11.3)
NEUTS SEG NFR BLD AUTO: 60.7 % (ref 38.7–80)
PLATELET # BLD AUTO: 149 X10E3/UL (ref 140–360)
POTASSIUM SERPL-SCNC: 3.9 MMOL/L (ref 3.5–5.1)
RBC # BLD AUTO: 4.55 X10E6/UL (ref 4.3–5.7)
SODIUM SERPL-SCNC: 138 MMOL/L (ref 136–145)

## 2023-01-24 RX ADMIN — TACROLIMUS SCH MG: 1 CAPSULE, GELATIN COATED ORAL at 08:05

## 2023-01-24 RX ADMIN — SODIUM CHLORIDE SCH MLS/HR: 4.5 INJECTION, SOLUTION INTRAVENOUS at 05:15

## 2023-01-24 RX ADMIN — TAMSULOSIN HYDROCHLORIDE SCH MG: 0.4 CAPSULE ORAL at 08:06

## 2023-01-24 RX ADMIN — CARVEDILOL SCH MG: 12.5 TABLET, FILM COATED ORAL at 08:05

## 2023-01-24 RX ADMIN — ISOSORBIDE DINITRATE SCH MG: 20 TABLET ORAL at 08:06

## 2023-01-24 RX ADMIN — MYCOPHENOLATE MOFETIL SCH MG: 250 CAPSULE ORAL at 08:04

## 2023-01-24 RX ADMIN — PREDNISONE SCH MG: 5 TABLET ORAL at 08:05

## 2023-01-24 RX ADMIN — FAMOTIDINE SCH MG: 20 TABLET, FILM COATED ORAL at 08:07

## 2023-01-24 RX ADMIN — HYDRALAZINE HYDROCHLORIDE SCH MG: 25 TABLET ORAL at 08:07

## 2023-01-24 RX ADMIN — LEVOTHYROXINE SODIUM SCH MCG: 100 TABLET ORAL at 05:15

## 2023-01-24 RX ADMIN — Medication SCH MG: at 08:06

## 2023-01-24 RX ADMIN — INSULIN HUMAN SCH UNIT: 100 INJECTION, SOLUTION PARENTERAL at 07:30

## 2023-05-22 ENCOUNTER — HOSPITAL ENCOUNTER (INPATIENT)
Dept: HOSPITAL 88 - ER | Age: 68
LOS: 4 days | Discharge: HOME | DRG: 193 | End: 2023-05-26
Attending: INTERNAL MEDICINE | Admitting: INTERNAL MEDICINE
Payer: MEDICARE

## 2023-05-22 VITALS
RESPIRATION RATE: 29 BRPM | DIASTOLIC BLOOD PRESSURE: 70 MMHG | OXYGEN SATURATION: 95 % | SYSTOLIC BLOOD PRESSURE: 173 MMHG | HEART RATE: 83 BPM

## 2023-05-22 VITALS — OXYGEN SATURATION: 99 % | RESPIRATION RATE: 18 BRPM | HEART RATE: 74 BPM

## 2023-05-22 VITALS — WEIGHT: 225 LBS | HEIGHT: 69 IN | BODY MASS INDEX: 33.33 KG/M2

## 2023-05-22 DIAGNOSIS — Z87.891: ICD-10-CM

## 2023-05-22 DIAGNOSIS — E03.9: ICD-10-CM

## 2023-05-22 DIAGNOSIS — J96.01: ICD-10-CM

## 2023-05-22 DIAGNOSIS — I13.2: ICD-10-CM

## 2023-05-22 DIAGNOSIS — Z82.49: ICD-10-CM

## 2023-05-22 DIAGNOSIS — R53.81: ICD-10-CM

## 2023-05-22 DIAGNOSIS — Z79.69: ICD-10-CM

## 2023-05-22 DIAGNOSIS — E11.22: ICD-10-CM

## 2023-05-22 DIAGNOSIS — Z83.3: ICD-10-CM

## 2023-05-22 DIAGNOSIS — N18.6: ICD-10-CM

## 2023-05-22 DIAGNOSIS — I50.33: ICD-10-CM

## 2023-05-22 DIAGNOSIS — E66.9: ICD-10-CM

## 2023-05-22 DIAGNOSIS — Z95.5: ICD-10-CM

## 2023-05-22 DIAGNOSIS — E11.40: ICD-10-CM

## 2023-05-22 DIAGNOSIS — N40.0: ICD-10-CM

## 2023-05-22 DIAGNOSIS — Z79.4: ICD-10-CM

## 2023-05-22 DIAGNOSIS — D84.821: ICD-10-CM

## 2023-05-22 DIAGNOSIS — I25.10: ICD-10-CM

## 2023-05-22 DIAGNOSIS — Z87.01: ICD-10-CM

## 2023-05-22 DIAGNOSIS — Z79.899: ICD-10-CM

## 2023-05-22 DIAGNOSIS — Z94.0: ICD-10-CM

## 2023-05-22 DIAGNOSIS — E11.51: ICD-10-CM

## 2023-05-22 DIAGNOSIS — E78.5: ICD-10-CM

## 2023-05-22 DIAGNOSIS — Z87.440: ICD-10-CM

## 2023-05-22 DIAGNOSIS — Z89.422: ICD-10-CM

## 2023-05-22 DIAGNOSIS — J18.9: Primary | ICD-10-CM

## 2023-05-22 LAB
ALBUMIN SERPL-MCNC: 3.2 G/DL (ref 3.5–5)
ALBUMIN/GLOB SERPL: 0.8 {RATIO} (ref 0.8–2)
ALP SERPL-CCNC: 57 IU/L (ref 40–150)
ALT SERPL-CCNC: 25 IU/L (ref 0–55)
ANION GAP SERPL CALC-SCNC: 14.7 MMOL/L (ref 8–16)
BACTERIA URNS QL MICRO: (no result) /HPF
BASOPHILS # BLD AUTO: 0 10*3/UL (ref 0–0.1)
BASOPHILS NFR BLD AUTO: 0.2 % (ref 0–1)
BUN SERPL-MCNC: 34 MG/DL (ref 7–26)
BUN/CREAT SERPL: 30 (ref 6–25)
CALCIUM SERPL-MCNC: 9.8 MG/DL (ref 8.4–10.2)
CHLORIDE SERPL-SCNC: 99 MMOL/L (ref 98–107)
CK SERPL-CCNC: 34 IU/L (ref 30–200)
CLARITY UR: (no result)
CO2 BLDCOA CALC-SCNC: 29 MMOL/L
CO2 SERPL-SCNC: 25 MMOL/L (ref 22–29)
COLOR UR: YELLOW
DEPRECATED NEUTROPHILS # BLD AUTO: 5.3 10*3/UL (ref 2.1–6.9)
EOSINOPHIL # BLD AUTO: 0 10*3/UL (ref 0–0.4)
EOSINOPHIL NFR BLD AUTO: 0 % (ref 0–6)
EPI CELLS URNS QL MICRO: (no result) /LPF
ERYTHROCYTE [DISTWIDTH] IN CORD BLOOD: 14.2 % (ref 11.7–14.4)
GLOBULIN PLAS-MCNC: 4 G/DL (ref 2.3–3.5)
GLUCOSE SERPLBLD-MCNC: 410 MG/DL (ref 74–118)
HCO3 BLDA-SCNC: 28 MMOL/L (ref 22–26)
HCT VFR BLD AUTO: 36.5 % (ref 38.2–49.6)
HGB BLD-MCNC: 11.4 G/DL (ref 14–18)
KETONES UR QL STRIP.AUTO: NEGATIVE
LEUKOCYTE ESTERASE UR QL STRIP.AUTO: (no result)
LYMPHOCYTES # BLD: 0.4 10*3/UL (ref 1–3.2)
LYMPHOCYTES NFR BLD AUTO: 5.5 % (ref 18–39.1)
MCH RBC QN AUTO: 28.8 PG (ref 28–32)
MCHC RBC AUTO-ENTMCNC: 31.2 G/DL (ref 31–35)
MCV RBC AUTO: 92.2 FL (ref 81–99)
MONOCYTES # BLD AUTO: 0.5 10*3/UL (ref 0.2–0.8)
MONOCYTES NFR BLD AUTO: 8.5 % (ref 4.4–11.3)
NEUTS SEG NFR BLD AUTO: 83.4 % (ref 38.7–80)
NITRITE UR QL STRIP.AUTO: NEGATIVE
PCO2 BLDA: 43 MMHG (ref 35–45)
PCO2 BLDA: 68 MMHG (ref 80–105)
PH BLDA: 7.41 [PH] (ref 7.35–7.45)
PLATELET # BLD AUTO: 125 X10E3/UL (ref 140–360)
POTASSIUM SERPL-SCNC: 4.7 MMOL/L (ref 3.5–5.1)
PROT UR QL STRIP.AUTO: (no result)
RBC # BLD AUTO: 3.96 X10E6/UL (ref 4.3–5.7)
SAO2 % BLDA: 93 % (ref 95–98)
SODIUM SERPL-SCNC: 134 MMOL/L (ref 136–145)
SP GR UR STRIP: 1.01 (ref 1.01–1.02)
UROBILINOGEN UR STRIP-MCNC: 2 MG/DL (ref 0.2–1)
WBC #/AREA URNS HPF: (no result) /HPF (ref 0–5)

## 2023-05-22 PROCEDURE — 87206 SMEAR FLUORESCENT/ACID STAI: CPT

## 2023-05-22 PROCEDURE — 87299 ANTIBODY DETECTION NOS IF: CPT

## 2023-05-22 PROCEDURE — 87449 NOS EACH ORGANISM AG IA: CPT

## 2023-05-22 PROCEDURE — 71045 X-RAY EXAM CHEST 1 VIEW: CPT

## 2023-05-22 PROCEDURE — 87109 MYCOPLASMA: CPT

## 2023-05-22 PROCEDURE — 87385 HISTOPLASMA CAPSUL AG IA: CPT

## 2023-05-22 PROCEDURE — 36415 COLL VENOUS BLD VENIPUNCTURE: CPT

## 2023-05-22 PROCEDURE — 99285 EMERGENCY DEPT VISIT HI MDM: CPT

## 2023-05-22 PROCEDURE — 87116 MYCOBACTERIA CULTURE: CPT

## 2023-05-22 PROCEDURE — 87278 LEGION PNEUMOPHILIA AG IF: CPT

## 2023-05-22 PROCEDURE — 83605 ASSAY OF LACTIC ACID: CPT

## 2023-05-22 PROCEDURE — 83880 ASSAY OF NATRIURETIC PEPTIDE: CPT

## 2023-05-22 PROCEDURE — 93306 TTE W/DOPPLER COMPLETE: CPT

## 2023-05-22 PROCEDURE — 82553 CREATINE MB FRACTION: CPT

## 2023-05-22 PROCEDURE — 87335 E COLI 0157 AG IA: CPT

## 2023-05-22 PROCEDURE — 88305 TISSUE EXAM BY PATHOLOGIST: CPT

## 2023-05-22 PROCEDURE — 81001 URINALYSIS AUTO W/SCOPE: CPT

## 2023-05-22 PROCEDURE — 88312 SPECIAL STAINS GROUP 1: CPT

## 2023-05-22 PROCEDURE — 82948 REAGENT STRIP/BLOOD GLUCOSE: CPT

## 2023-05-22 PROCEDURE — 87102 FUNGUS ISOLATION CULTURE: CPT

## 2023-05-22 PROCEDURE — 36600 WITHDRAWAL OF ARTERIAL BLOOD: CPT

## 2023-05-22 PROCEDURE — 93005 ELECTROCARDIOGRAM TRACING: CPT

## 2023-05-22 PROCEDURE — 85025 COMPLETE CBC W/AUTO DIFF WBC: CPT

## 2023-05-22 PROCEDURE — 31622 DX BRONCHOSCOPE/WASH: CPT

## 2023-05-22 PROCEDURE — 87040 BLOOD CULTURE FOR BACTERIA: CPT

## 2023-05-22 PROCEDURE — 71260 CT THORAX DX C+: CPT

## 2023-05-22 PROCEDURE — 88300 SURGICAL PATH GROSS: CPT

## 2023-05-22 PROCEDURE — 94799 UNLISTED PULMONARY SVC/PX: CPT

## 2023-05-22 PROCEDURE — 80061 LIPID PANEL: CPT

## 2023-05-22 PROCEDURE — 88112 CYTOPATH CELL ENHANCE TECH: CPT

## 2023-05-22 PROCEDURE — 86635 COCCIDIOIDES ANTIBODY: CPT

## 2023-05-22 PROCEDURE — 83036 HEMOGLOBIN GLYCOSYLATED A1C: CPT

## 2023-05-22 PROCEDURE — 82550 ASSAY OF CK (CPK): CPT

## 2023-05-22 PROCEDURE — 87070 CULTURE OTHR SPECIMN AEROBIC: CPT

## 2023-05-22 PROCEDURE — 84484 ASSAY OF TROPONIN QUANT: CPT

## 2023-05-22 PROCEDURE — 82805 BLOOD GASES W/O2 SATURATION: CPT

## 2023-05-22 PROCEDURE — 87205 SMEAR GRAM STAIN: CPT

## 2023-05-22 PROCEDURE — 80053 COMPREHEN METABOLIC PANEL: CPT

## 2023-05-22 RX ADMIN — ATORVASTATIN CALCIUM SCH MG: 20 TABLET, FILM COATED ORAL at 20:55

## 2023-05-22 RX ADMIN — INSULIN GLARGINE SCH UNITS: 100 INJECTION, SOLUTION SUBCUTANEOUS at 20:50

## 2023-05-22 RX ADMIN — FUROSEMIDE SCH MG: 10 INJECTION, SOLUTION INTRAMUSCULAR; INTRAVENOUS at 19:18

## 2023-05-22 RX ADMIN — INSULIN LISPRO SCH UNIT: 100 INJECTION, SOLUTION INTRAVENOUS; SUBCUTANEOUS at 20:48

## 2023-05-23 VITALS
HEART RATE: 80 BPM | RESPIRATION RATE: 19 BRPM | SYSTOLIC BLOOD PRESSURE: 172 MMHG | DIASTOLIC BLOOD PRESSURE: 64 MMHG | OXYGEN SATURATION: 95 %

## 2023-05-23 VITALS
DIASTOLIC BLOOD PRESSURE: 66 MMHG | RESPIRATION RATE: 18 BRPM | HEART RATE: 66 BPM | SYSTOLIC BLOOD PRESSURE: 161 MMHG | OXYGEN SATURATION: 97 % | TEMPERATURE: 99.3 F

## 2023-05-23 VITALS
SYSTOLIC BLOOD PRESSURE: 157 MMHG | DIASTOLIC BLOOD PRESSURE: 77 MMHG | OXYGEN SATURATION: 100 % | HEART RATE: 59 BPM | RESPIRATION RATE: 20 BRPM | TEMPERATURE: 97.7 F

## 2023-05-23 VITALS
OXYGEN SATURATION: 96 % | RESPIRATION RATE: 27 BRPM | SYSTOLIC BLOOD PRESSURE: 161 MMHG | HEART RATE: 77 BPM | DIASTOLIC BLOOD PRESSURE: 62 MMHG

## 2023-05-23 VITALS
RESPIRATION RATE: 19 BRPM | DIASTOLIC BLOOD PRESSURE: 52 MMHG | TEMPERATURE: 97.7 F | HEART RATE: 71 BPM | SYSTOLIC BLOOD PRESSURE: 131 MMHG | OXYGEN SATURATION: 93 %

## 2023-05-23 VITALS
DIASTOLIC BLOOD PRESSURE: 60 MMHG | HEART RATE: 69 BPM | OXYGEN SATURATION: 93 % | SYSTOLIC BLOOD PRESSURE: 159 MMHG | RESPIRATION RATE: 17 BRPM

## 2023-05-23 VITALS — OXYGEN SATURATION: 100 % | HEART RATE: 59 BPM | RESPIRATION RATE: 20 BRPM

## 2023-05-23 VITALS
SYSTOLIC BLOOD PRESSURE: 157 MMHG | HEART RATE: 59 BPM | OXYGEN SATURATION: 100 % | RESPIRATION RATE: 20 BRPM | DIASTOLIC BLOOD PRESSURE: 77 MMHG | TEMPERATURE: 97.7 F

## 2023-05-23 VITALS
RESPIRATION RATE: 25 BRPM | HEART RATE: 83 BPM | OXYGEN SATURATION: 96 % | TEMPERATURE: 100 F | DIASTOLIC BLOOD PRESSURE: 105 MMHG | SYSTOLIC BLOOD PRESSURE: 170 MMHG

## 2023-05-23 VITALS
RESPIRATION RATE: 18 BRPM | TEMPERATURE: 97.9 F | HEART RATE: 70 BPM | SYSTOLIC BLOOD PRESSURE: 174 MMHG | DIASTOLIC BLOOD PRESSURE: 70 MMHG | OXYGEN SATURATION: 98 %

## 2023-05-23 VITALS
HEART RATE: 69 BPM | SYSTOLIC BLOOD PRESSURE: 148 MMHG | OXYGEN SATURATION: 95 % | RESPIRATION RATE: 19 BRPM | DIASTOLIC BLOOD PRESSURE: 73 MMHG

## 2023-05-23 VITALS — RESPIRATION RATE: 24 BRPM | HEART RATE: 66 BPM | OXYGEN SATURATION: 97 %

## 2023-05-23 VITALS
OXYGEN SATURATION: 96 % | SYSTOLIC BLOOD PRESSURE: 123 MMHG | DIASTOLIC BLOOD PRESSURE: 74 MMHG | RESPIRATION RATE: 26 BRPM | HEART RATE: 74 BPM

## 2023-05-23 VITALS
HEART RATE: 67 BPM | SYSTOLIC BLOOD PRESSURE: 166 MMHG | OXYGEN SATURATION: 95 % | TEMPERATURE: 99 F | RESPIRATION RATE: 19 BRPM | DIASTOLIC BLOOD PRESSURE: 64 MMHG

## 2023-05-23 VITALS
TEMPERATURE: 100 F | HEART RATE: 81 BPM | SYSTOLIC BLOOD PRESSURE: 170 MMHG | DIASTOLIC BLOOD PRESSURE: 105 MMHG | RESPIRATION RATE: 19 BRPM | OXYGEN SATURATION: 95 %

## 2023-05-23 VITALS
SYSTOLIC BLOOD PRESSURE: 164 MMHG | RESPIRATION RATE: 17 BRPM | OXYGEN SATURATION: 90 % | DIASTOLIC BLOOD PRESSURE: 58 MMHG | HEART RATE: 66 BPM

## 2023-05-23 VITALS — HEART RATE: 67 BPM | OXYGEN SATURATION: 97 % | RESPIRATION RATE: 20 BRPM

## 2023-05-23 VITALS
SYSTOLIC BLOOD PRESSURE: 146 MMHG | OXYGEN SATURATION: 96 % | RESPIRATION RATE: 22 BRPM | HEART RATE: 73 BPM | DIASTOLIC BLOOD PRESSURE: 60 MMHG

## 2023-05-23 VITALS
SYSTOLIC BLOOD PRESSURE: 174 MMHG | RESPIRATION RATE: 18 BRPM | OXYGEN SATURATION: 98 % | HEART RATE: 70 BPM | TEMPERATURE: 97.9 F | DIASTOLIC BLOOD PRESSURE: 68 MMHG

## 2023-05-23 LAB
ALBUMIN SERPL-MCNC: 3.1 G/DL (ref 3.5–5)
ALBUMIN/GLOB SERPL: 0.8 {RATIO} (ref 0.8–2)
ALP SERPL-CCNC: 57 IU/L (ref 40–150)
ALT SERPL-CCNC: 22 IU/L (ref 0–55)
ANION GAP SERPL CALC-SCNC: 14 MMOL/L (ref 8–16)
BASOPHILS # BLD AUTO: 0 10*3/UL (ref 0–0.1)
BASOPHILS NFR BLD AUTO: 0.3 % (ref 0–1)
BUN SERPL-MCNC: 31 MG/DL (ref 7–26)
BUN/CREAT SERPL: 33 (ref 6–25)
CALCIUM SERPL-MCNC: 9.7 MG/DL (ref 8.4–10.2)
CHLORIDE SERPL-SCNC: 102 MMOL/L (ref 98–107)
CHOLEST SERPL-MCNC: 85 MD/DL (ref 0–199)
CHOLEST/HDLC SERPL: 2.1 {RATIO} (ref 3.9–4.7)
CK SERPL-CCNC: 28 IU/L (ref 30–200)
CK SERPL-CCNC: 29 IU/L (ref 30–200)
CO2 SERPL-SCNC: 28 MMOL/L (ref 22–29)
DEPRECATED NEUTROPHILS # BLD AUTO: 5.4 10*3/UL (ref 2.1–6.9)
EOSINOPHIL # BLD AUTO: 0 10*3/UL (ref 0–0.4)
EOSINOPHIL NFR BLD AUTO: 0.1 % (ref 0–6)
ERYTHROCYTE [DISTWIDTH] IN CORD BLOOD: 14.4 % (ref 11.7–14.4)
GLOBULIN PLAS-MCNC: 4 G/DL (ref 2.3–3.5)
GLUCOSE SERPLBLD-MCNC: 114 MG/DL (ref 74–118)
HCT VFR BLD AUTO: 37 % (ref 38.2–49.6)
HDLC SERPL-MSCNC: 41 MG/DL (ref 40–60)
HGB BLD-MCNC: 11.6 G/DL (ref 14–18)
LDLC SERPL CALC-MCNC: 32 MG/DL (ref 60–130)
LYMPHOCYTES # BLD: 0.6 10*3/UL (ref 1–3.2)
LYMPHOCYTES NFR BLD AUTO: 9 % (ref 18–39.1)
MCH RBC QN AUTO: 28.6 PG (ref 28–32)
MCHC RBC AUTO-ENTMCNC: 31.4 G/DL (ref 31–35)
MCV RBC AUTO: 91.4 FL (ref 81–99)
MONOCYTES # BLD AUTO: 0.7 10*3/UL (ref 0.2–0.8)
MONOCYTES NFR BLD AUTO: 10.6 % (ref 4.4–11.3)
NEUTS SEG NFR BLD AUTO: 77.7 % (ref 38.7–80)
PLATELET # BLD AUTO: 145 X10E3/UL (ref 140–360)
POTASSIUM SERPL-SCNC: 4 MMOL/L (ref 3.5–5.1)
RBC # BLD AUTO: 4.05 X10E6/UL (ref 4.3–5.7)
SODIUM SERPL-SCNC: 140 MMOL/L (ref 136–145)
TRIGL SERPL-MCNC: 59 MG/DL (ref 0–149)

## 2023-05-23 RX ADMIN — BUMETANIDE SCH MG: 0.25 INJECTION INTRAMUSCULAR; INTRAVENOUS at 12:06

## 2023-05-23 RX ADMIN — MYCOPHENOLATE MOFETIL SCH MG: 250 CAPSULE ORAL at 17:39

## 2023-05-23 RX ADMIN — HYDRALAZINE HYDROCHLORIDE SCH MG: 25 TABLET ORAL at 17:27

## 2023-05-23 RX ADMIN — BUMETANIDE SCH MG: 0.25 INJECTION INTRAMUSCULAR; INTRAVENOUS at 21:55

## 2023-05-23 RX ADMIN — INSULIN LISPRO SCH UNIT: 100 INJECTION, SOLUTION INTRAVENOUS; SUBCUTANEOUS at 17:33

## 2023-05-23 RX ADMIN — TACROLIMUS SCH MG: 1 CAPSULE, GELATIN COATED ORAL at 09:00

## 2023-05-23 RX ADMIN — FUROSEMIDE SCH MG: 10 INJECTION, SOLUTION INTRAMUSCULAR; INTRAVENOUS at 05:57

## 2023-05-23 RX ADMIN — FAMOTIDINE SCH MG: 20 TABLET, FILM COATED ORAL at 17:27

## 2023-05-23 RX ADMIN — INSULIN GLARGINE SCH UNITS: 100 INJECTION, SOLUTION SUBCUTANEOUS at 22:07

## 2023-05-23 RX ADMIN — CARVEDILOL SCH MG: 12.5 TABLET, FILM COATED ORAL at 08:18

## 2023-05-23 RX ADMIN — ATORVASTATIN CALCIUM SCH MG: 20 TABLET, FILM COATED ORAL at 21:55

## 2023-05-23 RX ADMIN — MYCOPHENOLATE MOFETIL SCH MG: 250 CAPSULE ORAL at 08:17

## 2023-05-23 RX ADMIN — INSULIN LISPRO SCH UNIT: 100 INJECTION, SOLUTION INTRAVENOUS; SUBCUTANEOUS at 07:30

## 2023-05-23 RX ADMIN — Medication SCH MG: at 08:17

## 2023-05-23 RX ADMIN — TAMSULOSIN HYDROCHLORIDE SCH MG: 0.4 CAPSULE ORAL at 08:17

## 2023-05-23 RX ADMIN — HYDRALAZINE HYDROCHLORIDE SCH MG: 25 TABLET ORAL at 13:11

## 2023-05-23 RX ADMIN — MYCOPHENOLATE MOFETIL SCH MG: 250 CAPSULE ORAL at 10:44

## 2023-05-23 RX ADMIN — PREDNISONE SCH MG: 5 TABLET ORAL at 08:17

## 2023-05-23 RX ADMIN — CARVEDILOL SCH MG: 12.5 TABLET, FILM COATED ORAL at 17:28

## 2023-05-23 RX ADMIN — FAMOTIDINE SCH MG: 20 TABLET, FILM COATED ORAL at 08:17

## 2023-05-23 RX ADMIN — INSULIN LISPRO SCH UNIT: 100 INJECTION, SOLUTION INTRAVENOUS; SUBCUTANEOUS at 22:06

## 2023-05-23 RX ADMIN — INSULIN LISPRO SCH UNIT: 100 INJECTION, SOLUTION INTRAVENOUS; SUBCUTANEOUS at 12:19

## 2023-05-24 VITALS
DIASTOLIC BLOOD PRESSURE: 71 MMHG | TEMPERATURE: 98.4 F | HEART RATE: 64 BPM | OXYGEN SATURATION: 100 % | SYSTOLIC BLOOD PRESSURE: 183 MMHG | RESPIRATION RATE: 22 BRPM

## 2023-05-24 VITALS
DIASTOLIC BLOOD PRESSURE: 64 MMHG | SYSTOLIC BLOOD PRESSURE: 155 MMHG | HEART RATE: 61 BPM | TEMPERATURE: 97.3 F | OXYGEN SATURATION: 96 % | RESPIRATION RATE: 18 BRPM

## 2023-05-24 VITALS
RESPIRATION RATE: 20 BRPM | SYSTOLIC BLOOD PRESSURE: 166 MMHG | OXYGEN SATURATION: 100 % | DIASTOLIC BLOOD PRESSURE: 56 MMHG | TEMPERATURE: 98.2 F | HEART RATE: 64 BPM

## 2023-05-24 VITALS
DIASTOLIC BLOOD PRESSURE: 59 MMHG | RESPIRATION RATE: 20 BRPM | OXYGEN SATURATION: 98 % | HEART RATE: 67 BPM | TEMPERATURE: 98 F | SYSTOLIC BLOOD PRESSURE: 158 MMHG

## 2023-05-24 VITALS — HEART RATE: 62 BPM | OXYGEN SATURATION: 99 % | RESPIRATION RATE: 14 BRPM

## 2023-05-24 VITALS
TEMPERATURE: 98.1 F | HEART RATE: 64 BPM | OXYGEN SATURATION: 100 % | DIASTOLIC BLOOD PRESSURE: 85 MMHG | SYSTOLIC BLOOD PRESSURE: 178 MMHG | RESPIRATION RATE: 19 BRPM

## 2023-05-24 VITALS
SYSTOLIC BLOOD PRESSURE: 168 MMHG | DIASTOLIC BLOOD PRESSURE: 77 MMHG | HEART RATE: 67 BPM | OXYGEN SATURATION: 100 % | TEMPERATURE: 98.1 F | RESPIRATION RATE: 18 BRPM

## 2023-05-24 VITALS
RESPIRATION RATE: 18 BRPM | SYSTOLIC BLOOD PRESSURE: 154 MMHG | HEART RATE: 66 BPM | DIASTOLIC BLOOD PRESSURE: 66 MMHG | TEMPERATURE: 98.3 F | OXYGEN SATURATION: 94 %

## 2023-05-24 LAB
ALBUMIN SERPL-MCNC: 3.1 G/DL (ref 3.5–5)
ALBUMIN/GLOB SERPL: 0.7 {RATIO} (ref 0.8–2)
ALP SERPL-CCNC: 53 IU/L (ref 40–150)
ALT SERPL-CCNC: 21 IU/L (ref 0–55)
ANION GAP SERPL CALC-SCNC: 14.7 MMOL/L (ref 8–16)
BASOPHILS # BLD AUTO: 0 10*3/UL (ref 0–0.1)
BASOPHILS NFR BLD AUTO: 0.2 % (ref 0–1)
BUN SERPL-MCNC: 32 MG/DL (ref 7–26)
BUN/CREAT SERPL: 31 (ref 6–25)
CALCIUM SERPL-MCNC: 10 MG/DL (ref 8.4–10.2)
CHLORIDE SERPL-SCNC: 99 MMOL/L (ref 98–107)
CK SERPL-CCNC: 26 IU/L (ref 30–200)
CO2 SERPL-SCNC: 31 MMOL/L (ref 22–29)
DEPRECATED NEUTROPHILS # BLD AUTO: 4.9 10*3/UL (ref 2.1–6.9)
EOSINOPHIL # BLD AUTO: 0 10*3/UL (ref 0–0.4)
EOSINOPHIL NFR BLD AUTO: 0.6 % (ref 0–6)
ERYTHROCYTE [DISTWIDTH] IN CORD BLOOD: 14.5 % (ref 11.7–14.4)
GLOBULIN PLAS-MCNC: 4.3 G/DL (ref 2.3–3.5)
GLUCOSE SERPLBLD-MCNC: 95 MG/DL (ref 74–118)
HCT VFR BLD AUTO: 37.5 % (ref 38.2–49.6)
HGB BLD-MCNC: 11.8 G/DL (ref 14–18)
LYMPHOCYTES # BLD: 0.7 10*3/UL (ref 1–3.2)
LYMPHOCYTES NFR BLD AUTO: 11.1 % (ref 18–39.1)
MCH RBC QN AUTO: 28.8 PG (ref 28–32)
MCHC RBC AUTO-ENTMCNC: 31.5 G/DL (ref 31–35)
MCV RBC AUTO: 91.5 FL (ref 81–99)
MONOCYTES # BLD AUTO: 0.6 10*3/UL (ref 0.2–0.8)
MONOCYTES NFR BLD AUTO: 9.1 % (ref 4.4–11.3)
NEUTS SEG NFR BLD AUTO: 75.6 % (ref 38.7–80)
PLATELET # BLD AUTO: 148 X10E3/UL (ref 140–360)
POTASSIUM SERPL-SCNC: 3.7 MMOL/L (ref 3.5–5.1)
RBC # BLD AUTO: 4.1 X10E6/UL (ref 4.3–5.7)
SODIUM SERPL-SCNC: 141 MMOL/L (ref 136–145)

## 2023-05-24 PROCEDURE — 0B9J8ZX DRAINAGE OF LEFT LOWER LUNG LOBE, VIA NATURAL OR ARTIFICIAL OPENING ENDOSCOPIC, DIAGNOSTIC: ICD-10-PCS | Performed by: INTERNAL MEDICINE

## 2023-05-24 PROCEDURE — 0BDB8ZX EXTRACTION OF LEFT LOWER LOBE BRONCHUS, VIA NATURAL OR ARTIFICIAL OPENING ENDOSCOPIC, DIAGNOSTIC: ICD-10-PCS | Performed by: INTERNAL MEDICINE

## 2023-05-24 RX ADMIN — FAMOTIDINE SCH MG: 20 TABLET, FILM COATED ORAL at 09:00

## 2023-05-24 RX ADMIN — TACROLIMUS SCH MG: 1 CAPSULE, GELATIN COATED ORAL at 09:00

## 2023-05-24 RX ADMIN — HYDRALAZINE HYDROCHLORIDE SCH MG: 25 TABLET ORAL at 13:01

## 2023-05-24 RX ADMIN — PREDNISONE SCH MG: 5 TABLET ORAL at 09:00

## 2023-05-24 RX ADMIN — Medication SCH MG: at 09:00

## 2023-05-24 RX ADMIN — MYCOPHENOLATE MOFETIL SCH MG: 250 CAPSULE ORAL at 02:17

## 2023-05-24 RX ADMIN — HYDRALAZINE HYDROCHLORIDE SCH MG: 25 TABLET ORAL at 17:23

## 2023-05-24 RX ADMIN — HYDRALAZINE HYDROCHLORIDE SCH MG: 25 TABLET ORAL at 06:00

## 2023-05-24 RX ADMIN — INSULIN LISPRO SCH UNIT: 100 INJECTION, SOLUTION INTRAVENOUS; SUBCUTANEOUS at 17:47

## 2023-05-24 RX ADMIN — HYDRALAZINE HYDROCHLORIDE SCH MG: 25 TABLET ORAL at 00:30

## 2023-05-24 RX ADMIN — INSULIN GLARGINE SCH UNITS: 100 INJECTION, SOLUTION SUBCUTANEOUS at 22:01

## 2023-05-24 RX ADMIN — BICTEGRAVIR SODIUM, EMTRICITABINE, AND TENOFOVIR ALAFENAMIDE FUMARATE SCH: 50; 200; 25 TABLET ORAL at 17:25

## 2023-05-24 RX ADMIN — FAMOTIDINE SCH MG: 20 TABLET, FILM COATED ORAL at 17:22

## 2023-05-24 RX ADMIN — BUMETANIDE SCH MG: 1 TABLET ORAL at 17:22

## 2023-05-24 RX ADMIN — CARVEDILOL SCH MG: 12.5 TABLET, FILM COATED ORAL at 17:22

## 2023-05-24 RX ADMIN — BUMETANIDE SCH MG: 1 TABLET ORAL at 09:00

## 2023-05-24 RX ADMIN — INSULIN LISPRO SCH UNIT: 100 INJECTION, SOLUTION INTRAVENOUS; SUBCUTANEOUS at 07:30

## 2023-05-24 RX ADMIN — ATORVASTATIN CALCIUM SCH MG: 20 TABLET, FILM COATED ORAL at 21:55

## 2023-05-24 RX ADMIN — CARVEDILOL SCH MG: 12.5 TABLET, FILM COATED ORAL at 09:00

## 2023-05-24 RX ADMIN — INSULIN LISPRO SCH UNIT: 100 INJECTION, SOLUTION INTRAVENOUS; SUBCUTANEOUS at 11:30

## 2023-05-24 RX ADMIN — INSULIN LISPRO SCH UNIT: 100 INJECTION, SOLUTION INTRAVENOUS; SUBCUTANEOUS at 22:01

## 2023-05-24 RX ADMIN — TAMSULOSIN HYDROCHLORIDE SCH MG: 0.4 CAPSULE ORAL at 09:00

## 2023-05-25 VITALS
DIASTOLIC BLOOD PRESSURE: 73 MMHG | OXYGEN SATURATION: 95 % | TEMPERATURE: 98 F | RESPIRATION RATE: 18 BRPM | HEART RATE: 66 BPM | SYSTOLIC BLOOD PRESSURE: 153 MMHG

## 2023-05-25 VITALS
OXYGEN SATURATION: 98 % | HEART RATE: 65 BPM | TEMPERATURE: 97.7 F | DIASTOLIC BLOOD PRESSURE: 65 MMHG | SYSTOLIC BLOOD PRESSURE: 158 MMHG | RESPIRATION RATE: 18 BRPM

## 2023-05-25 VITALS
SYSTOLIC BLOOD PRESSURE: 166 MMHG | DIASTOLIC BLOOD PRESSURE: 72 MMHG | RESPIRATION RATE: 21 BRPM | TEMPERATURE: 98.1 F | HEART RATE: 69 BPM | OXYGEN SATURATION: 100 %

## 2023-05-25 VITALS
OXYGEN SATURATION: 99 % | RESPIRATION RATE: 22 BRPM | TEMPERATURE: 97.6 F | HEART RATE: 65 BPM | DIASTOLIC BLOOD PRESSURE: 84 MMHG | SYSTOLIC BLOOD PRESSURE: 167 MMHG

## 2023-05-25 VITALS — RESPIRATION RATE: 16 BRPM | OXYGEN SATURATION: 95 % | HEART RATE: 68 BPM

## 2023-05-25 VITALS
HEART RATE: 69 BPM | RESPIRATION RATE: 21 BRPM | DIASTOLIC BLOOD PRESSURE: 72 MMHG | TEMPERATURE: 98.1 F | OXYGEN SATURATION: 100 % | SYSTOLIC BLOOD PRESSURE: 166 MMHG

## 2023-05-25 VITALS
SYSTOLIC BLOOD PRESSURE: 152 MMHG | DIASTOLIC BLOOD PRESSURE: 62 MMHG | RESPIRATION RATE: 17 BRPM | OXYGEN SATURATION: 99 % | HEART RATE: 64 BPM | TEMPERATURE: 97.6 F

## 2023-05-25 VITALS — OXYGEN SATURATION: 85 % | HEART RATE: 62 BPM | RESPIRATION RATE: 14 BRPM

## 2023-05-25 VITALS
DIASTOLIC BLOOD PRESSURE: 65 MMHG | SYSTOLIC BLOOD PRESSURE: 158 MMHG | TEMPERATURE: 97.7 F | RESPIRATION RATE: 18 BRPM | OXYGEN SATURATION: 98 % | HEART RATE: 65 BPM

## 2023-05-25 LAB
ALBUMIN SERPL-MCNC: 3.2 G/DL (ref 3.5–5)
ALBUMIN/GLOB SERPL: 0.7 {RATIO} (ref 0.8–2)
ALP SERPL-CCNC: 54 IU/L (ref 40–150)
ALT SERPL-CCNC: 22 IU/L (ref 0–55)
ANION GAP SERPL CALC-SCNC: 12.8 MMOL/L (ref 8–16)
BUN SERPL-MCNC: 28 MG/DL (ref 7–26)
BUN/CREAT SERPL: 26 (ref 6–25)
CALCIUM SERPL-MCNC: 10 MG/DL (ref 8.4–10.2)
CHLORIDE SERPL-SCNC: 98 MMOL/L (ref 98–107)
CO2 SERPL-SCNC: 30 MMOL/L (ref 22–29)
GLOBULIN PLAS-MCNC: 4.4 G/DL (ref 2.3–3.5)
GLUCOSE SERPLBLD-MCNC: 227 MG/DL (ref 74–118)
POTASSIUM SERPL-SCNC: 3.8 MMOL/L (ref 3.5–5.1)
SODIUM SERPL-SCNC: 137 MMOL/L (ref 136–145)

## 2023-05-25 RX ADMIN — INSULIN LISPRO SCH UNIT: 100 INJECTION, SOLUTION INTRAVENOUS; SUBCUTANEOUS at 07:30

## 2023-05-25 RX ADMIN — HYDRALAZINE HYDROCHLORIDE SCH MG: 25 TABLET ORAL at 06:15

## 2023-05-25 RX ADMIN — PREDNISONE SCH MG: 5 TABLET ORAL at 09:08

## 2023-05-25 RX ADMIN — HYDRALAZINE HYDROCHLORIDE SCH MG: 25 TABLET ORAL at 01:27

## 2023-05-25 RX ADMIN — INSULIN LISPRO SCH UNIT: 100 INJECTION, SOLUTION INTRAVENOUS; SUBCUTANEOUS at 21:33

## 2023-05-25 RX ADMIN — BUMETANIDE SCH MG: 1 TABLET ORAL at 11:00

## 2023-05-25 RX ADMIN — ATORVASTATIN CALCIUM SCH MG: 20 TABLET, FILM COATED ORAL at 21:25

## 2023-05-25 RX ADMIN — HYDRALAZINE HYDROCHLORIDE SCH MG: 25 TABLET ORAL at 14:33

## 2023-05-25 RX ADMIN — CARVEDILOL SCH MG: 12.5 TABLET, FILM COATED ORAL at 17:08

## 2023-05-25 RX ADMIN — FAMOTIDINE SCH MG: 20 TABLET, FILM COATED ORAL at 17:07

## 2023-05-25 RX ADMIN — Medication SCH MG: at 09:08

## 2023-05-25 RX ADMIN — INSULIN LISPRO SCH UNIT: 100 INJECTION, SOLUTION INTRAVENOUS; SUBCUTANEOUS at 17:13

## 2023-05-25 RX ADMIN — INSULIN LISPRO SCH UNIT: 100 INJECTION, SOLUTION INTRAVENOUS; SUBCUTANEOUS at 12:26

## 2023-05-25 RX ADMIN — CARVEDILOL SCH MG: 12.5 TABLET, FILM COATED ORAL at 09:08

## 2023-05-25 RX ADMIN — INSULIN GLARGINE SCH UNITS: 100 INJECTION, SOLUTION SUBCUTANEOUS at 21:32

## 2023-05-25 RX ADMIN — TAMSULOSIN HYDROCHLORIDE SCH MG: 0.4 CAPSULE ORAL at 09:08

## 2023-05-25 RX ADMIN — BICTEGRAVIR SODIUM, EMTRICITABINE, AND TENOFOVIR ALAFENAMIDE FUMARATE SCH: 50; 200; 25 TABLET ORAL at 06:00

## 2023-05-25 RX ADMIN — FAMOTIDINE SCH MG: 20 TABLET, FILM COATED ORAL at 09:08

## 2023-05-25 RX ADMIN — BUMETANIDE SCH MG: 1 TABLET ORAL at 09:08

## 2023-05-25 RX ADMIN — HYDRALAZINE HYDROCHLORIDE SCH MG: 25 TABLET ORAL at 21:30

## 2023-05-25 RX ADMIN — TACROLIMUS SCH MG: 1 CAPSULE, GELATIN COATED ORAL at 09:09

## 2023-05-26 VITALS
OXYGEN SATURATION: 100 % | RESPIRATION RATE: 18 BRPM | TEMPERATURE: 97.7 F | HEART RATE: 65 BPM | SYSTOLIC BLOOD PRESSURE: 151 MMHG | DIASTOLIC BLOOD PRESSURE: 63 MMHG

## 2023-05-26 VITALS
TEMPERATURE: 97.7 F | OXYGEN SATURATION: 100 % | RESPIRATION RATE: 18 BRPM | DIASTOLIC BLOOD PRESSURE: 63 MMHG | HEART RATE: 65 BPM | SYSTOLIC BLOOD PRESSURE: 151 MMHG

## 2023-05-26 VITALS
DIASTOLIC BLOOD PRESSURE: 72 MMHG | SYSTOLIC BLOOD PRESSURE: 150 MMHG | TEMPERATURE: 98.1 F | HEART RATE: 69 BPM | OXYGEN SATURATION: 100 % | RESPIRATION RATE: 19 BRPM

## 2023-05-26 RX ADMIN — CARVEDILOL SCH MG: 12.5 TABLET, FILM COATED ORAL at 08:44

## 2023-05-26 RX ADMIN — HYDRALAZINE HYDROCHLORIDE SCH MG: 25 TABLET ORAL at 05:59

## 2023-05-26 RX ADMIN — TACROLIMUS SCH MG: 1 CAPSULE, GELATIN COATED ORAL at 08:47

## 2023-05-26 RX ADMIN — FAMOTIDINE SCH MG: 20 TABLET, FILM COATED ORAL at 08:42

## 2023-05-26 RX ADMIN — TAMSULOSIN HYDROCHLORIDE SCH MG: 0.4 CAPSULE ORAL at 08:43

## 2023-05-26 RX ADMIN — BUMETANIDE SCH MG: 1 TABLET ORAL at 08:42

## 2023-05-26 RX ADMIN — INSULIN LISPRO SCH UNIT: 100 INJECTION, SOLUTION INTRAVENOUS; SUBCUTANEOUS at 08:55

## 2023-05-26 RX ADMIN — Medication SCH MG: at 08:42

## 2023-05-26 RX ADMIN — PREDNISONE SCH MG: 5 TABLET ORAL at 08:42

## 2023-06-30 ENCOUNTER — HOSPITAL ENCOUNTER (OUTPATIENT)
Dept: HOSPITAL 88 - CT | Age: 68
End: 2023-06-30
Attending: INTERNAL MEDICINE
Payer: MEDICARE

## 2023-06-30 DIAGNOSIS — Z09: Primary | ICD-10-CM

## 2023-06-30 PROCEDURE — 71250 CT THORAX DX C-: CPT
